# Patient Record
Sex: FEMALE | Race: WHITE | NOT HISPANIC OR LATINO | Employment: OTHER | URBAN - METROPOLITAN AREA
[De-identification: names, ages, dates, MRNs, and addresses within clinical notes are randomized per-mention and may not be internally consistent; named-entity substitution may affect disease eponyms.]

---

## 2017-01-10 ENCOUNTER — HISTORICAL (OUTPATIENT)
Dept: ADMINISTRATIVE | Facility: HOSPITAL | Age: 57
End: 2017-01-10

## 2017-01-19 ENCOUNTER — HISTORICAL (OUTPATIENT)
Dept: RADIOLOGY | Facility: HOSPITAL | Age: 57
End: 2017-01-19

## 2017-03-31 ENCOUNTER — HISTORICAL (OUTPATIENT)
Dept: ADMINISTRATIVE | Facility: HOSPITAL | Age: 57
End: 2017-03-31

## 2017-06-01 LAB — CRC RECOMMENDATION EXT: NORMAL

## 2017-11-10 ENCOUNTER — HISTORICAL (OUTPATIENT)
Dept: ADMINISTRATIVE | Facility: HOSPITAL | Age: 57
End: 2017-11-10

## 2017-11-10 LAB
BUN SERPL-MCNC: 14 MG/DL (ref 7–18)
CALCIUM SERPL-MCNC: 9.2 MG/DL (ref 8.5–10.1)
CHLORIDE SERPL-SCNC: 104 MMOL/L (ref 98–107)
CO2 SERPL-SCNC: 28 MMOL/L (ref 21–32)
CREAT SERPL-MCNC: 0.66 MG/DL (ref 0.55–1.02)
GLUCOSE SERPL-MCNC: 84 MG/DL (ref 74–106)
POTASSIUM SERPL-SCNC: 4.1 MMOL/L (ref 3.5–5.1)
SODIUM SERPL-SCNC: 139 MMOL/L (ref 136–145)

## 2018-01-30 ENCOUNTER — HISTORICAL (OUTPATIENT)
Dept: PREADMISSION TESTING | Facility: HOSPITAL | Age: 58
End: 2018-01-30

## 2018-02-09 ENCOUNTER — HISTORICAL (OUTPATIENT)
Dept: SURGERY | Facility: HOSPITAL | Age: 58
End: 2018-02-09

## 2018-03-01 ENCOUNTER — HISTORICAL (OUTPATIENT)
Dept: LAB | Facility: HOSPITAL | Age: 58
End: 2018-03-01

## 2018-03-03 LAB — FINAL CULTURE: NORMAL

## 2018-03-13 ENCOUNTER — HISTORICAL (OUTPATIENT)
Dept: RADIOLOGY | Facility: HOSPITAL | Age: 58
End: 2018-03-13

## 2018-03-27 ENCOUNTER — HISTORICAL (OUTPATIENT)
Dept: RESPIRATORY THERAPY | Facility: HOSPITAL | Age: 58
End: 2018-03-27

## 2018-04-24 ENCOUNTER — HISTORICAL (OUTPATIENT)
Dept: INTENSIVE CARE | Facility: HOSPITAL | Age: 58
End: 2018-04-24

## 2018-04-26 ENCOUNTER — HISTORICAL (OUTPATIENT)
Dept: ADMINISTRATIVE | Facility: HOSPITAL | Age: 58
End: 2018-04-26

## 2018-04-26 LAB
ABS NEUT (OLG): 4.32 X10(3)/MCL (ref 2.1–9.2)
ALBUMIN SERPL-MCNC: 3.6 GM/DL (ref 3.4–5)
ALBUMIN/GLOB SERPL: 1.3 {RATIO}
ALP SERPL-CCNC: 70 UNIT/L (ref 38–126)
ALT SERPL-CCNC: 32 UNIT/L (ref 12–78)
AST SERPL-CCNC: 26 UNIT/L (ref 15–37)
BASOPHILS # BLD AUTO: 0 X10(3)/MCL (ref 0–0.2)
BASOPHILS NFR BLD AUTO: 1 %
BILIRUB SERPL-MCNC: 0.3 MG/DL (ref 0.2–1)
BILIRUBIN DIRECT+TOT PNL SERPL-MCNC: 0.1 MG/DL (ref 0–0.2)
BILIRUBIN DIRECT+TOT PNL SERPL-MCNC: 0.2 MG/DL (ref 0–0.8)
BUN SERPL-MCNC: 17 MG/DL (ref 7–18)
CALCIUM SERPL-MCNC: 9.3 MG/DL (ref 8.5–10.1)
CHLORIDE SERPL-SCNC: 104 MMOL/L (ref 98–107)
CHOLEST SERPL-MCNC: 172 MG/DL (ref 0–200)
CHOLEST/HDLC SERPL: 2.7 {RATIO} (ref 0–4)
CO2 SERPL-SCNC: 29 MMOL/L (ref 21–32)
CREAT SERPL-MCNC: 0.67 MG/DL (ref 0.55–1.02)
EOSINOPHIL # BLD AUTO: 0.2 X10(3)/MCL (ref 0–0.9)
EOSINOPHIL NFR BLD AUTO: 3 %
ERYTHROCYTE [DISTWIDTH] IN BLOOD BY AUTOMATED COUNT: 13 % (ref 11.5–17)
GLOBULIN SER-MCNC: 2.7 GM/DL (ref 2.4–3.5)
GLUCOSE SERPL-MCNC: 90 MG/DL (ref 74–106)
HCT VFR BLD AUTO: 36.9 % (ref 37–47)
HDLC SERPL-MCNC: 64 MG/DL (ref 35–60)
HGB BLD-MCNC: 11.8 GM/DL (ref 12–16)
LDLC SERPL CALC-MCNC: 97 MG/DL (ref 0–129)
LYMPHOCYTES # BLD AUTO: 2 X10(3)/MCL (ref 0.6–4.6)
LYMPHOCYTES NFR BLD AUTO: 29 %
MCH RBC QN AUTO: 29.9 PG (ref 27–31)
MCHC RBC AUTO-ENTMCNC: 32 GM/DL (ref 33–36)
MCV RBC AUTO: 93.4 FL (ref 80–94)
MONOCYTES # BLD AUTO: 0.4 X10(3)/MCL (ref 0.1–1.3)
MONOCYTES NFR BLD AUTO: 6 %
NEUTROPHILS # BLD AUTO: 4.32 X10(3)/MCL (ref 1.4–7.9)
NEUTROPHILS NFR BLD AUTO: 61 %
PLATELET # BLD AUTO: 282 X10(3)/MCL (ref 130–400)
PMV BLD AUTO: 10.4 FL (ref 9.4–12.4)
POTASSIUM SERPL-SCNC: 3.8 MMOL/L (ref 3.5–5.1)
PROT SERPL-MCNC: 6.3 GM/DL (ref 6.4–8.2)
RBC # BLD AUTO: 3.95 X10(6)/MCL (ref 4.2–5.4)
SODIUM SERPL-SCNC: 141 MMOL/L (ref 136–145)
TRIGL SERPL-MCNC: 55 MG/DL (ref 30–150)
VLDLC SERPL CALC-MCNC: 11 MG/DL
WBC # SPEC AUTO: 7 X10(3)/MCL (ref 4.5–11.5)

## 2018-05-22 ENCOUNTER — HISTORICAL (OUTPATIENT)
Dept: INTENSIVE CARE | Facility: HOSPITAL | Age: 58
End: 2018-05-22

## 2018-06-06 ENCOUNTER — HISTORICAL (OUTPATIENT)
Dept: ADMINISTRATIVE | Facility: HOSPITAL | Age: 58
End: 2018-06-06

## 2018-06-22 ENCOUNTER — HISTORICAL (OUTPATIENT)
Dept: ADMINISTRATIVE | Facility: HOSPITAL | Age: 58
End: 2018-06-22

## 2018-06-22 LAB
ABS NEUT (OLG): 4.49 X10(3)/MCL (ref 2.1–9.2)
ALBUMIN SERPL-MCNC: 3.8 GM/DL (ref 3.4–5)
ALBUMIN/GLOB SERPL: 1.3 RATIO (ref 1.1–2)
ALP SERPL-CCNC: 73 UNIT/L (ref 38–126)
ALT SERPL-CCNC: 33 UNIT/L (ref 12–78)
APPEARANCE, UA: CLEAR
APTT PPP: 29.8 SECOND(S) (ref 24.8–36.9)
AST SERPL-CCNC: 16 UNIT/L (ref 15–37)
BACTERIA SPEC CULT: ABNORMAL /HPF
BASOPHILS # BLD AUTO: 0 X10(3)/MCL (ref 0–0.2)
BASOPHILS NFR BLD AUTO: 1 %
BILIRUB SERPL-MCNC: 0.6 MG/DL (ref 0.2–1)
BILIRUB UR QL STRIP: NEGATIVE
BILIRUBIN DIRECT+TOT PNL SERPL-MCNC: 0.1 MG/DL (ref 0–0.5)
BILIRUBIN DIRECT+TOT PNL SERPL-MCNC: 0.5 MG/DL (ref 0–0.8)
BUN SERPL-MCNC: 17 MG/DL (ref 7–18)
CALCIUM SERPL-MCNC: 9.4 MG/DL (ref 8.5–10.1)
CHLORIDE SERPL-SCNC: 100 MMOL/L (ref 98–107)
CO2 SERPL-SCNC: 31 MMOL/L (ref 21–32)
COLOR UR: YELLOW
CREAT SERPL-MCNC: 0.7 MG/DL (ref 0.55–1.02)
EOSINOPHIL # BLD AUTO: 0.2 X10(3)/MCL (ref 0–0.9)
EOSINOPHIL NFR BLD AUTO: 2 %
ERYTHROCYTE [DISTWIDTH] IN BLOOD BY AUTOMATED COUNT: 13.4 % (ref 11.5–17)
GLOBULIN SER-MCNC: 2.9 GM/DL (ref 2.4–3.5)
GLUCOSE (UA): NEGATIVE
GLUCOSE SERPL-MCNC: 95 MG/DL (ref 74–106)
HCT VFR BLD AUTO: 37.8 % (ref 37–47)
HGB BLD-MCNC: 12.2 GM/DL (ref 12–16)
HGB UR QL STRIP: NEGATIVE
INR PPP: 0.96 (ref 0–1.27)
KETONES UR QL STRIP: NEGATIVE
LEUKOCYTE ESTERASE UR QL STRIP: ABNORMAL
LYMPHOCYTES # BLD AUTO: 1.7 X10(3)/MCL (ref 0.6–4.6)
LYMPHOCYTES NFR BLD AUTO: 25 %
MCH RBC QN AUTO: 29.4 PG (ref 27–31)
MCHC RBC AUTO-ENTMCNC: 32.3 GM/DL (ref 33–36)
MCV RBC AUTO: 91.1 FL (ref 80–94)
MONOCYTES # BLD AUTO: 0.6 X10(3)/MCL (ref 0.1–1.3)
MONOCYTES NFR BLD AUTO: 8 %
NEUTROPHILS # BLD AUTO: 4.49 X10(3)/MCL (ref 1.4–7.9)
NEUTROPHILS NFR BLD AUTO: 64 %
NITRITE UR QL STRIP: NEGATIVE
PH UR STRIP: 7.5 [PH] (ref 5–9)
PLATELET # BLD AUTO: 342 X10(3)/MCL (ref 130–400)
PMV BLD AUTO: 10.3 FL (ref 9.4–12.4)
POTASSIUM SERPL-SCNC: 3.8 MMOL/L (ref 3.5–5.1)
PROT SERPL-MCNC: 6.7 GM/DL (ref 6.4–8.2)
PROT UR QL STRIP: NEGATIVE
PROTHROMBIN TIME: 13.1 SECOND(S) (ref 12.2–14.7)
RBC # BLD AUTO: 4.15 X10(6)/MCL (ref 4.2–5.4)
RBC #/AREA URNS HPF: ABNORMAL /[HPF]
SODIUM SERPL-SCNC: 137 MMOL/L (ref 136–145)
SP GR UR STRIP: 1.02 (ref 1–1.03)
SQUAMOUS EPITHELIAL, UA: ABNORMAL
TRANSFERRIN SERPL-MCNC: 250 MG/DL (ref 200–360)
UROBILINOGEN UR STRIP-ACNC: 1
WBC # SPEC AUTO: 7 X10(3)/MCL (ref 4.5–11.5)
WBC #/AREA URNS HPF: ABNORMAL /[HPF]

## 2018-06-25 LAB — FINAL CULTURE: NORMAL

## 2018-07-30 ENCOUNTER — HISTORICAL (OUTPATIENT)
Dept: ADMINISTRATIVE | Facility: HOSPITAL | Age: 58
End: 2018-07-30

## 2018-10-25 ENCOUNTER — HISTORICAL (OUTPATIENT)
Dept: LAB | Facility: HOSPITAL | Age: 58
End: 2018-10-25

## 2018-10-25 LAB
ABS NEUT (OLG): 4.95 X10(3)/MCL (ref 2.1–9.2)
BASOPHILS # BLD AUTO: 0 X10(3)/MCL (ref 0–0.2)
BASOPHILS NFR BLD AUTO: 1 %
EOSINOPHIL # BLD AUTO: 0.2 X10(3)/MCL (ref 0–0.9)
EOSINOPHIL NFR BLD AUTO: 3 %
ERYTHROCYTE [DISTWIDTH] IN BLOOD BY AUTOMATED COUNT: 15.5 % (ref 11.5–17)
FERRITIN SERPL-MCNC: 58.6 NG/ML (ref 8–388)
HCT VFR BLD AUTO: 38.7 % (ref 37–47)
HGB BLD-MCNC: 11.7 GM/DL (ref 12–16)
IRON SATN MFR SERPL: 25.7 % (ref 20–50)
IRON SERPL-MCNC: 94 MCG/DL (ref 50–175)
LYMPHOCYTES # BLD AUTO: 1.9 X10(3)/MCL (ref 0.6–4.6)
LYMPHOCYTES NFR BLD AUTO: 25 %
MCH RBC QN AUTO: 27.6 PG (ref 27–31)
MCHC RBC AUTO-ENTMCNC: 30.2 GM/DL (ref 33–36)
MCV RBC AUTO: 91.3 FL (ref 80–94)
MONOCYTES # BLD AUTO: 0.5 X10(3)/MCL (ref 0.1–1.3)
MONOCYTES NFR BLD AUTO: 7 %
NEUTROPHILS # BLD AUTO: 4.95 X10(3)/MCL (ref 2.1–9.2)
NEUTROPHILS NFR BLD AUTO: 64 %
PLATELET # BLD AUTO: 378 X10(3)/MCL (ref 130–400)
PMV BLD AUTO: 10 FL (ref 9.4–12.4)
RBC # BLD AUTO: 4.24 X10(6)/MCL (ref 4.2–5.4)
TIBC SERPL-MCNC: 366 MCG/DL (ref 250–450)
TRANSFERRIN SERPL-MCNC: 288 MG/DL (ref 200–360)
WBC # SPEC AUTO: 7.7 X10(3)/MCL (ref 4.5–11.5)

## 2019-01-07 ENCOUNTER — HISTORICAL (OUTPATIENT)
Dept: ADMINISTRATIVE | Facility: HOSPITAL | Age: 59
End: 2019-01-07

## 2019-01-30 ENCOUNTER — HISTORICAL (OUTPATIENT)
Dept: ADMINISTRATIVE | Facility: HOSPITAL | Age: 59
End: 2019-01-30

## 2019-04-10 ENCOUNTER — HISTORICAL (OUTPATIENT)
Dept: ADMINISTRATIVE | Facility: HOSPITAL | Age: 59
End: 2019-04-10

## 2019-04-30 ENCOUNTER — HISTORICAL (OUTPATIENT)
Dept: ADMINISTRATIVE | Facility: HOSPITAL | Age: 59
End: 2019-04-30

## 2019-04-30 LAB
ABS NEUT (OLG): 3.92 X10(3)/MCL (ref 2.1–9.2)
ALBUMIN SERPL-MCNC: 3.7 GM/DL (ref 3.4–5)
ALBUMIN/GLOB SERPL: 1.3 {RATIO}
ALP SERPL-CCNC: 80 UNIT/L (ref 38–126)
ALT SERPL-CCNC: 28 UNIT/L (ref 12–78)
AST SERPL-CCNC: 10 UNIT/L (ref 15–37)
BASOPHILS # BLD AUTO: 0 X10(3)/MCL (ref 0–0.2)
BASOPHILS NFR BLD AUTO: 1 %
BILIRUB SERPL-MCNC: 0.4 MG/DL (ref 0.2–1)
BILIRUBIN DIRECT+TOT PNL SERPL-MCNC: 0.1 MG/DL (ref 0–0.2)
BILIRUBIN DIRECT+TOT PNL SERPL-MCNC: 0.3 MG/DL (ref 0–0.8)
BUN SERPL-MCNC: 18 MG/DL (ref 7–18)
CALCIUM SERPL-MCNC: 9.4 MG/DL (ref 8.5–10.1)
CHLORIDE SERPL-SCNC: 103 MMOL/L (ref 98–107)
CHOLEST SERPL-MCNC: 209 MG/DL (ref 0–200)
CHOLEST/HDLC SERPL: 2.8 {RATIO} (ref 0–4)
CO2 SERPL-SCNC: 28 MMOL/L (ref 21–32)
CREAT SERPL-MCNC: 0.72 MG/DL (ref 0.55–1.02)
EOSINOPHIL # BLD AUTO: 0.2 X10(3)/MCL (ref 0–0.9)
EOSINOPHIL NFR BLD AUTO: 2 %
ERYTHROCYTE [DISTWIDTH] IN BLOOD BY AUTOMATED COUNT: 14.2 % (ref 11.5–17)
GLOBULIN SER-MCNC: 2.9 GM/DL (ref 2.4–3.5)
GLUCOSE SERPL-MCNC: 80 MG/DL (ref 74–106)
HCT VFR BLD AUTO: 36.7 % (ref 37–47)
HDLC SERPL-MCNC: 76 MG/DL (ref 35–60)
HGB BLD-MCNC: 11.6 GM/DL (ref 12–16)
LDLC SERPL CALC-MCNC: 117 MG/DL (ref 0–129)
LYMPHOCYTES # BLD AUTO: 2.1 X10(3)/MCL (ref 0.6–4.6)
LYMPHOCYTES NFR BLD AUTO: 31 %
MCH RBC QN AUTO: 28.9 PG (ref 27–31)
MCHC RBC AUTO-ENTMCNC: 31.6 GM/DL (ref 33–36)
MCV RBC AUTO: 91.5 FL (ref 80–94)
MONOCYTES # BLD AUTO: 0.5 X10(3)/MCL (ref 0.1–1.3)
MONOCYTES NFR BLD AUTO: 7 %
NEUTROPHILS # BLD AUTO: 3.92 X10(3)/MCL (ref 2.1–9.2)
NEUTROPHILS NFR BLD AUTO: 59 %
NRBC BLD AUTO-RTO: 0.3 % (ref 0–0.2)
PLATELET # BLD AUTO: 296 X10(3)/MCL (ref 130–400)
PMV BLD AUTO: 10.7 FL (ref 9.4–12.4)
POTASSIUM SERPL-SCNC: 4.3 MMOL/L (ref 3.5–5.1)
PROT SERPL-MCNC: 6.6 GM/DL (ref 6.4–8.2)
RBC # BLD AUTO: 4.01 X10(6)/MCL (ref 4.2–5.4)
SODIUM SERPL-SCNC: 137 MMOL/L (ref 136–145)
TRIGL SERPL-MCNC: 81 MG/DL (ref 30–150)
TSH SERPL-ACNC: 0.7 MIU/L (ref 0.36–3.74)
VLDLC SERPL CALC-MCNC: 16 MG/DL
WBC # SPEC AUTO: 6.7 X10(3)/MCL (ref 4.5–11.5)

## 2019-09-13 ENCOUNTER — HISTORICAL (OUTPATIENT)
Dept: ADMINISTRATIVE | Facility: HOSPITAL | Age: 59
End: 2019-09-13

## 2019-10-16 ENCOUNTER — HISTORICAL (OUTPATIENT)
Dept: ADMINISTRATIVE | Facility: HOSPITAL | Age: 59
End: 2019-10-16

## 2020-02-24 ENCOUNTER — HISTORICAL (OUTPATIENT)
Dept: RESPIRATORY THERAPY | Facility: HOSPITAL | Age: 60
End: 2020-02-24

## 2020-04-20 ENCOUNTER — HISTORICAL (OUTPATIENT)
Dept: ADMINISTRATIVE | Facility: HOSPITAL | Age: 60
End: 2020-04-20

## 2020-05-15 ENCOUNTER — HISTORICAL (OUTPATIENT)
Dept: ADMINISTRATIVE | Facility: HOSPITAL | Age: 60
End: 2020-05-15

## 2020-06-01 ENCOUNTER — HISTORICAL (OUTPATIENT)
Dept: ADMINISTRATIVE | Facility: HOSPITAL | Age: 60
End: 2020-06-01

## 2020-06-01 LAB
ABS NEUT (OLG): 4.92 X10(3)/MCL (ref 2.1–9.2)
ALBUMIN SERPL-MCNC: 3.9 GM/DL (ref 3.4–4.8)
ALBUMIN/GLOB SERPL: 1.5 RATIO (ref 1.1–2)
ALP SERPL-CCNC: 87 UNIT/L (ref 40–150)
ALT SERPL-CCNC: 19 UNIT/L (ref 0–55)
AST SERPL-CCNC: 15 UNIT/L (ref 5–34)
BASOPHILS # BLD AUTO: 0 X10(3)/MCL (ref 0–0.2)
BASOPHILS NFR BLD AUTO: 0 %
BILIRUB SERPL-MCNC: 0.4 MG/DL
BILIRUBIN DIRECT+TOT PNL SERPL-MCNC: 0.2 MG/DL (ref 0–0.5)
BILIRUBIN DIRECT+TOT PNL SERPL-MCNC: 0.2 MG/DL (ref 0–0.8)
BUN SERPL-MCNC: 11 MG/DL (ref 9.8–20.1)
CALCIUM SERPL-MCNC: 9.7 MG/DL (ref 8.4–10.2)
CHLORIDE SERPL-SCNC: 104 MMOL/L (ref 98–107)
CHOLEST SERPL-MCNC: 175 MG/DL
CHOLEST/HDLC SERPL: 3 {RATIO} (ref 0–5)
CO2 SERPL-SCNC: 27 MMOL/L (ref 23–31)
CREAT SERPL-MCNC: 0.76 MG/DL (ref 0.55–1.02)
EOSINOPHIL # BLD AUTO: 0.2 X10(3)/MCL (ref 0–0.9)
EOSINOPHIL NFR BLD AUTO: 2 %
ERYTHROCYTE [DISTWIDTH] IN BLOOD BY AUTOMATED COUNT: 13.7 % (ref 11.5–17)
GLOBULIN SER-MCNC: 2.6 GM/DL (ref 2.4–3.5)
GLUCOSE SERPL-MCNC: 101 MG/DL (ref 82–115)
HCT VFR BLD AUTO: 38.9 % (ref 37–47)
HDLC SERPL-MCNC: 52 MG/DL (ref 35–60)
HGB BLD-MCNC: 12.2 GM/DL (ref 12–16)
LDLC SERPL CALC-MCNC: 100 MG/DL (ref 50–140)
LYMPHOCYTES # BLD AUTO: 2.2 X10(3)/MCL (ref 0.6–4.6)
LYMPHOCYTES NFR BLD AUTO: 27 %
MCH RBC QN AUTO: 29.3 PG (ref 27–31)
MCHC RBC AUTO-ENTMCNC: 31.4 GM/DL (ref 33–36)
MCV RBC AUTO: 93.3 FL (ref 80–94)
MONOCYTES # BLD AUTO: 0.6 X10(3)/MCL (ref 0.1–1.3)
MONOCYTES NFR BLD AUTO: 8 %
NEUTROPHILS # BLD AUTO: 4.92 X10(3)/MCL (ref 2.1–9.2)
NEUTROPHILS NFR BLD AUTO: 62 %
PLATELET # BLD AUTO: 307 X10(3)/MCL (ref 130–400)
PMV BLD AUTO: 9.8 FL (ref 9.4–12.4)
POTASSIUM SERPL-SCNC: 4.6 MMOL/L (ref 3.5–5.1)
PROT SERPL-MCNC: 6.5 GM/DL (ref 5.8–7.6)
RBC # BLD AUTO: 4.17 X10(6)/MCL (ref 4.2–5.4)
SODIUM SERPL-SCNC: 139 MMOL/L (ref 136–145)
TRIGL SERPL-MCNC: 116 MG/DL (ref 37–140)
TSH SERPL-ACNC: 0.83 UIU/ML (ref 0.35–4.94)
VLDLC SERPL CALC-MCNC: 23 MG/DL
WBC # SPEC AUTO: 7.9 X10(3)/MCL (ref 4.5–11.5)

## 2020-09-30 ENCOUNTER — HISTORICAL (OUTPATIENT)
Dept: ADMINISTRATIVE | Facility: HOSPITAL | Age: 60
End: 2020-09-30

## 2020-09-30 LAB
ABS NEUT (OLG): 5.45 X10(3)/MCL (ref 2.1–9.2)
BASOPHILS # BLD AUTO: 0 X10(3)/MCL (ref 0–0.2)
BASOPHILS NFR BLD AUTO: 1 %
EOSINOPHIL # BLD AUTO: 0.2 X10(3)/MCL (ref 0–0.9)
EOSINOPHIL NFR BLD AUTO: 2 %
ERYTHROCYTE [DISTWIDTH] IN BLOOD BY AUTOMATED COUNT: 13.8 % (ref 11.5–17)
HCT VFR BLD AUTO: 40.2 % (ref 37–47)
HGB BLD-MCNC: 12.7 GM/DL (ref 12–16)
LYMPHOCYTES # BLD AUTO: 1.9 X10(3)/MCL (ref 0.6–4.6)
LYMPHOCYTES NFR BLD AUTO: 23 %
MCH RBC QN AUTO: 29.1 PG (ref 27–31)
MCHC RBC AUTO-ENTMCNC: 31.6 GM/DL (ref 33–36)
MCV RBC AUTO: 92 FL (ref 80–94)
MONOCYTES # BLD AUTO: 0.7 X10(3)/MCL (ref 0.1–1.3)
MONOCYTES NFR BLD AUTO: 8 %
NEUTROPHILS # BLD AUTO: 5.45 X10(3)/MCL (ref 2.1–9.2)
NEUTROPHILS NFR BLD AUTO: 66 %
PLATELET # BLD AUTO: 330 X10(3)/MCL (ref 130–400)
PMV BLD AUTO: 10.3 FL (ref 9.4–12.4)
RBC # BLD AUTO: 4.37 X10(6)/MCL (ref 4.2–5.4)
WBC # SPEC AUTO: 8.3 X10(3)/MCL (ref 4.5–11.5)

## 2020-10-19 ENCOUNTER — HISTORICAL (OUTPATIENT)
Dept: ADMINISTRATIVE | Facility: HOSPITAL | Age: 60
End: 2020-10-19

## 2021-10-07 ENCOUNTER — HISTORICAL (OUTPATIENT)
Dept: ADMINISTRATIVE | Facility: HOSPITAL | Age: 61
End: 2021-10-07

## 2021-10-07 LAB
ABS NEUT (OLG): 4.71 X10(3)/MCL (ref 2.1–9.2)
ALBUMIN SERPL-MCNC: 3.8 GM/DL (ref 3.4–4.8)
ALBUMIN/GLOB SERPL: 1.3 RATIO (ref 1.1–2)
ALP SERPL-CCNC: 87 UNIT/L (ref 40–150)
ALT SERPL-CCNC: 24 UNIT/L (ref 0–55)
AST SERPL-CCNC: 19 UNIT/L (ref 5–34)
BASOPHILS # BLD AUTO: 0 X10(3)/MCL (ref 0–0.2)
BASOPHILS NFR BLD AUTO: 0 %
BILIRUB SERPL-MCNC: 0.6 MG/DL
BILIRUBIN DIRECT+TOT PNL SERPL-MCNC: 0.3 MG/DL (ref 0–0.5)
BILIRUBIN DIRECT+TOT PNL SERPL-MCNC: 0.3 MG/DL (ref 0–0.8)
BUN SERPL-MCNC: 12.7 MG/DL (ref 9.8–20.1)
CALCIUM SERPL-MCNC: 10.2 MG/DL (ref 8.4–10.2)
CHLORIDE SERPL-SCNC: 103 MMOL/L (ref 98–107)
CHOLEST SERPL-MCNC: 149 MG/DL
CHOLEST/HDLC SERPL: 3 {RATIO} (ref 0–5)
CO2 SERPL-SCNC: 26 MMOL/L (ref 23–31)
CREAT SERPL-MCNC: 0.77 MG/DL (ref 0.55–1.02)
ERYTHROCYTE [DISTWIDTH] IN BLOOD BY AUTOMATED COUNT: 13.7 % (ref 11.5–17)
FERRITIN SERPL-MCNC: 162.72 NG/ML (ref 4.63–204)
GLOBULIN SER-MCNC: 3 GM/DL (ref 2.4–3.5)
GLUCOSE SERPL-MCNC: 93 MG/DL (ref 82–115)
HCT VFR BLD AUTO: 39.6 % (ref 37–47)
HDLC SERPL-MCNC: 58 MG/DL (ref 35–60)
HGB BLD-MCNC: 12.3 GM/DL (ref 12–16)
IRON SATN MFR SERPL: 22 % (ref 20–50)
IRON SERPL-MCNC: 72 UG/DL (ref 50–170)
LDLC SERPL CALC-MCNC: 79 MG/DL (ref 50–140)
LYMPHOCYTES # BLD AUTO: 1.7 X10(3)/MCL (ref 0.6–4.6)
LYMPHOCYTES NFR BLD AUTO: 25 %
MCH RBC QN AUTO: 29.1 PG (ref 27–31)
MCHC RBC AUTO-ENTMCNC: 31.1 GM/DL (ref 33–36)
MCV RBC AUTO: 93.6 FL (ref 80–94)
MONOCYTES # BLD AUTO: 0.6 X10(3)/MCL (ref 0.1–1.3)
MONOCYTES NFR BLD AUTO: 8 %
NEUTROPHILS # BLD AUTO: 4.71 X10(3)/MCL (ref 2.1–9.2)
NEUTROPHILS NFR BLD AUTO: 67 %
PLATELET # BLD AUTO: 349 X10(3)/MCL (ref 130–400)
PMV BLD AUTO: 10.3 FL (ref 9.4–12.4)
POTASSIUM SERPL-SCNC: 4.4 MMOL/L (ref 3.5–5.1)
PROT SERPL-MCNC: 6.8 GM/DL (ref 5.8–7.6)
RBC # BLD AUTO: 4.23 X10(6)/MCL (ref 4.2–5.4)
SODIUM SERPL-SCNC: 139 MMOL/L (ref 136–145)
TIBC SERPL-MCNC: 254 UG/DL (ref 70–310)
TIBC SERPL-MCNC: 326 UG/DL (ref 250–450)
TRANSFERRIN SERPL-MCNC: 287 MG/DL (ref 173–360)
TRIGL SERPL-MCNC: 62 MG/DL (ref 37–140)
TSH SERPL-ACNC: 1.01 UIU/ML (ref 0.35–4.94)
VLDLC SERPL CALC-MCNC: 12 MG/DL
WBC # SPEC AUTO: 7 X10(3)/MCL (ref 4.5–11.5)

## 2022-02-19 LAB
PAP RECOMMENDATION EXT: NORMAL
PAP SMEAR: NORMAL

## 2022-04-11 ENCOUNTER — HISTORICAL (OUTPATIENT)
Dept: ADMINISTRATIVE | Facility: HOSPITAL | Age: 62
End: 2022-04-11

## 2022-04-25 VITALS
BODY MASS INDEX: 37.73 KG/M2 | DIASTOLIC BLOOD PRESSURE: 68 MMHG | HEIGHT: 62 IN | OXYGEN SATURATION: 95 % | SYSTOLIC BLOOD PRESSURE: 130 MMHG | WEIGHT: 205 LBS

## 2022-04-30 NOTE — OP NOTE
Patient:   Ivette Santillan            MRN: [id number]            FIN: [id number]               Age:   57 years     Sex:  Female     :  1960   Associated Diagnoses:   Unspecified lump in unspecified  breast; Unspecified lump in unspecified  breast   Author:   Geremias Castillo MD      Operative Note   Operative Information   Date/ Time:  2018 16:52:00.     Procedures Performed: Wire Localized Excisional Breast Biopsy, left breast.     Preoperative Diagnosis: Unspecified lump in unspecified  breast ([id number] N63.0).     Postoperative Diagnosis: Unspecified lump in unspecified  breast ([id number] N63.0).     Surgeon: Geremias Castillo MD.     Assistant: Kendal Luong.     Anesthesia: General.     Speciman Removed: Breast Tissue.     Description of Procedure/Findings/    Complications: Wire previously placed by radiologist.  Wire trimmed after patient prepped and draped.  Incision performed carried down to encompass tissue surrounding wire.  Specimen confirmed by specimen radiograph.  wound checked for hemostasis, wound closed with vicryl and prolene.     Esimated blood loss: loss less than  15  cc.     Complications: None.

## 2022-07-19 LAB — CRC RECOMMENDATION EXT: NORMAL

## 2022-08-26 PROBLEM — J45.901 ASTHMA EXACERBATION: Status: ACTIVE | Noted: 2022-08-26

## 2022-10-25 ENCOUNTER — LAB VISIT (OUTPATIENT)
Dept: LAB | Facility: HOSPITAL | Age: 62
End: 2022-10-25
Attending: FAMILY MEDICINE
Payer: COMMERCIAL

## 2022-10-25 DIAGNOSIS — E07.9 DISEASE OF THYROID GLAND: ICD-10-CM

## 2022-10-25 DIAGNOSIS — E78.5 HYPERLIPIDEMIA, UNSPECIFIED HYPERLIPIDEMIA TYPE: ICD-10-CM

## 2022-10-25 DIAGNOSIS — Z00.00 ROUTINE GENERAL MEDICAL EXAMINATION AT A HEALTH CARE FACILITY: Primary | ICD-10-CM

## 2022-10-25 LAB
ALBUMIN SERPL-MCNC: 3.9 GM/DL (ref 3.4–4.8)
ALBUMIN/GLOB SERPL: 1.6 RATIO (ref 1.1–2)
ALP SERPL-CCNC: 102 UNIT/L (ref 40–150)
ALT SERPL-CCNC: 21 UNIT/L (ref 0–55)
AST SERPL-CCNC: 16 UNIT/L (ref 5–34)
BASOPHILS # BLD AUTO: 0.03 X10(3)/MCL (ref 0–0.2)
BASOPHILS NFR BLD AUTO: 0.4 %
BILIRUBIN DIRECT+TOT PNL SERPL-MCNC: 0.5 MG/DL
BUN SERPL-MCNC: 15 MG/DL (ref 9.8–20.1)
CALCIUM SERPL-MCNC: 9.9 MG/DL (ref 8.4–10.2)
CHLORIDE SERPL-SCNC: 105 MMOL/L (ref 98–107)
CHOLEST SERPL-MCNC: 154 MG/DL
CHOLEST/HDLC SERPL: 3 {RATIO} (ref 0–5)
CO2 SERPL-SCNC: 23 MMOL/L (ref 23–31)
CREAT SERPL-MCNC: 0.77 MG/DL (ref 0.55–1.02)
EOSINOPHIL # BLD AUTO: 0 X10(3)/MCL (ref 0–0.9)
EOSINOPHIL NFR BLD AUTO: 0 %
ERYTHROCYTE [DISTWIDTH] IN BLOOD BY AUTOMATED COUNT: 13.8 % (ref 11.5–17)
GFR SERPLBLD CREATININE-BSD FMLA CKD-EPI: >60 MLS/MIN/1.73/M2
GLOBULIN SER-MCNC: 2.4 GM/DL (ref 2.4–3.5)
GLUCOSE SERPL-MCNC: 94 MG/DL (ref 82–115)
HCT VFR BLD AUTO: 39.3 % (ref 37–47)
HDLC SERPL-MCNC: 54 MG/DL (ref 35–60)
HGB BLD-MCNC: 12.4 GM/DL (ref 12–16)
IMM GRANULOCYTES # BLD AUTO: 0.02 X10(3)/MCL (ref 0–0.04)
IMM GRANULOCYTES NFR BLD AUTO: 0.3 %
LDLC SERPL CALC-MCNC: 86 MG/DL (ref 50–140)
LYMPHOCYTES # BLD AUTO: 1.96 X10(3)/MCL (ref 0.6–4.6)
LYMPHOCYTES NFR BLD AUTO: 28.1 %
MCH RBC QN AUTO: 29.4 PG (ref 27–31)
MCHC RBC AUTO-ENTMCNC: 31.6 MG/DL (ref 33–36)
MCV RBC AUTO: 93.1 FL (ref 80–94)
MONOCYTES # BLD AUTO: 0.45 X10(3)/MCL (ref 0.1–1.3)
MONOCYTES NFR BLD AUTO: 6.4 %
NEUTROPHILS # BLD AUTO: 4.5 X10(3)/MCL (ref 2.1–9.2)
NEUTROPHILS NFR BLD AUTO: 64.8 %
NRBC BLD AUTO-RTO: 0 %
PLATELET # BLD AUTO: 313 X10(3)/MCL (ref 130–400)
PMV BLD AUTO: 10.3 FL (ref 7.4–10.4)
POTASSIUM SERPL-SCNC: 4.5 MMOL/L (ref 3.5–5.1)
PROT SERPL-MCNC: 6.3 GM/DL (ref 5.8–7.6)
RBC # BLD AUTO: 4.22 X10(6)/MCL (ref 4.2–5.4)
SODIUM SERPL-SCNC: 137 MMOL/L (ref 136–145)
TRIGL SERPL-MCNC: 68 MG/DL (ref 37–140)
TSH SERPL-ACNC: 1.66 UIU/ML (ref 0.35–4.94)
VLDLC SERPL CALC-MCNC: 14 MG/DL
WBC # SPEC AUTO: 7 X10(3)/MCL (ref 4.5–11.5)

## 2022-10-25 PROCEDURE — 85025 COMPLETE CBC W/AUTO DIFF WBC: CPT

## 2022-10-25 PROCEDURE — 84443 ASSAY THYROID STIM HORMONE: CPT

## 2022-10-25 PROCEDURE — 36415 COLL VENOUS BLD VENIPUNCTURE: CPT

## 2022-10-25 PROCEDURE — 80061 LIPID PANEL: CPT

## 2022-10-25 PROCEDURE — 80053 COMPREHEN METABOLIC PANEL: CPT

## 2022-11-10 ENCOUNTER — OFFICE VISIT (OUTPATIENT)
Dept: PRIMARY CARE CLINIC | Facility: CLINIC | Age: 62
End: 2022-11-10
Payer: COMMERCIAL

## 2022-11-10 VITALS
WEIGHT: 209.19 LBS | SYSTOLIC BLOOD PRESSURE: 109 MMHG | OXYGEN SATURATION: 99 % | HEART RATE: 57 BPM | BODY MASS INDEX: 38.26 KG/M2 | DIASTOLIC BLOOD PRESSURE: 67 MMHG

## 2022-11-10 DIAGNOSIS — I10 HYPERTENSION, UNSPECIFIED TYPE: ICD-10-CM

## 2022-11-10 DIAGNOSIS — G47.33 OSA ON CPAP: ICD-10-CM

## 2022-11-10 DIAGNOSIS — E66.01 MORBID (SEVERE) OBESITY DUE TO EXCESS CALORIES: Primary | ICD-10-CM

## 2022-11-10 DIAGNOSIS — I25.10 CORONARY ARTERY DISEASE, UNSPECIFIED VESSEL OR LESION TYPE, UNSPECIFIED WHETHER ANGINA PRESENT, UNSPECIFIED WHETHER NATIVE OR TRANSPLANTED HEART: ICD-10-CM

## 2022-11-10 DIAGNOSIS — E78.5 HYPERLIPIDEMIA, UNSPECIFIED HYPERLIPIDEMIA TYPE: ICD-10-CM

## 2022-11-10 DIAGNOSIS — J41.1 MUCOPURULENT CHRONIC BRONCHITIS: ICD-10-CM

## 2022-11-10 DIAGNOSIS — Z00.00 WELLNESS EXAMINATION: ICD-10-CM

## 2022-11-10 DIAGNOSIS — M25.811 SHOULDER IMPINGEMENT, RIGHT: ICD-10-CM

## 2022-11-10 DIAGNOSIS — J82.83 EOSINOPHILIC ASTHMA: ICD-10-CM

## 2022-11-10 PROCEDURE — 1160F RVW MEDS BY RX/DR IN RCRD: CPT | Mod: CPTII,,, | Performed by: FAMILY MEDICINE

## 2022-11-10 PROCEDURE — 3008F BODY MASS INDEX DOCD: CPT | Mod: CPTII,,, | Performed by: FAMILY MEDICINE

## 2022-11-10 PROCEDURE — 99396 PR PREVENTIVE VISIT,EST,40-64: ICD-10-PCS | Mod: ,,, | Performed by: FAMILY MEDICINE

## 2022-11-10 PROCEDURE — 1160F PR REVIEW ALL MEDS BY PRESCRIBER/CLIN PHARMACIST DOCUMENTED: ICD-10-PCS | Mod: CPTII,,, | Performed by: FAMILY MEDICINE

## 2022-11-10 PROCEDURE — 3074F PR MOST RECENT SYSTOLIC BLOOD PRESSURE < 130 MM HG: ICD-10-PCS | Mod: CPTII,,, | Performed by: FAMILY MEDICINE

## 2022-11-10 PROCEDURE — 3008F PR BODY MASS INDEX (BMI) DOCUMENTED: ICD-10-PCS | Mod: CPTII,,, | Performed by: FAMILY MEDICINE

## 2022-11-10 PROCEDURE — 3074F SYST BP LT 130 MM HG: CPT | Mod: CPTII,,, | Performed by: FAMILY MEDICINE

## 2022-11-10 PROCEDURE — 3078F PR MOST RECENT DIASTOLIC BLOOD PRESSURE < 80 MM HG: ICD-10-PCS | Mod: CPTII,,, | Performed by: FAMILY MEDICINE

## 2022-11-10 PROCEDURE — 99396 PREV VISIT EST AGE 40-64: CPT | Mod: ,,, | Performed by: FAMILY MEDICINE

## 2022-11-10 PROCEDURE — 1159F MED LIST DOCD IN RCRD: CPT | Mod: CPTII,,, | Performed by: FAMILY MEDICINE

## 2022-11-10 PROCEDURE — 3078F DIAST BP <80 MM HG: CPT | Mod: CPTII,,, | Performed by: FAMILY MEDICINE

## 2022-11-10 PROCEDURE — 1159F PR MEDICATION LIST DOCUMENTED IN MEDICAL RECORD: ICD-10-PCS | Mod: CPTII,,, | Performed by: FAMILY MEDICINE

## 2022-11-10 NOTE — PROGRESS NOTES
Chief Complaint  Chief Complaint   Patient presents with    Annual Exam       History of Present Illness  Patient presents to clinic for routine follow-up visit of chronic medical conditions and routine wellness visit.  Blood work performed prior to this visit includes CBC and CMP with no overly concerning findings with FLP showing LDL of 86 with TSH within normal limits.  Blood pressure is within normal range in clinic at this time and she reports compliance to current medication regimen as noted below.  Only acute complaint today which is more of a chronic complaint at this point is 6 months of right shoulder pain without known trauma or injury with no radiating pain to the hand or fingertips or weakness or numbness or tingling reported.  Over-the-counter medications attempted for treatment.    PMH:  ----------------------------  Allergic rhinitis due to pollen  Anxiety disorder, unspecified  Depression  GERD (gastroesophageal reflux disease)  HTN (hypertension)  Mild intermittent asthma, uncomplicated  Obesity, unspecified  Sleep apnea, unspecified     Procedure/Surgical History  Past Surgical History:   Procedure Laterality Date    APPENDECTOMY      BREAST LUMPECTOMY      COLONOSCOPY  06/01/2017    KNEE ARTHROPLASTY         Medications  Current Outpatient Medications on File Prior to Visit   Medication Sig Dispense Refill    albuterol (PROVENTIL/VENTOLIN HFA) 90 mcg/actuation inhaler       azelastine (ASTELIN) 137 mcg (0.1 %) nasal spray USE 2 SPRAYS IN BOTH NOSTRILS TWICE DAILY 30 mL 7    benralizumab (FASENRA) 30 mg/mL Syrg injection   See Instructions, 30 mg Subcutaneous EVERY 8 WEEKS (shipped to ), # 1 EA, 6 Refill(s), Pharmacy: [facility], 157, cm, Height/Length Dosing, 07/06/21 13:05:00 CDT, 94.34, kg, Weight Dosing, 07/06/21 13:05:00 CDT      dicyclomine (BENTYL) 10 MG capsule TAKE ONE TABLET EVERY 6 HOURS. AS NEEDED FOR CRAMPS      EScitalopram oxalate (LEXAPRO) 10 MG tablet Take 10  mg by mouth once daily.      ezetimibe (ZETIA) 10 mg tablet Take 10 mg by mouth once daily.      famotidine (PEPCID) 20 MG tablet Take 20 mg by mouth 2 (two) times daily.      fluticasone furoate-vilanteroL (BREO) 200-25 mcg/dose DsDv diskus inhaler Inhale 1 puff into the lungs once daily. 1 each 11    fluticasone propionate (FLONASE) 50 mcg/actuation nasal spray 1 spray by Each Nostril route once daily.      losartan-hydrochlorothiazide 100-25 mg (HYZAAR) 100-25 mg per tablet Take 1 tablet by mouth once daily.      metoprolol tartrate (LOPRESSOR) 25 MG tablet Take 25 mg by mouth 2 (two) times daily.      montelukast (SINGULAIR) 10 mg tablet Take 10 mg by mouth every evening.      omeprazole (PRILOSEC) 20 MG capsule Take 40 mg by mouth once daily.      pravastatin (PRAVACHOL) 40 MG tablet Take 40 mg by mouth every evening.      SPIRIVA RESPIMAT 1.25 mcg/actuation inhaler INHALE 2 PUFFS DAILY 10 g 3    spironolactone (ALDACTONE) 50 MG tablet Take 50 mg by mouth once daily.      predniSONE (DELTASONE) 20 MG tablet Take 2 tabs po daily for 3 days, then decrease 1 tabs po daily for 3 days, then decrease to 1/2 tab po daily for 3 days then stop 13 tablet 0     No current facility-administered medications on file prior to visit.       Specialist:  Patient Care Team:  Colin Haq MD as PCP - General (Family Medicine)     Screening:  Health Maintenance Due   Topic Date Due    Hepatitis C Screening  Never done    HIV Screening  Never done    TETANUS VACCINE  Never done    Shingles Vaccine (1 of 2) Never done    COVID-19 Vaccine (4 - Booster for Moderna series) 01/14/2022    Influenza Vaccine (1) 09/01/2022        Allegies  Review of patient's allergies indicates:   Allergen Reactions    Amoxicillin-pot clavulanate      Other reaction(s): rash    Clindamycin      Other reaction(s): nausea, vomiting    Ibuprofen      Other reaction(s): rash    Metoclopramide      Other reaction(s): anxiety        Social History  Social  History     Socioeconomic History    Marital status:    Tobacco Use    Smoking status: Never    Smokeless tobacco: Never   Substance and Sexual Activity    Alcohol use: Never    Drug use: Never    Sexual activity: Yes       Family History  Family History   Problem Relation Age of Onset    Heart failure Mother     Stroke Mother     Cancer Father     Heart failure Sister     Heart failure Brother     Cancer Brother        Review of Systems  Review of Systems   Constitutional:  Negative for chills and fever.   HENT:  Negative for congestion and sore throat.    Eyes:  Negative for redness and visual disturbance.   Respiratory:  Negative for cough and shortness of breath.    Cardiovascular:  Negative for chest pain and palpitations.   Gastrointestinal:  Negative for nausea and vomiting.   Musculoskeletal:  Negative for arthralgias and myalgias.   Skin:  Negative for pallor and rash.   Neurological:  Negative for dizziness and headaches.   Psychiatric/Behavioral:  Negative for agitation and dysphoric mood.      Physical Exam  Physical Exam  Constitutional:       Appearance: Normal appearance. She is obese.   HENT:      Head: Normocephalic and atraumatic.   Eyes:      General: No scleral icterus.     Conjunctiva/sclera: Conjunctivae normal.   Cardiovascular:      Rate and Rhythm: Normal rate and regular rhythm.   Pulmonary:      Effort: Pulmonary effort is normal.      Breath sounds: Normal breath sounds.   Musculoskeletal:      Comments: Neg empty can test with neg neers and hawks and pos painful arch with + proximal biceps tendon tenderness and no swelling.    Skin:     General: Skin is warm and dry.   Neurological:      General: No focal deficit present.      Mental Status: She is alert and oriented to person, place, and time.   Psychiatric:         Mood and Affect: Mood normal.         Behavior: Behavior normal.       Immunizations  Immunization History   Administered Date(s) Administered    COVID-19, MRNA,  LN-S, PF (MODERNA FULL 0.5 ML DOSE) 01/12/2021, 02/09/2021    COVID-19, MRNA, LN-S, PF (Pfizer) (Purple Cap) 11/19/2021    Influenza - Trivalent - PF (ADULT) 10/30/2019    Pneumococcal Conjugate - 13 Valent 02/11/2016    Pneumococcal Polysaccharide - 23 Valent 03/03/2017       Health Maintenance  Health Maintenance   Topic Date Due    Hepatitis C Screening  Never done    TETANUS VACCINE  Never done    Mammogram  05/10/2023    High Dose Statin  11/10/2023    Lipid Panel  10/25/2027        Assessment     ICD-10-CM ICD-9-CM   1. Morbid (severe) obesity due to excess calories  E66.01 278.01   2. Mucopurulent chronic bronchitis  J41.1 491.1   3. Wellness examination  Z00.00 V70.0   4. Coronary artery disease, unspecified vessel or lesion type, unspecified whether angina present, unspecified whether native or transplanted heart  I25.10 414.00   5. Hypertension, unspecified type  I10 401.9   6. Eosinophilic asthma  J82.83 518.3   7. Hyperlipidemia, unspecified hyperlipidemia type  E78.5 272.4   8. SHARIF on CPAP  G47.33 327.23    Z99.89 V46.8   9. Shoulder impingement, right  M75.41 726.2        Plan  Problem List Items Addressed This Visit          Pulmonary    Mucopurulent chronic bronchitis    Eosinophilic asthma    Overview     Chronic cough, albuterol nebulizer treatments with rescue inhaler and Singulair with Spiriva and fluticasone            Cardiac/Vascular    Coronary artery disease    Overview     CT calcium score of heart greater than 1500 05/30/2019, seen by Cardiology referred to as soft plaque, beta-blocker, statin, Zetia, baby aspirin, continue follow-up with specialist for routine monitoring and management         Hypertension    Overview     Blood pressure goal <140/90 (<130/80 if otherwise noted), recommend DASH diet, record BP at home daily and bring log to next office visit to assure that home cuff is calibrated at minimum every 12 months, continue current medication regimen.         Hyperlipidemia        Endocrine    Morbid (severe) obesity due to excess calories - Primary       Orthopedic    Shoulder impingement, right    Overview     Range-of-motion exercises recommended with cool compresses to affected area with adequate hydration and follow-up with physical therapy persistent symptoms are noted            Other    Wellness examination    Overview     Discussed routine annual health maintenance and screening in addition to acute issues noted below.         SHARIF on CPAP       Follow up in about 1 year (around 11/11/2023) for wellness, lab review.    Colin Haq

## 2022-11-29 DIAGNOSIS — E78.5 HYPERLIPIDEMIA, UNSPECIFIED HYPERLIPIDEMIA TYPE: Primary | ICD-10-CM

## 2022-11-29 RX ORDER — SPIRONOLACTONE 50 MG/1
50 TABLET, FILM COATED ORAL DAILY
Qty: 30 TABLET | Refills: 5 | Status: SHIPPED | OUTPATIENT
Start: 2022-11-29 | End: 2023-05-12 | Stop reason: SDUPTHER

## 2023-02-01 ENCOUNTER — DOCUMENTATION ONLY (OUTPATIENT)
Dept: ADMINISTRATIVE | Facility: HOSPITAL | Age: 63
End: 2023-02-01
Payer: COMMERCIAL

## 2023-02-06 ENCOUNTER — TELEPHONE (OUTPATIENT)
Dept: PRIMARY CARE CLINIC | Facility: CLINIC | Age: 63
End: 2023-02-06
Payer: COMMERCIAL

## 2023-02-06 DIAGNOSIS — F41.9 ANXIETY: Primary | ICD-10-CM

## 2023-02-06 RX ORDER — ESCITALOPRAM OXALATE 10 MG/1
10 TABLET ORAL DAILY
Qty: 30 TABLET | Refills: 3 | Status: SHIPPED | OUTPATIENT
Start: 2023-02-06 | End: 2023-02-28

## 2023-02-10 ENCOUNTER — TELEPHONE (OUTPATIENT)
Dept: PRIMARY CARE CLINIC | Facility: CLINIC | Age: 63
End: 2023-02-10
Payer: COMMERCIAL

## 2023-02-10 DIAGNOSIS — R12 HEARTBURN: ICD-10-CM

## 2023-02-10 DIAGNOSIS — T78.40XD ALLERGY, SUBSEQUENT ENCOUNTER: Primary | ICD-10-CM

## 2023-02-10 RX ORDER — FLUTICASONE PROPIONATE 50 MCG
1 SPRAY, SUSPENSION (ML) NASAL DAILY
Qty: 50 ML | Refills: 3 | Status: SHIPPED | OUTPATIENT
Start: 2023-02-10

## 2023-02-10 RX ORDER — FAMOTIDINE 20 MG/1
20 TABLET, FILM COATED ORAL 2 TIMES DAILY
Qty: 30 TABLET | Refills: 5 | Status: SHIPPED | OUTPATIENT
Start: 2023-02-10 | End: 2023-02-28

## 2023-02-10 NOTE — TELEPHONE ENCOUNTER
----- Message from Theresa Mejia sent at 2/10/2023 10:50 AM CST -----  Regarding: med refill  .Type:  RX Refill Request    Who Called: pt  Refill or New Rx:refill  RX Name and Strength:fluticasone propionate (FLONASE) 50 mcg/actuation nasal spray  How is the patient currently taking it? (ex. 1XDay):  Is this a 30 day or 90 day RX:  Preferred Pharmacy with phone number:[id number] [place] [address] [facility]  Phone: [phone]  Local or Mail Order:local  Ordering Provider:Severino  Would the patient rather a call back or a response via [employer]? Call back  Best Call Back Number:[phone]  Additional Information: pt needs authorization from  to refill prescription      .Type:  RX Refill Request    Who Called: pt  Refill or New Rx:refill  RX Name and Strength:famotidine (PEPCID) 20 MG tablet  How is the patient currently taking it? (ex. 1XDay):2xday  Is this a 30 day or 90 day RX:  Preferred Pharmacy with phone number:[id number] - [place] - [email or web address]   Phone: [phone]  Local or Mail Order:local  Ordering Provider:Severino  Would the patient rather a call back or a response via [facility]? Call back  Best Call Back Number:[phone]  Additional Information: pt needs authorization from  to refill prescription

## 2023-02-13 PROBLEM — Z00.00 WELLNESS EXAMINATION: Status: RESOLVED | Noted: 2022-11-10 | Resolved: 2023-02-13

## 2023-02-15 ENCOUNTER — PROCEDURE VISIT (OUTPATIENT)
Dept: RESPIRATORY THERAPY | Facility: HOSPITAL | Age: 63
End: 2023-02-15
Attending: AUDIOLOGIST
Payer: COMMERCIAL

## 2023-02-15 DIAGNOSIS — J82.83 EOSINOPHILIC ASTHMA: Primary | ICD-10-CM

## 2023-02-15 PROCEDURE — 94729 DIFFUSING CAPACITY: CPT

## 2023-02-15 PROCEDURE — 94010 BREATHING CAPACITY TEST: CPT

## 2023-02-15 PROCEDURE — 94727 GAS DIL/WSHOT DETER LNG VOL: CPT

## 2023-02-22 RX ORDER — AZELASTINE 1 MG/ML
SPRAY, METERED NASAL
Qty: 30 ML | Refills: 7 | Status: SHIPPED | OUTPATIENT
Start: 2023-02-22

## 2023-02-22 NOTE — TELEPHONE ENCOUNTER
----- Message from Theresa Mejia sent at 2/22/2023  8:12 AM CST -----  .Type:  RX Refill Request    Who Called: pt  Refill or New Rx:refill  RX Name and Strength:valacyclovir 1g  How is the patient currently taking it? (ex. 1XDay):  Is this a 30 day or 90 day RX:  [facility] phone number[id number] [place] [facility] [address] [facility]  Phone: [phone]  Local or Mail Order:local  Ordering Provider:Severino  Would the patient rather a call back or a response via [employer]? Call back  Best Call Back Number:[phone]  Additional Information: pt needs authorization from dr to refill prescription   not in med list      .Type:  RX Refill Request    Who Called: pt  Refill or New Rx:refill  RX Name and Strength:azelastine (ASTELIN) 137 mcg (0.1 %) nasal spray  How is the patient currently taking it? (ex. 1XDay):  Is this a 30 day or 90 day RX:  [facility] phone number[id number] [place] [facility] [address] [email or web address]   Phone: [phone]  Local or Mail Order:local  Ordering Provider:Severino  Would the patient rather a call back or a response via [facility]? Call back  Best Call Back Number:[phone]  Additional Information: pt needs authorization from  to refill prescription

## 2023-02-23 RX ORDER — VALACYCLOVIR HYDROCHLORIDE 1 G/1
2000 TABLET, FILM COATED ORAL 2 TIMES DAILY
Qty: 8 TABLET | Refills: 0 | Status: SHIPPED | OUTPATIENT
Start: 2023-02-23 | End: 2023-08-15 | Stop reason: ALTCHOICE

## 2023-02-23 NOTE — TELEPHONE ENCOUNTER
----- Message from Simone Nash sent at 2/23/2023 10:24 AM CST -----  .Type:  Needs Medical Advice    Who Called: Ivette  Symptoms (please be specific):    How long has patient had these symptoms:    Pharmacy name and phone #:    Would the patient rather a call back or a response via [facility]?   Best Call Back Number: [phone]  Additional Information: She needed to speak with nurse about medication she needs she had called in yesterday.

## 2023-02-23 NOTE — TELEPHONE ENCOUNTER
Patient has a severe burn on lip from sun, and also has fever blisters surrounding her mouth, can you please call in some valacyclovir

## 2023-02-28 ENCOUNTER — PATIENT MESSAGE (OUTPATIENT)
Dept: NEUROLOGY | Facility: CLINIC | Age: 63
End: 2023-02-28
Payer: COMMERCIAL

## 2023-03-07 ENCOUNTER — OFFICE VISIT (OUTPATIENT)
Dept: NEUROLOGY | Facility: CLINIC | Age: 63
End: 2023-03-07
Payer: COMMERCIAL

## 2023-03-07 VITALS
SYSTOLIC BLOOD PRESSURE: 124 MMHG | BODY MASS INDEX: 38.28 KG/M2 | HEIGHT: 62 IN | DIASTOLIC BLOOD PRESSURE: 82 MMHG | WEIGHT: 208 LBS

## 2023-03-07 DIAGNOSIS — G47.33 OSA ON CPAP: Primary | ICD-10-CM

## 2023-03-07 PROCEDURE — 1160F RVW MEDS BY RX/DR IN RCRD: CPT | Mod: CPTII,S$GLB,, | Performed by: NURSE PRACTITIONER

## 2023-03-07 PROCEDURE — 3074F PR MOST RECENT SYSTOLIC BLOOD PRESSURE < 130 MM HG: ICD-10-PCS | Mod: CPTII,S$GLB,, | Performed by: NURSE PRACTITIONER

## 2023-03-07 PROCEDURE — 3008F PR BODY MASS INDEX (BMI) DOCUMENTED: ICD-10-PCS | Mod: CPTII,S$GLB,, | Performed by: NURSE PRACTITIONER

## 2023-03-07 PROCEDURE — 99204 OFFICE O/P NEW MOD 45 MIN: CPT | Mod: S$GLB,,, | Performed by: NURSE PRACTITIONER

## 2023-03-07 PROCEDURE — 3079F DIAST BP 80-89 MM HG: CPT | Mod: CPTII,S$GLB,, | Performed by: NURSE PRACTITIONER

## 2023-03-07 PROCEDURE — 99999 PR PBB SHADOW E&M-EST. PATIENT-LVL IV: ICD-10-PCS | Mod: PBBFAC,,, | Performed by: NURSE PRACTITIONER

## 2023-03-07 PROCEDURE — 99999 PR PBB SHADOW E&M-EST. PATIENT-LVL IV: CPT | Mod: PBBFAC,,, | Performed by: NURSE PRACTITIONER

## 2023-03-07 PROCEDURE — 3074F SYST BP LT 130 MM HG: CPT | Mod: CPTII,S$GLB,, | Performed by: NURSE PRACTITIONER

## 2023-03-07 PROCEDURE — 1159F MED LIST DOCD IN RCRD: CPT | Mod: CPTII,S$GLB,, | Performed by: NURSE PRACTITIONER

## 2023-03-07 PROCEDURE — 1159F PR MEDICATION LIST DOCUMENTED IN MEDICAL RECORD: ICD-10-PCS | Mod: CPTII,S$GLB,, | Performed by: NURSE PRACTITIONER

## 2023-03-07 PROCEDURE — 99204 PR OFFICE/OUTPT VISIT, NEW, LEVL IV, 45-59 MIN: ICD-10-PCS | Mod: S$GLB,,, | Performed by: NURSE PRACTITIONER

## 2023-03-07 PROCEDURE — 3079F PR MOST RECENT DIASTOLIC BLOOD PRESSURE 80-89 MM HG: ICD-10-PCS | Mod: CPTII,S$GLB,, | Performed by: NURSE PRACTITIONER

## 2023-03-07 PROCEDURE — 1160F PR REVIEW ALL MEDS BY PRESCRIBER/CLIN PHARMACIST DOCUMENTED: ICD-10-PCS | Mod: CPTII,S$GLB,, | Performed by: NURSE PRACTITIONER

## 2023-03-07 PROCEDURE — 3008F BODY MASS INDEX DOCD: CPT | Mod: CPTII,S$GLB,, | Performed by: NURSE PRACTITIONER

## 2023-03-07 RX ORDER — EPINEPHRINE 0.3 MG/.3ML
INJECTION, SOLUTION INTRAMUSCULAR
COMMUNITY
Start: 2023-02-09

## 2023-03-07 RX ORDER — ACETAMINOPHEN 500 MG
5000 TABLET ORAL DAILY
COMMUNITY
End: 2023-08-15

## 2023-03-07 RX ORDER — IBUPROFEN 100 MG/5ML
1000 SUSPENSION, ORAL (FINAL DOSE FORM) ORAL
COMMUNITY

## 2023-03-07 NOTE — PROGRESS NOTES
Neurology Note - Initial Encounter    Subjective:         Patient ID: Ivette Santillan is a 62 y.o. female.    Chief Complaint: New pt to re-establish for fadi    HPI:            Here for re-establish for fadi  .  Pt reports no diff w sleep onset or freq awakenings.   .  Denies snoring, witnessed apnea, feeling unable to move when awakening/falling asleep, crawling/aching in legs, or naps.   .  SS done here 04/2018   AHI 52  kay 78%    PAP data:  date range 02/04/2023-03/05/2023  days used >=4hr  30/30  100%  AHI 2.8  Autopap 8-15 cm      EPWORTH SLEEPINESS SCALE 3/7/2023   Sitting and reading 0   Watching TV 0   Sitting, inactive in a public place (e.g. a theatre or a meeting) 0   As a passenger in a car for an hour without a break 0   Lying down to rest in the afternoon when circumstances permit 2   Sitting and talking to someone 0   Sitting quietly after a lunch without alcohol 0   In a car, while stopped for a few minutes in traffic 0   Total score 2       ROS: as per HPI, otherwise pertinent systems review is negative          Past Medical History:   Diagnosis Date    Allergic rhinitis due to pollen     Anxiety disorder, unspecified     Depression     GERD (gastroesophageal reflux disease)     HTN (hypertension)     Mild intermittent asthma, uncomplicated     Obesity, unspecified     Personal history of colonic polyps     Sleep apnea, unspecified        Past Surgical History:   Procedure Laterality Date    APPENDECTOMY      BREAST LUMPECTOMY      COLONOSCOPY  06/01/2017    KNEE ARTHROPLASTY         Family History   Problem Relation Age of Onset    Heart failure Mother     Stroke Mother     Cancer Father     Heart failure Sister     Heart failure Brother     Cancer Brother        Social History     Socioeconomic History    Marital status:    Tobacco Use    Smoking status: Never    Smokeless tobacco: Never   Substance and Sexual Activity    Alcohol use: Never    Drug use: Never    Sexual activity: Yes        Review of patient's allergies indicates:   Allergen Reactions    Amoxicillin-pot clavulanate      Other reaction(s): rash    Clindamycin      Other reaction(s): nausea, vomiting    Ibuprofen      Other reaction(s): rash    Metoclopramide      Other reaction(s): anxiety         Current Outpatient Medications:     albuterol (PROVENTIL/VENTOLIN HFA) 90 mcg/actuation inhaler, , Disp: , Rfl:     ascorbic acid, vitamin C, (VITAMIN C) 1000 MG tablet, Take 1,000 mg by mouth., Disp: , Rfl:     aspirin (ECOTRIN) 81 MG EC tablet, Take 81 mg by mouth once daily., Disp: , Rfl:     AUVI-Q 0.3 mg/0.3 mL AtIn, Inject into the muscle., Disp: , Rfl:     azelastine (ASTELIN) 137 mcg (0.1 %) nasal spray, USE 2 SPRAYS IN BOTH NOSTRILS TWICE DAILY, Disp: 30 mL, Rfl: 7    benralizumab (FASENRA) 30 mg/mL Syrg injection,  See Instructions, 30 mg Subcutaneous EVERY 8 WEEKS (shipped to pt), # 1 EA, 6 Refill(s), Pharmacy: [facility], 157, cm, Height/Length Dosing, 07/06/21 13:05:00 CDT, 94.34, kg, Weight Dosing, 07/06/21 13:05:00 CDT, Disp: , Rfl:     BREO ELLIPTA 200-25 mcg/dose DsDv diskus inhaler, INHALE 1 PUFF INTO THE LUNGS ONCE DAILY., Disp: 60 each, Rfl: 11    cholecalciferol, vitamin D3, (VITAMIN D3) 125 mcg (5,000 unit) Tab, Take 5,000 Units by mouth once daily., Disp: , Rfl:     dicyclomine (BENTYL) 10 MG capsule, TAKE ONE TABLET EVERY 6 HOURS. AS NEEDED FOR CRAMPS, Disp: , Rfl:     EScitalopram oxalate (LEXAPRO) 10 MG tablet, TAKE 1 TABLET BY MOUTH EVERY DAY, Disp: 90 tablet, Rfl: 1    ezetimibe (ZETIA) 10 mg tablet, Take 10 mg by mouth once daily., Disp: , Rfl:     famotidine (PEPCID) 20 MG tablet, TAKE 1 TABLET BY MOUTH TWICE A DAY, Disp: 180 tablet, Rfl: 3    fexofenadine (ALLEGRA) 180 MG tablet, Take 180 mg by mouth once daily., Disp: , Rfl:     fluticasone propionate (FLONASE) 50 mcg/actuation nasal spray, 1 spray (50 mcg total) by Each Nostril route once daily., Disp: 50 mL, Rfl: 3     "losartan-hydrochlorothiazide 100-25 mg (HYZAAR) 100-25 mg per tablet, Take 1 tablet by mouth once daily., Disp: , Rfl:     metoprolol tartrate (LOPRESSOR) 25 MG tablet, Take 25 mg by mouth 2 (two) times daily., Disp: , Rfl:     montelukast (SINGULAIR) 10 mg tablet, Take 1 tablet (10 mg total) by mouth every evening., Disp: 30 tablet, Rfl: 11    multivitamin capsule, Take 1 capsule by mouth once daily., Disp: , Rfl:     omeprazole (PRILOSEC) 20 MG capsule, Take 40 mg by mouth once daily., Disp: , Rfl:     pravastatin (PRAVACHOL) 40 MG tablet, Take 40 mg by mouth every evening., Disp: , Rfl:     SPIRIVA RESPIMAT 1.25 mcg/actuation inhaler, INHALE 2 PUFFS DAILY, Disp: 10 g, Rfl: 3    spironolactone (ALDACTONE) 50 MG tablet, Take 1 tablet (50 mg total) by mouth once daily., Disp: 30 tablet, Rfl: 5    valACYclovir (VALTREX) 1000 MG tablet, Take 2 tablets (2,000 mg total) by mouth 2 (two) times daily. Take for 2 doses, if symptoms persist may repeat for 2 additional doses, Disp: 8 tablet, Rfl: 0     Objective:      Exam:   /82 (BP Location: Left arm, Patient Position: Sitting)   Ht 5' 2" (1.575 m)   Wt 94.3 kg (208 lb)   BMI 38.04 kg/m²     Physical Exam  Vitals reviewed.   Constitutional:       Appearance: Normal appearance. Overweight      Accompanied by:  HENT:      Ears:      Comments: Hearing normal.     Mallampati: 4     Neck Circumference: 17.5"  Eyes:      Extraocular Movements: Extraocular movements intact.   Cardiovascular:      Rate and Rhythm: Normal rate and regular rhythm.   Pulmonary:      Effort: Pulmonary effort is normal.      Breath sounds: Normal breath sounds.   Musculoskeletal:         General: Normal range of motion.   Skin:     General: Skin is warm and dry.   Neurological:      General: No focal deficit present.      Mental Status: she is alert and oriented to person, place, and time.      Speech: nml     Face: symmetric; tongue midline     Motor: nonlateralizing     Gait unassisted and " normal.  Psychiatric:         Mood and Affect: Mood normal.         Behavior: Behavior normal.         Assessment/Plan:     Problem List Items Addressed This Visit          Other    SHARIF on CPAP - Primary    Patient is benefiting from PAP therapy; Encouraged continued use of PAP. Drowsy driving may still occur despite PAP use. Clinical follow up and replacement of supplies discussed.    [place] [place]    FU in 1 year            Alfred Alves, MSN, APRN, AGACNP-BC

## 2023-05-12 DIAGNOSIS — E78.5 HYPERLIPIDEMIA, UNSPECIFIED HYPERLIPIDEMIA TYPE: ICD-10-CM

## 2023-05-12 DIAGNOSIS — R12 HEARTBURN: ICD-10-CM

## 2023-05-12 RX ORDER — FAMOTIDINE 20 MG/1
20 TABLET, FILM COATED ORAL 2 TIMES DAILY
Qty: 180 TABLET | Refills: 3 | Status: SHIPPED | OUTPATIENT
Start: 2023-05-12 | End: 2023-05-12 | Stop reason: SDUPTHER

## 2023-05-12 RX ORDER — SPIRONOLACTONE 50 MG/1
50 TABLET, FILM COATED ORAL DAILY
Qty: 30 TABLET | Refills: 5 | Status: SHIPPED | OUTPATIENT
Start: 2023-05-12 | End: 2023-05-12 | Stop reason: SDUPTHER

## 2023-05-12 RX ORDER — FAMOTIDINE 20 MG/1
20 TABLET, FILM COATED ORAL 2 TIMES DAILY
Qty: 180 TABLET | Refills: 3 | Status: SHIPPED | OUTPATIENT
Start: 2023-05-12

## 2023-05-12 RX ORDER — SPIRONOLACTONE 50 MG/1
50 TABLET, FILM COATED ORAL DAILY
Qty: 30 TABLET | Refills: 5 | Status: SHIPPED | OUTPATIENT
Start: 2023-05-12

## 2023-08-15 PROBLEM — J45.909 ASTHMA: Status: ACTIVE | Noted: 2022-11-10

## 2023-09-07 ENCOUNTER — PATIENT MESSAGE (OUTPATIENT)
Dept: RESEARCH | Facility: HOSPITAL | Age: 63
End: 2023-09-07
Payer: MEDICARE

## 2023-09-21 DIAGNOSIS — J45.50 SEVERE PERSISTENT ASTHMA, UNSPECIFIED WHETHER COMPLICATED: ICD-10-CM

## 2023-09-25 ENCOUNTER — INFUSION (OUTPATIENT)
Dept: INFUSION THERAPY | Facility: HOSPITAL | Age: 63
End: 2023-09-25
Attending: INTERNAL MEDICINE
Payer: MEDICARE

## 2023-09-25 VITALS
HEART RATE: 86 BPM | TEMPERATURE: 98 F | SYSTOLIC BLOOD PRESSURE: 147 MMHG | DIASTOLIC BLOOD PRESSURE: 73 MMHG | RESPIRATION RATE: 18 BRPM

## 2023-09-25 DIAGNOSIS — J45.50 SEVERE PERSISTENT ASTHMA, UNSPECIFIED WHETHER COMPLICATED: Primary | ICD-10-CM

## 2023-09-25 PROCEDURE — 63600175 PHARM REV CODE 636 W HCPCS: Mod: JZ,JG | Performed by: INTERNAL MEDICINE

## 2023-09-25 PROCEDURE — 96372 THER/PROPH/DIAG INJ SC/IM: CPT

## 2023-09-25 RX ADMIN — BENRALIZUMAB 30 MG: 30 INJECTION, SOLUTION SUBCUTANEOUS at 08:09

## 2023-10-27 ENCOUNTER — LAB VISIT (OUTPATIENT)
Dept: LAB | Facility: HOSPITAL | Age: 63
End: 2023-10-27
Attending: STUDENT IN AN ORGANIZED HEALTH CARE EDUCATION/TRAINING PROGRAM
Payer: MEDICARE

## 2023-10-27 DIAGNOSIS — E78.5 HYPERLIPIDEMIA, UNSPECIFIED HYPERLIPIDEMIA TYPE: ICD-10-CM

## 2023-10-27 DIAGNOSIS — Z00.00 WELLNESS EXAMINATION: ICD-10-CM

## 2023-10-27 DIAGNOSIS — I10 HYPERTENSION, UNSPECIFIED TYPE: ICD-10-CM

## 2023-10-27 LAB
ALBUMIN SERPL-MCNC: 3.7 G/DL (ref 3.4–4.8)
ALBUMIN/GLOB SERPL: 1.4 RATIO (ref 1.1–2)
ALP SERPL-CCNC: 98 UNIT/L (ref 40–150)
ALT SERPL-CCNC: 28 UNIT/L (ref 0–55)
AST SERPL-CCNC: 17 UNIT/L (ref 5–34)
BASOPHILS # BLD AUTO: 0.03 X10(3)/MCL
BASOPHILS NFR BLD AUTO: 0.3 %
BILIRUB SERPL-MCNC: 0.4 MG/DL
BUN SERPL-MCNC: 16.3 MG/DL (ref 9.8–20.1)
CALCIUM SERPL-MCNC: 9.9 MG/DL (ref 8.4–10.2)
CHLORIDE SERPL-SCNC: 103 MMOL/L (ref 98–107)
CHOLEST SERPL-MCNC: 166 MG/DL
CHOLEST/HDLC SERPL: 3 {RATIO} (ref 0–5)
CO2 SERPL-SCNC: 30 MMOL/L (ref 23–31)
CREAT SERPL-MCNC: 0.74 MG/DL (ref 0.55–1.02)
EOSINOPHIL # BLD AUTO: 0 X10(3)/MCL (ref 0–0.9)
EOSINOPHIL NFR BLD AUTO: 0 %
ERYTHROCYTE [DISTWIDTH] IN BLOOD BY AUTOMATED COUNT: 13.8 % (ref 11.5–17)
GFR SERPLBLD CREATININE-BSD FMLA CKD-EPI: >60 MLS/MIN/1.73/M2
GLOBULIN SER-MCNC: 2.6 GM/DL (ref 2.4–3.5)
GLUCOSE SERPL-MCNC: 88 MG/DL (ref 82–115)
HCT VFR BLD AUTO: 37.8 % (ref 37–47)
HDLC SERPL-MCNC: 62 MG/DL (ref 35–60)
HGB BLD-MCNC: 11.9 G/DL (ref 12–16)
IMM GRANULOCYTES # BLD AUTO: 0.03 X10(3)/MCL (ref 0–0.04)
IMM GRANULOCYTES NFR BLD AUTO: 0.3 %
LDLC SERPL CALC-MCNC: 88 MG/DL (ref 50–140)
LYMPHOCYTES # BLD AUTO: 2.91 X10(3)/MCL (ref 0.6–4.6)
LYMPHOCYTES NFR BLD AUTO: 31.9 %
MCH RBC QN AUTO: 29 PG (ref 27–31)
MCHC RBC AUTO-ENTMCNC: 31.5 G/DL (ref 33–36)
MCV RBC AUTO: 92 FL (ref 80–94)
MONOCYTES # BLD AUTO: 0.6 X10(3)/MCL (ref 0.1–1.3)
MONOCYTES NFR BLD AUTO: 6.6 %
NEUTROPHILS # BLD AUTO: 5.54 X10(3)/MCL (ref 2.1–9.2)
NEUTROPHILS NFR BLD AUTO: 60.9 %
NRBC BLD AUTO-RTO: 0 %
PLATELET # BLD AUTO: 310 X10(3)/MCL (ref 130–400)
PMV BLD AUTO: 10.2 FL (ref 7.4–10.4)
POTASSIUM SERPL-SCNC: 4 MMOL/L (ref 3.5–5.1)
PROT SERPL-MCNC: 6.3 GM/DL (ref 5.8–7.6)
RBC # BLD AUTO: 4.11 X10(6)/MCL (ref 4.2–5.4)
SODIUM SERPL-SCNC: 140 MMOL/L (ref 136–145)
TRIGL SERPL-MCNC: 82 MG/DL (ref 37–140)
TSH SERPL-ACNC: 0.61 UIU/ML (ref 0.35–4.94)
VLDLC SERPL CALC-MCNC: 16 MG/DL
WBC # SPEC AUTO: 9.11 X10(3)/MCL (ref 4.5–11.5)

## 2023-10-27 PROCEDURE — 80061 LIPID PANEL: CPT

## 2023-10-27 PROCEDURE — 80053 COMPREHEN METABOLIC PANEL: CPT

## 2023-10-27 PROCEDURE — 85025 COMPLETE CBC W/AUTO DIFF WBC: CPT

## 2023-10-27 PROCEDURE — 36415 COLL VENOUS BLD VENIPUNCTURE: CPT

## 2023-10-27 PROCEDURE — 84443 ASSAY THYROID STIM HORMONE: CPT

## 2023-11-01 PROBLEM — J41.1 MUCOPURULENT CHRONIC BRONCHITIS: Status: RESOLVED | Noted: 2022-11-10 | Resolved: 2023-11-01

## 2023-11-01 NOTE — PROGRESS NOTES
Date: 11/13/2023  Patient ID: [id number]   Chief Complaint: Follow-up (With labs)    HPI:   Ivette Santillan is a 63 y.o. female here today for Follow-up (With labs)  Recent labs wnl.   Recentlly treated with prednisone and Z-neli for asthma exacerbation. Has h/o gastritis, which improved with bentyl, and she would like refill. Already taking Pepcid and Omeprazole. Recently cardiology stopped Metoprolol and started Diltiazem. BP lately intermittently for 6 wks 130-140s/80s.     Past Medical History:   Diagnosis Date    Allergic rhinitis due to pollen     Anxiety disorder, unspecified     Depression     GERD (gastroesophageal reflux disease)     HTN (hypertension)     Mild intermittent asthma, uncomplicated     Obesity, unspecified     Personal history of colonic polyps     Sleep apnea, unspecified      Outpatient Medications Marked as Taking for the 11/13/23 encounter (Office Visit) with Tanya Lagunas MD   Medication Sig Dispense Refill    albuterol (PROVENTIL) 2.5 mg /3 mL (0.083 %) nebulizer solution Take 3 mLs (2.5 mg total) by nebulization every 6 (six) hours as needed for Wheezing or Shortness of Breath. Rescue 30 each 5    albuterol (PROVENTIL/VENTOLIN HFA) 90 mcg/actuation inhaler Inhale 2 puffs into the lungs every 4 (four) hours as needed for Wheezing. 18 g 11    ascorbic acid, vitamin C, (VITAMIN C) 1000 MG tablet Take 1,000 mg by mouth.      aspirin (ECOTRIN) 81 MG EC tablet Take 81 mg by mouth once daily.      AUVI-Q 0.3 mg/0.3 mL AtIn Inject into the muscle.      azelastine (ASTELIN) 137 mcg (0.1 %) nasal spray USE 2 SPRAYS IN BOTH NOSTRILS TWICE DAILY 30 mL 7    benralizumab (FASENRA) 30 mg/mL Syrg injection Inject 30 mg into the skin.      diltiaZEM (CARDIZEM CD) 120 MG Cp24 Take 120 mg by mouth.      EScitalopram oxalate (LEXAPRO) 10 MG tablet TAKE 1 TABLET BY MOUTH EVERY DAY 90 tablet 1    ezetimibe (ZETIA) 10 mg tablet Take 10 mg by mouth once daily.      famotidine (PEPCID) 20 MG tablet Take 1 tablet  (20 mg total) by mouth 2 (two) times daily. (Patient taking differently: Take 20 mg by mouth 2 (two) times daily. 2 tablets at night) 180 tablet 3    fexofenadine (ALLEGRA) 180 MG tablet Take 180 mg by mouth once daily.      fluticasone propionate (FLONASE) 50 mcg/actuation nasal spray 1 spray (50 mcg total) by Each Nostril route once daily. 50 mL 3    fluticasone-umeclidin-vilanter (TRELEGY ELLIPTA) 200-62.5-25 mcg inhaler Inhale 1 puff into the lungs once daily. 1 each 11    losartan-hydrochlorothiazide 100-25 mg (HYZAAR) 100-25 mg per tablet Take 1 tablet by mouth once daily.      montelukast (SINGULAIR) 10 mg tablet Take 1 tablet (10 mg total) by mouth every evening. 30 tablet 11    multivitamin capsule Take 1 capsule by mouth once daily.      omeprazole (PRILOSEC) 20 MG capsule Take 40 mg by mouth once daily.      pravastatin (PRAVACHOL) 40 MG tablet Take 40 mg by mouth every evening.      spironolactone (ALDACTONE) 50 MG tablet Take 1 tablet (50 mg total) by mouth once daily. 30 tablet 5    [DISCONTINUED] dicyclomine (BENTYL) 10 MG capsule TAKE ONE TABLET EVERY 6 HOURS. AS NEEDED FOR CRAMPS       Past Surgical History:   Procedure Laterality Date    APPENDECTOMY      BREAST LUMPECTOMY      COLONOSCOPY  06/01/2017    KNEE ARTHROPLASTY       Review of patient's allergies indicates:   Allergen Reactions    Amoxicillin-pot clavulanate      Other reaction(s): rash    Clindamycin      Other reaction(s): nausea, vomiting    Ibuprofen      Other reaction(s): rash    Metoclopramide      Other reaction(s): anxiety     Family History   Problem Relation Age of Onset    Heart failure Mother     Stroke Mother     Cancer Father     Heart failure Sister     Heart failure Brother     Cancer Brother       Social History     Socioeconomic History    Marital status:    Tobacco Use    Smoking status: Never    Smokeless tobacco: Never   Substance and Sexual Activity    Alcohol use: Never    Drug use: Never    Sexual  "activity: Yes     Social Determinants of Health     Financial Resource Strain: Low Risk  (11/13/2023)    Overall Financial Resource Strain (CARDIA)     Difficulty of Paying Living Expenses: Not hard at all   Food Insecurity: No Food Insecurity (11/13/2023)    Hunger Vital Sign     Worried About Running Out of Food in the Last Year: Never true     Ran Out of Food in the Last Year: Never true   Transportation Needs: No Transportation Needs (11/13/2023)    PRAPARE - Transportation     Lack of Transportation (Medical): No     Lack of Transportation (Non-Medical): No   Physical Activity: Inactive (11/13/2023)    Exercise Vital Sign     Days of Exercise per Week: 0 days     Minutes of Exercise per Session: 0 min   Stress: No Stress Concern Present (11/13/2023)    [employer] - Occupational Stress Questionnaire     Feeling of Stress : Not at all   Social Connections: Unknown (11/13/2023)    Social Connection and Isolation Panel [NHANES]     Frequency of Communication with Friends and Family: More than three times a week     Frequency of Social Gatherings with Friends and Family: Twice a week     Attends [employer] Services: Patient refused     Active Member of Clubs or Organizations: Patient refused     Attends Club or Organization Meetings: Patient refused     Marital Status:    Housing Stability: Unknown (11/13/2023)    Housing Stability Vital Sign     Unable to Pay for Housing in the Last Year: No     Unstable Housing in the Last Year: No     Patient Care Team:  Tanya Lagunas MD as PCP - General (Family Medicine)  Johnathon Gamez MD as Consulting Physician (Obstetrics and Gynecology)  Jose F Peralta MD as Consulting Physician (Gastroenterology)   Subjective:   ROS  See HPI for details  All Other ROS: Negative except as stated in HPI.   Objective:   /66   Pulse 79   Temp 97.6 °F (36.4 °C)   Ht 5' 2" (1.575 m)   Wt 96.9 kg (213 lb 9.6 oz)   SpO2 96%   BMI " 39.07 kg/m²   Physical Exam  Constitutional:       Appearance: She is obese.   Cardiovascular:      Rate and Rhythm: Normal rate and regular rhythm.      Heart sounds: Normal heart sounds.   Pulmonary:      Effort: Pulmonary effort is normal.      Breath sounds: No wheezing, rhonchi or rales.   Neurological:      Mental Status: She is oriented to person, place, and time.   Psychiatric:         Mood and Affect: Mood normal.         Behavior: Behavior normal.         Thought Content: Thought content normal.         Judgment: Judgment normal.       Assessment:       ICD-10-CM ICD-9-CM   1. Morbid (severe) obesity due to excess calories  E66.01 278.01   2. Primary hypertension  I10 401.9   3. Gastritis, presence of bleeding unspecified, unspecified chronicity, unspecified gastritis type  K29.70 535.50      Plan:   1. Morbid (severe) obesity due to excess calories  Overview:  Body mass index is 39.07 kg/m².  Recommend intensive, multicomponent, behavioral interventions:  Goal BMI <30.  Goal is to exercise at least 5 times a week for 30 minutes per day.   -Stand more each day.   -Increase exercise: Start with 10 minutes daily and build up to 60-90 minutes/day with moderate activity  Reduce caloric intake:  -Women: 8853-8617 kcal/day  Avoid soda, simple sugars, excessive rice, potatoes or bread. Limit fast foods and fried foods.  Choose complex carbs in moderation (example: green vegetables, beans, oatmeal). Eat plenty of fresh fruits and vegetables with lean meats daily.  Do not skip meals. Eat a balanced portion size.  Avoid fad diets. Consider long-term healthy life style changes.         2. Primary hypertension  Overview:  Blood pressure goal <140/90 (<130/80 if otherwise noted), recommend DASH diet, record BP at home daily and bring log to next office visit to assure that home cuff is calibrated at minimum every 12 months, continue current medication regimen. If BP elevated at home, consider increasing  Diltiazem      3. Gastritis, presence of bleeding unspecified, unspecified chronicity, unspecified gastritis type  Overview:  Stable with Bentyl prn  Continue current tx    Orders:  -     dicyclomine (BENTYL) 10 MG capsule; TAKE ONE TABLET EVERY 6 HOURS. AS NEEDED FOR CRAMPS  Dispense: 60 capsule; Refill: 6         Medication List with Changes/Refills   Current Medications    ALBUTEROL (PROVENTIL) 2.5 MG /3 ML (0.083 %) NEBULIZER SOLUTION    Take 3 mLs (2.5 mg total) by nebulization every 6 (six) hours as needed for Wheezing or Shortness of Breath. Rescue       Start Date: 10/24/2023End Date: 10/23/2024    ALBUTEROL (PROVENTIL/VENTOLIN HFA) 90 MCG/ACTUATION INHALER    Inhale 2 puffs into the lungs every 4 (four) hours as needed for Wheezing.       Start Date: 4/24/2023 End Date: --    ASCORBIC ACID, VITAMIN C, (VITAMIN C) 1000 MG TABLET    Take 1,000 mg by mouth.       Start Date: --        End Date: --    ASPIRIN (ECOTRIN) 81 MG EC TABLET    Take 81 mg by mouth once daily.       Start Date: --        End Date: --    AUVI-Q 0.3 MG/0.3 ML ATIN    Inject into the muscle.       Start Date: 2/9/2023  End Date: --    AZELASTINE (ASTELIN) 137 MCG (0.1 %) NASAL SPRAY    USE 2 SPRAYS IN BOTH NOSTRILS TWICE DAILY       Start Date: 2/22/2023 End Date: --    BENRALIZUMAB (FASENRA) 30 MG/ML SYRG INJECTION    Inject 30 mg into the skin.       Start Date: --        End Date: --    DILTIAZEM (CARDIZEM CD) 120 MG CP24    Take 120 mg by mouth.       Start Date: 4/6/2023  End Date: --    ESCITALOPRAM OXALATE (LEXAPRO) 10 MG TABLET    TAKE 1 TABLET BY MOUTH EVERY DAY       Start Date: 2/28/2023 End Date: --    EZETIMIBE (ZETIA) 10 MG TABLET    Take 10 mg by mouth once daily.       Start Date: 5/10/2022 End Date: --    FAMOTIDINE (PEPCID) 20 MG TABLET    Take 1 tablet (20 mg total) by mouth 2 (two) times daily.       Start Date: 5/12/2023 End Date: --    FASENRA PEN 30 MG/ML ATIN    INJECT 1 PEN UNDER THE SKIN EVERY 8 WEEKS.        Start Date: 5/4/2023  End Date: --    FEXOFENADINE (ALLEGRA) 180 MG TABLET    Take 180 mg by mouth once daily.       Start Date: --        End Date: --    FLUTICASONE PROPIONATE (FLONASE) 50 MCG/ACTUATION NASAL SPRAY    1 spray (50 mcg total) by Each Nostril route once daily.       Start Date: 2/10/2023 End Date: --    FLUTICASONE-UMECLIDIN-VILANTER (TRELEGY ELLIPTA) 200-62.5-25 MCG INHALER    Inhale 1 puff into the lungs once daily.       Start Date: 9/21/2023 End Date: --    LOSARTAN-HYDROCHLOROTHIAZIDE 100-25 MG (HYZAAR) 100-25 MG PER TABLET    Take 1 tablet by mouth once daily.       Start Date: 5/30/2022 End Date: --    MONTELUKAST (SINGULAIR) 10 MG TABLET    Take 1 tablet (10 mg total) by mouth every evening.       Start Date: 10/11/2023End Date: --    MULTIVITAMIN CAPSULE    Take 1 capsule by mouth once daily.       Start Date: --        End Date: --    OMEPRAZOLE (PRILOSEC) 20 MG CAPSULE    Take 40 mg by mouth once daily.       Start Date: 5/3/2022  End Date: --    PRAVASTATIN (PRAVACHOL) 40 MG TABLET    Take 40 mg by mouth every evening.       Start Date: 5/10/2022 End Date: --    PREDNISONE (DELTASONE) 10 MG TABLET    Take 4 tabs po daily for 3 days then decrease to 2 tabs po daily for 3 days then decrease 1 tab daily for 3 days then stop       Start Date: 10/24/2023End Date: --    PREDNISONE (DELTASONE) 20 MG TABLET    Take 1 tablet (20 mg total) by mouth once daily.       Start Date: 10/31/2023End Date: --    SPIRONOLACTONE (ALDACTONE) 50 MG TABLET    Take 1 tablet (50 mg total) by mouth once daily.       Start Date: 5/12/2023 End Date: --   Changed and/or Refilled Medications    Modified Medication Previous Medication    DICYCLOMINE (BENTYL) 10 MG CAPSULE dicyclomine (BENTYL) 10 MG capsule       TAKE ONE TABLET EVERY 6 HOURS. AS NEEDED FOR CRAMPS    TAKE ONE TABLET EVERY 6 HOURS. AS NEEDED FOR CRAMPS       Start Date: 11/13/2023End Date: --    Start Date: 5/4/2022  End Date: 11/13/2023        Follow up  in about 6 months (around 5/13/2024) for Medicare Wellness. In addition to their scheduled follow up, the patient has also been instructed to follow up on as needed basis.

## 2023-11-13 ENCOUNTER — OFFICE VISIT (OUTPATIENT)
Dept: PRIMARY CARE CLINIC | Facility: CLINIC | Age: 63
End: 2023-11-13
Payer: MEDICARE

## 2023-11-13 VITALS
HEIGHT: 62 IN | WEIGHT: 213.63 LBS | SYSTOLIC BLOOD PRESSURE: 115 MMHG | HEART RATE: 79 BPM | DIASTOLIC BLOOD PRESSURE: 66 MMHG | BODY MASS INDEX: 39.31 KG/M2 | TEMPERATURE: 98 F | OXYGEN SATURATION: 96 %

## 2023-11-13 DIAGNOSIS — E66.01 MORBID (SEVERE) OBESITY DUE TO EXCESS CALORIES: Primary | ICD-10-CM

## 2023-11-13 DIAGNOSIS — I10 PRIMARY HYPERTENSION: ICD-10-CM

## 2023-11-13 DIAGNOSIS — K29.70 GASTRITIS, PRESENCE OF BLEEDING UNSPECIFIED, UNSPECIFIED CHRONICITY, UNSPECIFIED GASTRITIS TYPE: ICD-10-CM

## 2023-11-13 PROCEDURE — 99214 PR OFFICE/OUTPT VISIT, EST, LEVL IV, 30-39 MIN: ICD-10-PCS | Mod: ,,, | Performed by: STUDENT IN AN ORGANIZED HEALTH CARE EDUCATION/TRAINING PROGRAM

## 2023-11-13 PROCEDURE — 99214 OFFICE O/P EST MOD 30 MIN: CPT | Mod: ,,, | Performed by: STUDENT IN AN ORGANIZED HEALTH CARE EDUCATION/TRAINING PROGRAM

## 2023-11-13 RX ORDER — DICYCLOMINE HYDROCHLORIDE 10 MG/1
CAPSULE ORAL
Qty: 60 CAPSULE | Refills: 6 | Status: SHIPPED | OUTPATIENT
Start: 2023-11-13

## 2023-11-20 ENCOUNTER — INFUSION (OUTPATIENT)
Dept: INFUSION THERAPY | Facility: HOSPITAL | Age: 63
End: 2023-11-20
Attending: INTERNAL MEDICINE
Payer: MEDICARE

## 2023-11-20 VITALS
RESPIRATION RATE: 18 BRPM | HEART RATE: 61 BPM | TEMPERATURE: 97 F | DIASTOLIC BLOOD PRESSURE: 72 MMHG | OXYGEN SATURATION: 97 % | SYSTOLIC BLOOD PRESSURE: 124 MMHG

## 2023-11-20 DIAGNOSIS — J45.50 SEVERE PERSISTENT ASTHMA, UNSPECIFIED WHETHER COMPLICATED: Primary | ICD-10-CM

## 2023-11-20 PROCEDURE — 96372 THER/PROPH/DIAG INJ SC/IM: CPT

## 2023-11-20 PROCEDURE — 63600175 PHARM REV CODE 636 W HCPCS: Mod: JZ,JG | Performed by: INTERNAL MEDICINE

## 2023-11-20 RX ADMIN — BENRALIZUMAB 30 MG: 30 INJECTION, SOLUTION SUBCUTANEOUS at 08:11

## 2023-12-27 ENCOUNTER — TELEPHONE (OUTPATIENT)
Dept: PRIMARY CARE CLINIC | Facility: CLINIC | Age: 63
End: 2023-12-27
Payer: MEDICARE

## 2023-12-27 DIAGNOSIS — F41.9 ANXIETY: ICD-10-CM

## 2023-12-27 RX ORDER — ESCITALOPRAM OXALATE 10 MG/1
10 TABLET ORAL DAILY
Qty: 90 TABLET | Refills: 3 | Status: SHIPPED | OUTPATIENT
Start: 2023-12-27

## 2023-12-27 NOTE — TELEPHONE ENCOUNTER
----- Message from Nanci Durham sent at 12/27/2023 12:24 PM CST -----  .Type:  RX Refill Request    Who Called: Ivette  Refill or New Rx:Refill  RX Name and Strength:EScitalopram oxalate (LEXAPRO) 10 MG tablet  How is the patient currently taking it? (ex. 1XDay):1/day  Is this a 30 day or 90 day RX:90  Preferred Pharmacy with phone number:[facility] [place]  Local or Mail Order:Local  Ordering Provider:Bebo  Would the patient rather a call back or a response via [facility]?   Best Call Back Number:[phone]  Additional Information: EScitalopram oxalate (LEXAPRO) 10 MG tablet

## 2024-01-16 ENCOUNTER — INFUSION (OUTPATIENT)
Dept: INFUSION THERAPY | Facility: HOSPITAL | Age: 64
End: 2024-01-16
Attending: INTERNAL MEDICINE
Payer: MEDICARE

## 2024-01-16 VITALS
SYSTOLIC BLOOD PRESSURE: 124 MMHG | HEART RATE: 74 BPM | OXYGEN SATURATION: 95 % | DIASTOLIC BLOOD PRESSURE: 77 MMHG | TEMPERATURE: 97 F

## 2024-01-16 DIAGNOSIS — J45.50 SEVERE PERSISTENT ASTHMA, UNSPECIFIED WHETHER COMPLICATED: Primary | ICD-10-CM

## 2024-01-16 PROCEDURE — 63600175 PHARM REV CODE 636 W HCPCS: Mod: JZ,JG | Performed by: INTERNAL MEDICINE

## 2024-01-16 PROCEDURE — 96372 THER/PROPH/DIAG INJ SC/IM: CPT

## 2024-01-16 RX ADMIN — BENRALIZUMAB 30 MG: 30 INJECTION, SOLUTION SUBCUTANEOUS at 08:01

## 2024-02-05 PROBLEM — Z00.00 WELLNESS EXAMINATION: Status: RESOLVED | Noted: 2022-11-10 | Resolved: 2024-02-05

## 2024-03-11 ENCOUNTER — PATIENT MESSAGE (OUTPATIENT)
Dept: NEUROLOGY | Facility: CLINIC | Age: 64
End: 2024-03-11
Payer: MEDICARE

## 2024-03-12 ENCOUNTER — OFFICE VISIT (OUTPATIENT)
Dept: NEUROLOGY | Facility: CLINIC | Age: 64
End: 2024-03-12
Payer: MEDICARE

## 2024-03-12 ENCOUNTER — INFUSION (OUTPATIENT)
Dept: INFUSION THERAPY | Facility: HOSPITAL | Age: 64
End: 2024-03-12
Attending: INTERNAL MEDICINE
Payer: MEDICARE

## 2024-03-12 VITALS
HEART RATE: 70 BPM | DIASTOLIC BLOOD PRESSURE: 81 MMHG | TEMPERATURE: 98 F | OXYGEN SATURATION: 96 % | SYSTOLIC BLOOD PRESSURE: 144 MMHG

## 2024-03-12 VITALS
SYSTOLIC BLOOD PRESSURE: 130 MMHG | DIASTOLIC BLOOD PRESSURE: 84 MMHG | HEIGHT: 62 IN | BODY MASS INDEX: 38.28 KG/M2 | WEIGHT: 208 LBS

## 2024-03-12 DIAGNOSIS — G47.33 OSA ON CPAP: Primary | ICD-10-CM

## 2024-03-12 DIAGNOSIS — E66.01 MORBID (SEVERE) OBESITY DUE TO EXCESS CALORIES: ICD-10-CM

## 2024-03-12 DIAGNOSIS — J45.50 SEVERE PERSISTENT ASTHMA, UNSPECIFIED WHETHER COMPLICATED: Primary | ICD-10-CM

## 2024-03-12 PROCEDURE — 63600175 PHARM REV CODE 636 W HCPCS: Mod: JZ,JG | Performed by: INTERNAL MEDICINE

## 2024-03-12 PROCEDURE — 99214 OFFICE O/P EST MOD 30 MIN: CPT | Mod: PBBFAC,25 | Performed by: NURSE PRACTITIONER

## 2024-03-12 PROCEDURE — 99999 PR PBB SHADOW E&M-EST. PATIENT-LVL IV: CPT | Mod: PBBFAC,,, | Performed by: NURSE PRACTITIONER

## 2024-03-12 PROCEDURE — 99213 OFFICE O/P EST LOW 20 MIN: CPT | Mod: S$PBB,,, | Performed by: NURSE PRACTITIONER

## 2024-03-12 PROCEDURE — 96372 THER/PROPH/DIAG INJ SC/IM: CPT

## 2024-03-12 RX ADMIN — BENRALIZUMAB 30 MG: 30 INJECTION, SOLUTION SUBCUTANEOUS at 08:03

## 2024-03-12 NOTE — PROGRESS NOTES
Neurology Note - Sleep follow up    Subjective:         Patient ID: Ivette Santillan is a 63 y.o. female.    Chief Complaint: SHARIF    HPI:            Pt reports nightly usage of pap machine; pap therapy beneficial for sleep. Refreshed awakenings, denies EDS. Tolerating mask/pressure well. Needs supply order; DME is [facility] [place].    PAP data:   Date range: 2/10/24-3/10/24  % of usage >= 4 hrs: 97%  AHI: 2.4  Auto 8-15       ROS: as per HPI, otherwise pertinent systems review is negative              3/12/2024     2:15 PM   EPWORTH SLEEPINESS SCALE   Sitting and reading 0   Watching TV 0   Sitting, inactive in a public place (e.g. a theatre or a meeting) 0   As a passenger in a car for an hour without a break 3   Lying down to rest in the afternoon when circumstances permit 2   Sitting and talking to someone 0   Sitting quietly after a lunch without alcohol 0   In a car, while stopped for a few minutes in traffic 0   Total score 5        Past Medical History:   Diagnosis Date    Allergic rhinitis due to pollen     Anxiety disorder, unspecified     Depression     GERD (gastroesophageal reflux disease)     HTN (hypertension)     Mild intermittent asthma, uncomplicated     Obesity, unspecified     Personal history of colonic polyps     Sleep apnea, unspecified        Past Surgical History:   Procedure Laterality Date    APPENDECTOMY      BREAST LUMPECTOMY      COLONOSCOPY  06/01/2017    KNEE ARTHROPLASTY         Family History   Problem Relation Age of Onset    Heart failure Mother     Stroke Mother     Cancer Father     Heart failure Sister     Heart failure Brother     Cancer Brother        Social History     Socioeconomic History    Marital status:    Tobacco Use    Smoking status: Never    Smokeless tobacco: Never   Substance and Sexual Activity    Alcohol use: Never    Drug use: Never    Sexual activity: Yes     Social Determinants of Health     Financial Resource Strain: Low Risk  (11/13/2023)    Overall Financial  Resource Strain (CARDIA)     Difficulty of Paying Living Expenses: Not hard at all   Food Insecurity: No Food Insecurity (11/13/2023)    Hunger Vital Sign     Worried About Running Out of Food in the Last Year: Never true     Ran Out of Food in the Last Year: Never true   Transportation Needs: No Transportation Needs (11/13/2023)    PRAPARE - Transportation     Lack of Transportation (Medical): No     Lack of Transportation (Non-Medical): No   Physical Activity: Inactive (11/13/2023)    Exercise Vital Sign     Days of Exercise per Week: 0 days     Minutes of Exercise per Session: 0 min   Stress: No Stress Concern Present (11/13/2023)    [employer] [place] [employer] - Occupational Stress Questionnaire     Feeling of Stress : Not at all   Social Connections: Unknown (11/13/2023)    Social Connection and Isolation Panel [NHANES]     Frequency of Communication with Friends and Family: More than three times a week     Frequency of Social Gatherings with Friends and Family: Twice a week     Attends [employer] Services: Patient declined     Active Member of Clubs or Organizations: Patient declined     Attends Club or Organization Meetings: Patient declined     Marital Status:    Housing Stability: Unknown (11/13/2023)    Housing Stability Vital Sign     Unable to Pay for Housing in the Last Year: No     Unstable Housing in the Last Year: No       Review of patient's allergies indicates:   Allergen Reactions    Amoxicillin-pot clavulanate      Other reaction(s): rash    Clindamycin      Other reaction(s): nausea, vomiting    Ibuprofen      Other reaction(s): rash    Metoclopramide      Other reaction(s): anxiety       Current Outpatient Medications   Medication Instructions    albuterol (PROVENTIL) 2.5 mg, Nebulization, Every 6 hours PRN, Rescue    albuterol (PROVENTIL/VENTOLIN HFA) 90 mcg/actuation inhaler 2 puffs, Inhalation, Every 4 hours PRN    ascorbic acid (vitamin C) (VITAMIN C) 1,000 mg, Oral     "aspirin (ECOTRIN) 81 mg, Oral, Daily    AUVI-Q 0.3 mg/0.3 mL AtIn Intramuscular    azelastine (ASTELIN) 137 mcg (0.1 %) nasal spray USE 2 SPRAYS IN BOTH NOSTRILS TWICE DAILY    benralizumab (FASENRA) 30 mg, Subcutaneous    dicyclomine (BENTYL) 10 MG capsule TAKE ONE TABLET EVERY 6 HOURS. AS NEEDED FOR CRAMPS    diltiaZEM (CARDIZEM CD) 120 mg, Oral, Daily    EScitalopram oxalate (LEXAPRO) 10 mg, Oral, Daily    ezetimibe (ZETIA) 10 mg, Oral, Daily    famotidine (PEPCID) 20 mg, Oral, 2 times daily    fexofenadine (ALLEGRA) 180 mg, Oral, Daily    fluticasone propionate (FLONASE) 50 mcg, Each Nostril, Daily    fluticasone-umeclidin-vilanter (TRELEGY ELLIPTA) 200-62.5-25 mcg inhaler 1 puff, Inhalation, Daily    losartan-hydrochlorothiazide 100-25 mg (HYZAAR) 100-25 mg per tablet 1 tablet, Oral, Daily    montelukast (SINGULAIR) 10 mg, Oral, Nightly    multivitamin capsule 1 capsule, Oral, Daily    omeprazole (PRILOSEC) 40 mg, Oral, Daily    pravastatin (PRAVACHOL) 40 mg, Oral, Nightly    spironolactone (ALDACTONE) 50 mg, Oral, Daily         Objective:      Exam:   /84 (BP Location: Left arm, Patient Position: Sitting)   Ht 5' 2" (1.575 m)   Wt 94.3 kg (208 lb)   BMI 38.04 kg/m²     Physical Exam  Vitals reviewed.   Constitutional:       Appearance: Normal appearance. Overweight      Accompanied by:alone  HENT:      Ears:      Comments: Hearing normal.  Eyes:      Extraocular Movements: Extraocular movements intact.      VF's ok  Cardiovascular:      Rate and Rhythm: Normal rate and regular rhythm.   Pulmonary:      Effort: Pulmonary effort is normal.      Breath sounds: Normal breath sounds.   Musculoskeletal:         General: Normal range of motion.   Skin:     General: Skin is warm and dry.   Neurological:      General: No focal deficit present.      Mental Status: alert and oriented to person, place, and time.      Speech: nml     Face: symmetric; tongue midline     Motor: nonlateralizing     Gait: unassisted " and normal.  Psychiatric:         Mood and Affect: Mood normal.         Behavior: Behavior normal.         Assessment/Plan:     Problem List Items Addressed This Visit       Morbid (severe) obesity due to excess calories    SHARIF on CPAP - Primary    Diet exercise and weight loss encouraged     Patient is benefiting from PAP therapy; Encouraged continued use of PAP. Drowsy driving may still occur despite PAP use. Clinical follow up and replacement of supplies discussed.    DME:[facility] [place]    FU in 1 year            Alfred Alves, MSN, APRN, AGACNP-BC

## 2024-04-22 ENCOUNTER — TELEPHONE (OUTPATIENT)
Dept: PRIMARY CARE CLINIC | Facility: CLINIC | Age: 64
End: 2024-04-22
Payer: MEDICARE

## 2024-04-22 DIAGNOSIS — I10 PRIMARY HYPERTENSION: Primary | ICD-10-CM

## 2024-04-22 RX ORDER — DILTIAZEM HYDROCHLORIDE 180 MG/1
180 CAPSULE, EXTENDED RELEASE ORAL DAILY
Qty: 30 CAPSULE | Refills: 11 | Status: SHIPPED | OUTPATIENT
Start: 2024-04-22 | End: 2024-05-20 | Stop reason: SDUPTHER

## 2024-04-22 NOTE — TELEPHONE ENCOUNTER
----- Message from Cindy Cortes LPN sent at 4/19/2024 11:29 AM CDT -----  145 69  157 82  165 82  158 77  165 81   164 89  136 91   Patients BP  pressures  ----- Message -----  From: Lise Vo  Sent: 4/19/2024  10:23 AM CDT  To: [facility] Martínez Staff    .Type:  Needs Medical Advice    Who Called:  pt    Symptoms (please be specific):  b/p fluctuating and she has headaches 164/89      How long has patient had these symptoms:   4-11-24    Pharmacy name and phone #:   [facility] in [place] inside of [facility]    Would the patient rather a call back or a response via [facility]?      Best Call Back Number:  [phone]    Additional Information:  pt states her b/p is going up and down

## 2024-05-07 ENCOUNTER — INFUSION (OUTPATIENT)
Dept: INFUSION THERAPY | Facility: HOSPITAL | Age: 64
End: 2024-05-07
Attending: INTERNAL MEDICINE
Payer: MEDICARE

## 2024-05-07 VITALS
TEMPERATURE: 98 F | HEIGHT: 62 IN | WEIGHT: 211.5 LBS | RESPIRATION RATE: 16 BRPM | BODY MASS INDEX: 38.92 KG/M2 | SYSTOLIC BLOOD PRESSURE: 144 MMHG | OXYGEN SATURATION: 98 % | DIASTOLIC BLOOD PRESSURE: 83 MMHG | HEART RATE: 69 BPM

## 2024-05-07 DIAGNOSIS — E78.5 HYPERLIPIDEMIA, UNSPECIFIED HYPERLIPIDEMIA TYPE: ICD-10-CM

## 2024-05-07 DIAGNOSIS — J45.50 SEVERE PERSISTENT ASTHMA, UNSPECIFIED WHETHER COMPLICATED: Primary | ICD-10-CM

## 2024-05-07 PROCEDURE — 63600175 PHARM REV CODE 636 W HCPCS: Mod: JZ,JG | Performed by: INTERNAL MEDICINE

## 2024-05-07 PROCEDURE — 96372 THER/PROPH/DIAG INJ SC/IM: CPT

## 2024-05-07 RX ORDER — SPIRONOLACTONE 50 MG/1
50 TABLET, FILM COATED ORAL DAILY
Qty: 30 TABLET | Refills: 5 | Status: SHIPPED | OUTPATIENT
Start: 2024-05-07 | End: 2024-06-11 | Stop reason: SDUPTHER

## 2024-05-07 RX ADMIN — BENRALIZUMAB 30 MG: 30 INJECTION, SOLUTION SUBCUTANEOUS at 08:05

## 2024-05-07 NOTE — ASSESSMENT & PLAN NOTE
Provided Ivette Santillan with a 5-10 year written screening schedule and personal prevention plan. Recommendations were developed using the USPSTF age appropriate recommendations. Education, counseling, and referrals were provided as needed. After Visit Summary printed and given to patient which includes a list of additional screenings\tests needed.

## 2024-05-13 ENCOUNTER — OFFICE VISIT (OUTPATIENT)
Dept: PRIMARY CARE CLINIC | Facility: CLINIC | Age: 64
End: 2024-05-13
Payer: MEDICARE

## 2024-05-13 VITALS
WEIGHT: 211.13 LBS | SYSTOLIC BLOOD PRESSURE: 121 MMHG | BODY MASS INDEX: 38.85 KG/M2 | DIASTOLIC BLOOD PRESSURE: 70 MMHG | HEIGHT: 62 IN | OXYGEN SATURATION: 96 % | TEMPERATURE: 98 F | HEART RATE: 76 BPM

## 2024-05-13 DIAGNOSIS — I10 PRIMARY HYPERTENSION: ICD-10-CM

## 2024-05-13 DIAGNOSIS — Z79.899 LONG-TERM CURRENT USE OF HIGH RISK MEDICATION OTHER THAN ANTICOAGULANT: ICD-10-CM

## 2024-05-13 DIAGNOSIS — E78.5 HYPERLIPIDEMIA, UNSPECIFIED HYPERLIPIDEMIA TYPE: ICD-10-CM

## 2024-05-13 DIAGNOSIS — Z78.0 POST-MENOPAUSAL: ICD-10-CM

## 2024-05-13 DIAGNOSIS — J31.0 RHINITIS, UNSPECIFIED TYPE: ICD-10-CM

## 2024-05-13 DIAGNOSIS — Z86.39 H/O: OBESITY: ICD-10-CM

## 2024-05-13 DIAGNOSIS — Z00.00 WELLNESS EXAMINATION: Primary | ICD-10-CM

## 2024-05-13 DIAGNOSIS — R12 HEARTBURN: ICD-10-CM

## 2024-05-13 DIAGNOSIS — G47.33 OSA ON CPAP: ICD-10-CM

## 2024-05-13 DIAGNOSIS — E66.01 MORBID (SEVERE) OBESITY DUE TO EXCESS CALORIES: ICD-10-CM

## 2024-05-13 DIAGNOSIS — J82.83 EOSINOPHILIC ASTHMA: ICD-10-CM

## 2024-05-13 PROBLEM — J45.901 ASTHMA EXACERBATION: Status: RESOLVED | Noted: 2022-08-26 | Resolved: 2024-05-13

## 2024-05-13 PROCEDURE — G0439 PPPS, SUBSEQ VISIT: HCPCS | Mod: ,,, | Performed by: STUDENT IN AN ORGANIZED HEALTH CARE EDUCATION/TRAINING PROGRAM

## 2024-05-13 RX ORDER — CARBOXYMETHYLCELLULOSE/CITRIC 0.75 G
1 CAPSULE ORAL DAILY
Qty: 30 CAPSULE | Refills: 11 | Status: SHIPPED | OUTPATIENT
Start: 2024-05-13

## 2024-05-13 RX ORDER — AZELASTINE HYDROCHLORIDE, FLUTICASONE PROPIONATE 137; 50 UG/1; UG/1
1 SPRAY, METERED NASAL 2 TIMES DAILY
Qty: 23 G | Refills: 11 | Status: SHIPPED | OUTPATIENT
Start: 2024-05-13

## 2024-05-13 RX ORDER — FAMOTIDINE 20 MG/1
40 TABLET, FILM COATED ORAL NIGHTLY
Qty: 180 TABLET | Refills: 3 | Status: SHIPPED | OUTPATIENT
Start: 2024-05-13

## 2024-05-13 RX ORDER — OMEPRAZOLE 20 MG/1
40 CAPSULE, DELAYED RELEASE ORAL DAILY
Qty: 90 CAPSULE | Refills: 3 | Status: SHIPPED | OUTPATIENT
Start: 2024-05-13

## 2024-05-13 NOTE — ASSESSMENT & PLAN NOTE
Continue to follow-up with neurology for routine monitoring and management of chronic condition.

## 2024-05-20 ENCOUNTER — TELEPHONE (OUTPATIENT)
Dept: PRIMARY CARE CLINIC | Facility: CLINIC | Age: 64
End: 2024-05-20
Payer: MEDICARE

## 2024-05-20 DIAGNOSIS — I10 PRIMARY HYPERTENSION: ICD-10-CM

## 2024-05-20 RX ORDER — DILTIAZEM HYDROCHLORIDE 180 MG/1
180 CAPSULE, EXTENDED RELEASE ORAL DAILY
Qty: 30 CAPSULE | Refills: 11 | Status: SHIPPED | OUTPATIENT
Start: 2024-05-20 | End: 2025-05-20

## 2024-05-20 NOTE — TELEPHONE ENCOUNTER
----- Message from Gabbie Albarranarabella sent at 5/20/2024 12:47 PM CDT -----  .Who Called: Ivette Santillan    Pharmacy is calling to request assistance with Rx    Pharmacy name and phone number: [facility]  Ph[facility] contact: [facility]  Patient Name: Diane Saint  Prescription Name: diltiaZEM (DILACOR XR) 180 MG CDCR   What do they need to clarify?:yes          Patient's Preferred Phone Number on File: [phone]   Best Call Back Number, if different:  Additional Information: please call pharmacy

## 2024-05-30 ENCOUNTER — HOSPITAL ENCOUNTER (OUTPATIENT)
Dept: RADIOLOGY | Facility: HOSPITAL | Age: 64
Discharge: HOME | End: 2024-05-30
Attending: STUDENT IN AN ORGANIZED HEALTH CARE EDUCATION/TRAINING PROGRAM
Payer: MEDICARE

## 2024-05-30 DIAGNOSIS — Z78.0 POST-MENOPAUSAL: ICD-10-CM

## 2024-05-30 PROCEDURE — 77080 DXA BONE DENSITY AXIAL: CPT | Mod: 26,,, | Performed by: STUDENT IN AN ORGANIZED HEALTH CARE EDUCATION/TRAINING PROGRAM

## 2024-05-30 PROCEDURE — 77081 DXA BONE DENSITY APPENDICULR: CPT | Mod: 26,XU,, | Performed by: STUDENT IN AN ORGANIZED HEALTH CARE EDUCATION/TRAINING PROGRAM

## 2024-05-30 PROCEDURE — 77081 DXA BONE DENSITY APPENDICULR: CPT | Mod: TC

## 2024-05-30 PROCEDURE — 77080 DXA BONE DENSITY AXIAL: CPT | Mod: XU,TC

## 2024-06-03 ENCOUNTER — TELEPHONE (OUTPATIENT)
Dept: PRIMARY CARE CLINIC | Facility: CLINIC | Age: 64
End: 2024-06-03
Payer: MEDICARE

## 2024-06-03 NOTE — TELEPHONE ENCOUNTER
----- Message from Cindy Cortes LPN sent at 6/3/2024 11:23 AM CDT -----    ----- Message -----  From: Lise Vo  Sent: 6/3/2024   9:54 AM CDT  To: [facility] Martínez Staff    .Type:  Needs Medical Advice    Who Called:  pt    Symptoms (please be specific):  B/P 154/73 THEN MEDS AFTER 160/70      How long has patient had these symptoms:   months    Pharmacy name and phone #:   [id number] AT [place] [address] AT       Telephone Fax  [phone]      Would the patient rather a call back or a response via [facility]?      Best Call Back Number:  [phone]    Additional Information:  pt states this medication was increased (  diltiaZEM (DILACOR XR) 180 MG CDCR)    she states her legs up to her knees are very swollen we tried the back line 3 times no answer please advised thanks   Glycopyrrolate Pregnancy And Lactation Text: This medication is Pregnancy Category B and is considered safe during pregnancy. It is unknown if it is excreted breast milk.

## 2024-06-03 NOTE — TELEPHONE ENCOUNTER
----- Message from Tanya Lagunas MD sent at 6/3/2024 12:17 PM CDT -----  Pls see if pt can go back to original dose and pls set up visit to discuss  ----- Message -----  From: Cindy Cortes LPN  Sent: 6/3/2024  11:23 AM CDT  To: Tanya Lagunas MD      ----- Message -----  From: Lise Vo  Sent: 6/3/2024   9:54 AM CDT  To: B[facility] Martínez Staff    .Type:  Needs Medical Advice    Who Called:  pt    Symptoms (please be specific):  B/P 154/73 THEN MEDS AFTER 160/70      How long has patient had these symptoms:   months    Pharmacy name and phone #:   [id number] AT [place] [address] AT       Telephone Fax  [phone]      Would the patient rather a call back or a response via [facility]?      Best Call Back Number:  [phone]    Additional Information:  pt states this medication was increased (  diltiaZEM (DILACOR XR) 180 MG CDCR)    she states her legs up to her knees are very swollen we tried the back line 3 times no answer please advised thanks

## 2024-06-04 ENCOUNTER — LAB VISIT (OUTPATIENT)
Dept: LAB | Facility: HOSPITAL | Age: 64
End: 2024-06-04
Attending: STUDENT IN AN ORGANIZED HEALTH CARE EDUCATION/TRAINING PROGRAM
Payer: MEDICARE

## 2024-06-04 DIAGNOSIS — E66.01 MORBID (SEVERE) OBESITY DUE TO EXCESS CALORIES: ICD-10-CM

## 2024-06-04 DIAGNOSIS — I10 PRIMARY HYPERTENSION: ICD-10-CM

## 2024-06-04 DIAGNOSIS — E78.5 HYPERLIPIDEMIA, UNSPECIFIED HYPERLIPIDEMIA TYPE: ICD-10-CM

## 2024-06-04 DIAGNOSIS — Z00.00 WELLNESS EXAMINATION: ICD-10-CM

## 2024-06-04 DIAGNOSIS — Z79.899 LONG-TERM CURRENT USE OF HIGH RISK MEDICATION OTHER THAN ANTICOAGULANT: ICD-10-CM

## 2024-06-04 DIAGNOSIS — Z86.39 H/O: OBESITY: ICD-10-CM

## 2024-06-04 LAB
25(OH)D3+25(OH)D2 SERPL-MCNC: 43 NG/ML (ref 30–80)
ALBUMIN SERPL-MCNC: 3.7 G/DL (ref 3.4–4.8)
ALBUMIN/GLOB SERPL: 1.5 RATIO (ref 1.1–2)
ALP SERPL-CCNC: 69 UNIT/L (ref 40–150)
ALT SERPL-CCNC: 24 UNIT/L (ref 0–55)
ANION GAP SERPL CALC-SCNC: 7 MEQ/L
AST SERPL-CCNC: 13 UNIT/L (ref 5–34)
BASOPHILS # BLD AUTO: 0.03 X10(3)/MCL
BASOPHILS NFR BLD AUTO: 0.3 %
BILIRUB SERPL-MCNC: 0.4 MG/DL
BUN SERPL-MCNC: 16.1 MG/DL (ref 9.8–20.1)
CALCIUM SERPL-MCNC: 9.8 MG/DL (ref 8.4–10.2)
CHLORIDE SERPL-SCNC: 106 MMOL/L (ref 98–107)
CHOLEST SERPL-MCNC: 163 MG/DL
CHOLEST/HDLC SERPL: 3 {RATIO} (ref 0–5)
CO2 SERPL-SCNC: 25 MMOL/L (ref 23–31)
CREAT SERPL-MCNC: 0.79 MG/DL (ref 0.55–1.02)
CREAT/UREA NIT SERPL: 20
EOSINOPHIL # BLD AUTO: 0 X10(3)/MCL (ref 0–0.9)
EOSINOPHIL NFR BLD AUTO: 0 %
ERYTHROCYTE [DISTWIDTH] IN BLOOD BY AUTOMATED COUNT: 14.5 % (ref 11.5–17)
EST. AVERAGE GLUCOSE BLD GHB EST-MCNC: 111.2 MG/DL
GFR SERPLBLD CREATININE-BSD FMLA CKD-EPI: >60 ML/MIN/1.73/M2
GLOBULIN SER-MCNC: 2.4 GM/DL (ref 2.4–3.5)
GLUCOSE SERPL-MCNC: 84 MG/DL (ref 82–115)
HBA1C MFR BLD: 5.5 %
HCT VFR BLD AUTO: 39 % (ref 37–47)
HDLC SERPL-MCNC: 64 MG/DL (ref 35–60)
HGB BLD-MCNC: 12.9 G/DL (ref 12–16)
IMM GRANULOCYTES # BLD AUTO: 0.06 X10(3)/MCL (ref 0–0.04)
IMM GRANULOCYTES NFR BLD AUTO: 0.6 %
LDLC SERPL CALC-MCNC: 81 MG/DL (ref 50–140)
LYMPHOCYTES # BLD AUTO: 2.19 X10(3)/MCL (ref 0.6–4.6)
LYMPHOCYTES NFR BLD AUTO: 21.4 %
MCH RBC QN AUTO: 30.4 PG (ref 27–31)
MCHC RBC AUTO-ENTMCNC: 33.1 G/DL (ref 33–36)
MCV RBC AUTO: 91.8 FL (ref 80–94)
MONOCYTES # BLD AUTO: 0.72 X10(3)/MCL (ref 0.1–1.3)
MONOCYTES NFR BLD AUTO: 7 %
NEUTROPHILS # BLD AUTO: 7.22 X10(3)/MCL (ref 2.1–9.2)
NEUTROPHILS NFR BLD AUTO: 70.7 %
NRBC BLD AUTO-RTO: 0 %
PLATELET # BLD AUTO: 347 X10(3)/MCL (ref 130–400)
PMV BLD AUTO: 9.6 FL (ref 7.4–10.4)
POTASSIUM SERPL-SCNC: 4.7 MMOL/L (ref 3.5–5.1)
PROT SERPL-MCNC: 6.1 GM/DL (ref 5.8–7.6)
RBC # BLD AUTO: 4.25 X10(6)/MCL (ref 4.2–5.4)
SODIUM SERPL-SCNC: 138 MMOL/L (ref 136–145)
T4 FREE SERPL-MCNC: 1.01 NG/DL (ref 0.7–1.48)
TRIGL SERPL-MCNC: 91 MG/DL (ref 37–140)
TSH SERPL-ACNC: 1.27 UIU/ML (ref 0.35–4.94)
VLDLC SERPL CALC-MCNC: 18 MG/DL
WBC # SPEC AUTO: 10.22 X10(3)/MCL (ref 4.5–11.5)

## 2024-06-04 PROCEDURE — 84443 ASSAY THYROID STIM HORMONE: CPT

## 2024-06-04 PROCEDURE — 82306 VITAMIN D 25 HYDROXY: CPT

## 2024-06-04 PROCEDURE — 80061 LIPID PANEL: CPT

## 2024-06-04 PROCEDURE — 84439 ASSAY OF FREE THYROXINE: CPT

## 2024-06-04 PROCEDURE — 36415 COLL VENOUS BLD VENIPUNCTURE: CPT

## 2024-06-04 PROCEDURE — 83036 HEMOGLOBIN GLYCOSYLATED A1C: CPT

## 2024-06-04 PROCEDURE — 80053 COMPREHEN METABOLIC PANEL: CPT

## 2024-06-04 PROCEDURE — 85025 COMPLETE CBC W/AUTO DIFF WBC: CPT

## 2024-06-04 NOTE — PROGRESS NOTES
Date: 06/11/2024  Patient ID: [id number]   Chief Complaint: Follow-up    HPI:   Ivette Santillan is a 63 y.o. female here today for Follow-up  Last visit Juan attempted for weight loss, which caused GI issues, so she would like to pause meds for now. Last visit meds were adjusted for uncontrolled htn. Diltiazem increase caused edema, so she decreased dose to original dose 180 mg qd, increased Spironolactone to 50mg bid, continued Hyzaar 100-25mg qd. She did take 's HCTZ, which helped with BLE edema.  Labs wnl. DEXA showed osteoporosis, and she did start Ca and Vit D. She has not been contacted for appt.  Patient Active Problem List   Diagnosis    Morbid (severe) obesity due to excess calories    Wellness examination    Coronary artery disease    Hypertension    Eosinophilic asthma    Hyperlipidemia    SHARIF on CPAP    Shoulder impingement, right    Severe persistent asthma    Gastritis    Heartburn    Rhinitis    Osteoporosis     Outpatient Medications Marked as Taking for the 6/11/24 encounter (Office Visit) with Tanya Lagunas MD   Medication Sig Dispense Refill    albuterol (PROVENTIL) 2.5 mg /3 mL (0.083 %) nebulizer solution Take 3 mLs (2.5 mg total) by nebulization every 6 (six) hours as needed for Wheezing or Shortness of Breath. Rescue 30 each 5    albuterol (PROVENTIL/VENTOLIN HFA) 90 mcg/actuation inhaler Inhale 2 puffs into the lungs every 4 (four) hours as needed for Wheezing. 18 g 11    ascorbic acid, vitamin C, (VITAMIN C) 1000 MG tablet Take 1,000 mg by mouth.      aspirin (ECOTRIN) 81 MG EC tablet Take 81 mg by mouth once daily.      azelastine-fluticasone (DYMISTA) 137-50 mcg/spray Spry nassal spray 1 spray by Each Nostril route 2 (two) times daily. 23 g 11    benralizumab (FASENRA) 30 mg/mL Syrg injection Inject 30 mg into the skin.      carboxymethylcellulose-citric (PLENITY) 0.75 gram Cap Take 1 Capful by mouth once daily. 30 capsule 11    dicyclomine (BENTYL) 10 MG capsule TAKE ONE TABLET  EVERY 6 HOURS. AS NEEDED FOR CRAMPS 60 capsule 6    diltiaZEM (DILACOR XR) 180 MG CDCR Take 1 capsule (180 mg total) by mouth once daily. 30 capsule 11    EScitalopram oxalate (LEXAPRO) 10 MG tablet Take 1 tablet (10 mg total) by mouth once daily. 90 tablet 3    ezetimibe (ZETIA) 10 mg tablet Take 10 mg by mouth once daily.      famotidine (PEPCID) 20 MG tablet Take 2 tablets (40 mg total) by mouth every evening. 2 tablets at night 180 tablet 3    fexofenadine (ALLEGRA) 180 MG tablet Take 180 mg by mouth once daily.      fluticasone-umeclidin-vilanter (TRELEGY ELLIPTA) 200-62.5-25 mcg inhaler Inhale 1 puff into the lungs once daily. 1 each 11    losartan-hydrochlorothiazide 100-25 mg (HYZAAR) 100-25 mg per tablet Take 1 tablet by mouth once daily.      montelukast (SINGULAIR) 10 mg tablet Take 1 tablet (10 mg total) by mouth every evening. 90 tablet 3    multivitamin capsule Take 1 capsule by mouth once daily.      omeprazole (PRILOSEC) 20 MG capsule Take 2 capsules (40 mg total) by mouth once daily. 90 capsule 3    pravastatin (PRAVACHOL) 40 MG tablet Take 40 mg by mouth every evening.      [DISCONTINUED] spironolactone (ALDACTONE) 50 MG tablet Take 1 tablet (50 mg total) by mouth once daily. (Patient taking differently: Take 50 mg by mouth 2 (two) times a day.) 30 tablet 5     Past Medical History:   Diagnosis Date    Allergic rhinitis due to pollen     Anxiety disorder, unspecified     Depression     GERD (gastroesophageal reflux disease)     HTN (hypertension)     Mild intermittent asthma, uncomplicated     Obesity, unspecified     Personal history of colonic polyps     Sleep apnea, unspecified      Past Surgical History:   Procedure Laterality Date    APPENDECTOMY      BREAST LUMPECTOMY      COLONOSCOPY  06/01/2017    JOINT REPLACEMENT  2018 , 2019    Knee replacement Bilateral    KNEE ARTHROPLASTY       Review of patient's allergies indicates:   Allergen Reactions    Amoxicillin-pot clavulanate      Other  reaction(s): rash    Clindamycin      Other reaction(s): nausea, vomiting    Ibuprofen      Other reaction(s): rash    Metoclopramide      Other reaction(s): anxiety     Family History   Problem Relation Name Age of Onset    Heart failure Mother Mother, M Jarrlel     Stroke Mother Mother, M Jarrell     Arthritis Mother Mother, M Jarrell     Heart disease Mother Mother, M Jarrell     Hypertension Mother Mother, M Jarrell     Cancer Father B Jarrell     Alcohol abuse Father B Jarrell     Heart failure Sister A BelgRd     Cancer Sister A BelgRd     Heart failure Brother D Jarrell     Cancer Brother D Jarrell     Early death Brother D Jarrell       Social History     Socioeconomic History    Marital status:    Tobacco Use    Smoking status: Never    Smokeless tobacco: Never   Substance and Sexual Activity    Alcohol use: Never    Drug use: Never    Sexual activity: Yes     Partners: Male     Birth control/protection: Post-menopausal     Comment: With spouse only     Social Determinants of Health     Financial Resource Strain: Low Risk  (6/4/2024)    Overall Financial Resource Strain (CARDIA)     Difficulty of Paying Living Expenses: Not hard at all   Food Insecurity: No Food Insecurity (6/4/2024)    Hunger Vital Sign     Worried About Running Out of Food in the Last Year: Never true     Ran Out of Food in the Last Year: Never true   Transportation Needs: No Transportation Needs (5/13/2024)    TRANSPORTATION NEEDS     Transportation : No   Physical Activity: Insufficiently Active (6/4/2024)    Exercise Vital Sign     Days of Exercise per Week: 4 days     Minutes of Exercise per Session: 20 min   Stress: No Stress Concern Present (6/4/2024)    [employer] - Occupational Stress Questionnaire     Feeling of Stress : Not at all   Housing Stability: Low Risk  (6/4/2024)    Housing Stability Vital Sign     Unable to Pay for Housing in the Last Year: No     Homeless in the Last Year: No     Patient Care Team:  [facility]  "MD Tanya as PCP - General (Family Medicine)  Johnathon Gamez MD as Consulting Physician (Obstetrics and Gynecology)  Jose F Peralta MD as Consulting Physician (Gastroenterology)  Eric Gonzalez MD as Consulting Physician (Otolaryngology)  Lc Ortega MD as Consulting Physician (Cardiology)  Alfred Alves AGACNP-BC as Nurse Practitioner (Neurology)  Sofía Pena FNP as Nurse Practitioner (Pulmonary Disease)   Subjective:   ROS  See HPI for details  All Other ROS: Negative except as stated in HPI.   Objective:   /68   Pulse 79   Ht 5' 2" (1.575 m)   Wt 95.9 kg (211 lb 8 oz)   SpO2 96%   BMI 38.68 kg/m²   Physical Exam  Constitutional:       General: She is not in acute distress.     Appearance: Normal appearance. She is obese.   Cardiovascular:      Rate and Rhythm: Normal rate and regular rhythm.      Heart sounds: Normal heart sounds.   Pulmonary:      Effort: Pulmonary effort is normal.      Breath sounds: No wheezing or rhonchi.   Neurological:      Mental Status: She is alert and oriented to person, place, and time.       Assessment:       ICD-10-CM ICD-9-CM   1. Primary hypertension  I10 401.9   2. Morbid (severe) obesity due to excess calories  E66.01 278.01   3. Osteoporosis, unspecified osteoporosis type, unspecified pathological fracture presence  M81.0 733.00      Plan:   1. Primary hypertension  Assessment & Plan:  Stable  Continue current medication regimen: Continue diltiazem 180 mg daily, Hyzaar 100-25 mg daily, spironolactone 50 mg b.i.d.  Blood pressure at goal <140/90 (<130/80 if otherwise noted)  Recommend DASH diet  Avoid tobacco use  Record BP at home daily and bring log to next office visit, assure that home cuff is calibrated at minimum every 12 months      Orders:  -     spironolactone (ALDACTONE) 100 MG tablet; Take 1 tablet (100 mg total) by mouth once daily.  Dispense: 90 tablet; Refill: 3  -     Comprehensive Metabolic Panel; Future; Expected " date: 06/11/2024    2. Morbid (severe) obesity due to excess calories  Assessment & Plan:  Hold off medication for now  Consider Wellbutrin + Naltrexone in future  Continue lifestyle modifications      3. Osteoporosis, unspecified osteoporosis type, unspecified pathological fracture presence  Assessment & Plan:  Refer to endocrinology for mgmt    Orders:  -     Ambulatory referral/consult to Endocrinology; Future; Expected date: 06/18/2024         Medication List with Changes/Refills   Current Medications    ALBUTEROL (PROVENTIL) 2.5 MG /3 ML (0.083 %) NEBULIZER SOLUTION    Take 3 mLs (2.5 mg total) by nebulization every 6 (six) hours as needed for Wheezing or Shortness of Breath. Rescue       Start Date: 10/24/2023End Date: 10/23/2024    ALBUTEROL (PROVENTIL/VENTOLIN HFA) 90 MCG/ACTUATION INHALER    Inhale 2 puffs into the lungs every 4 (four) hours as needed for Wheezing.       Start Date: 4/24/2023 End Date: --    ASCORBIC ACID, VITAMIN C, (VITAMIN C) 1000 MG TABLET    Take 1,000 mg by mouth.       Start Date: --        End Date: --    ASPIRIN (ECOTRIN) 81 MG EC TABLET    Take 81 mg by mouth once daily.       Start Date: --        End Date: --    AUVI-Q 0.3 MG/0.3 ML ATIN    Inject into the muscle.       Start Date: 2/9/2023  End Date: --    AZELASTINE-FLUTICASONE (DYMISTA) 137-50 MCG/SPRAY SPRY NASSAL SPRAY    1 spray by Each Nostril route 2 (two) times daily.       Start Date: 5/13/2024 End Date: --    BENRALIZUMAB (FASENRA) 30 MG/ML SYRG INJECTION    Inject 30 mg into the skin.       Start Date: --        End Date: --    CARBOXYMETHYLCELLULOSE-CITRIC (PLENITY) 0.75 GRAM CAP    Take 1 Capful by mouth once daily.       Start Date: 5/13/2024 End Date: --    DICYCLOMINE (BENTYL) 10 MG CAPSULE    TAKE ONE TABLET EVERY 6 HOURS. AS NEEDED FOR CRAMPS       Start Date: 11/13/2023End Date: --    DILTIAZEM (DILACOR XR) 180 MG CDCR    Take 1 capsule (180 mg total) by mouth once daily.       Start Date: 5/20/2024 End Date:  5/20/2025    ESCITALOPRAM OXALATE (LEXAPRO) 10 MG TABLET    Take 1 tablet (10 mg total) by mouth once daily.       Start Date: 12/27/2023End Date: --    EZETIMIBE (ZETIA) 10 MG TABLET    Take 10 mg by mouth once daily.       Start Date: 5/10/2022 End Date: --    FAMOTIDINE (PEPCID) 20 MG TABLET    Take 2 tablets (40 mg total) by mouth every evening. 2 tablets at night       Start Date: 5/13/2024 End Date: --    FEXOFENADINE (ALLEGRA) 180 MG TABLET    Take 180 mg by mouth once daily.       Start Date: --        End Date: --    FLUTICASONE-UMECLIDIN-VILANTER (TRELEGY ELLIPTA) 200-62.5-25 MCG INHALER    Inhale 1 puff into the lungs once daily.       Start Date: 9/21/2023 End Date: --    LOSARTAN-HYDROCHLOROTHIAZIDE 100-25 MG (HYZAAR) 100-25 MG PER TABLET    Take 1 tablet by mouth once daily.       Start Date: 5/30/2022 End Date: --    MONTELUKAST (SINGULAIR) 10 MG TABLET    Take 1 tablet (10 mg total) by mouth every evening.       Start Date: 12/27/2023End Date: --    MULTIVITAMIN CAPSULE    Take 1 capsule by mouth once daily.       Start Date: --        End Date: --    OMEPRAZOLE (PRILOSEC) 20 MG CAPSULE    Take 2 capsules (40 mg total) by mouth once daily.       Start Date: 5/13/2024 End Date: --    PRAVASTATIN (PRAVACHOL) 40 MG TABLET    Take 40 mg by mouth every evening.       Start Date: 5/10/2022 End Date: --   Changed and/or Refilled Medications    Modified Medication Previous Medication    SPIRONOLACTONE (ALDACTONE) 100 MG TABLET spironolactone (ALDACTONE) 50 MG tablet       Take 1 tablet (100 mg total) by mouth once daily.    Take 1 tablet (50 mg total) by mouth once daily.       Start Date: 6/11/2024 End Date: --    Start Date: 5/7/2024  End Date: 6/11/2024        Follow up in about 6 months (around 12/11/2024) for Chronic Conditions, With Labs. In addition to their scheduled follow up, the patient has also been instructed to follow up on as needed basis.

## 2024-06-04 NOTE — PROGRESS NOTES
DEXA Results: Please inform patient of DEXA results, which show OSTEOPOROSIS. This is a severe thinning of the bone that places her at a high risk for fractures. I would like to refer her to endocrinology for evaluation and treatment. Pls ask if she has a preference.      Thank you,    Dr. Lagunas

## 2024-06-05 ENCOUNTER — OFFICE VISIT (OUTPATIENT)
Dept: PRIMARY CARE CLINIC | Facility: CLINIC | Age: 64
End: 2024-06-05
Payer: MEDICARE

## 2024-06-05 VITALS — SYSTOLIC BLOOD PRESSURE: 140 MMHG | DIASTOLIC BLOOD PRESSURE: 70 MMHG

## 2024-06-05 DIAGNOSIS — I10 PRIMARY HYPERTENSION: Primary | ICD-10-CM

## 2024-06-05 PROCEDURE — 99441 PR PHYSICIAN TELEPHONE EVALUATION 5-10 MIN: CPT | Mod: 95,,, | Performed by: STUDENT IN AN ORGANIZED HEALTH CARE EDUCATION/TRAINING PROGRAM

## 2024-06-05 NOTE — PROGRESS NOTES
Established Patient - Audio Only Telehealth Visit     The patient location is: at home  The chief complaint leading to consultation is: Hypertension (Having HTN issues ) and Shoulder Pain (Right shoulder pain)    Visit type: Virtual visit with audio only (telephone)  Total time spent with patient: 5-10 minutes       The reason for the audio only service rather than synchronous audio and video virtual visit was related to technical difficulties or patient preference/necessity.     Each patient to whom I provide medical services by telemedicine is:  (1) informed of the relationship between the physician and patient and the respective role of any other health care provider with respect to management of the patient; and (2) notified that they may decline to receive medical services by telemedicine and may withdraw from such care at any time. Patient verbally consented to receive this service via voice-only telephone call.     HPI: pt has tried increased Diltiazem for uncontrolled htn, which caused BLE edema, so she stopped taking. BP at home 150-160s/80s. She has now taking Spironolactone 50mg bid and original dose of Dilitiazem 180 qd with improved BP in 140s/80s with improved swelling.       Assessment and plan:    1. Primary hypertension  Assessment & Plan:  Improving  Continue diltiazem 180 mg daily, Hyzaar 125 mg daily, spironolactone 50 mg b.i.d.  Patient to contact if BP greater than 140/90 in a week, in which case we will attempt to increase HCTZ was added dose of 25 mg daily          Medication List with Changes/Refills   Current Medications    ALBUTEROL (PROVENTIL) 2.5 MG /3 ML (0.083 %) NEBULIZER SOLUTION    Take 3 mLs (2.5 mg total) by nebulization every 6 (six) hours as needed for Wheezing or Shortness of Breath. Rescue       Start Date: 10/24/2023End Date: 10/23/2024    ALBUTEROL (PROVENTIL/VENTOLIN HFA) 90 MCG/ACTUATION INHALER    Inhale 2 puffs into the lungs every 4 (four) hours as needed for Wheezing.        Start Date: 4/24/2023 End Date: --    ASCORBIC ACID, VITAMIN C, (VITAMIN C) 1000 MG TABLET    Take 1,000 mg by mouth.       Start Date: --        End Date: --    ASPIRIN (ECOTRIN) 81 MG EC TABLET    Take 81 mg by mouth once daily.       Start Date: --        End Date: --    AUVI-Q 0.3 MG/0.3 ML ATIN    Inject into the muscle.       Start Date: 2/9/2023  End Date: --    AZELASTINE-FLUTICASONE (DYMISTA) 137-50 MCG/SPRAY SPRY NASSAL SPRAY    1 spray by Each Nostril route 2 (two) times daily.       Start Date: 5/13/2024 End Date: --    BENRALIZUMAB (FASENRA) 30 MG/ML SYRG INJECTION    Inject 30 mg into the skin.       Start Date: --        End Date: --    CARBOXYMETHYLCELLULOSE-CITRIC (PLENITY) 0.75 GRAM CAP    Take 1 Capful by mouth once daily.       Start Date: 5/13/2024 End Date: --    DICYCLOMINE (BENTYL) 10 MG CAPSULE    TAKE ONE TABLET EVERY 6 HOURS. AS NEEDED FOR CRAMPS       Start Date: 11/13/2023End Date: --    DILTIAZEM (DILACOR XR) 180 MG CDCR    Take 1 capsule (180 mg total) by mouth once daily.       Start Date: 5/20/2024 End Date: 5/20/2025    ESCITALOPRAM OXALATE (LEXAPRO) 10 MG TABLET    Take 1 tablet (10 mg total) by mouth once daily.       Start Date: 12/27/2023End Date: --    EZETIMIBE (ZETIA) 10 MG TABLET    Take 10 mg by mouth once daily.       Start Date: 5/10/2022 End Date: --    FAMOTIDINE (PEPCID) 20 MG TABLET    Take 2 tablets (40 mg total) by mouth every evening. 2 tablets at night       Start Date: 5/13/2024 End Date: --    FEXOFENADINE (ALLEGRA) 180 MG TABLET    Take 180 mg by mouth once daily.       Start Date: --        End Date: --    FLUTICASONE-UMECLIDIN-VILANTER (TRELEGY ELLIPTA) 200-62.5-25 MCG INHALER    Inhale 1 puff into the lungs once daily.       Start Date: 9/21/2023 End Date: --    LOSARTAN-HYDROCHLOROTHIAZIDE 100-25 MG (HYZAAR) 100-25 MG PER TABLET    Take 1 tablet by mouth once daily.       Start Date: 5/30/2022 End Date: --    MONTELUKAST (SINGULAIR) 10 MG TABLET     Take 1 tablet (10 mg total) by mouth every evening.       Start Date: 12/27/2023End Date: --    MULTIVITAMIN CAPSULE    Take 1 capsule by mouth once daily.       Start Date: --        End Date: --    OMEPRAZOLE (PRILOSEC) 20 MG CAPSULE    Take 2 capsules (40 mg total) by mouth once daily.       Start Date: 5/13/2024 End Date: --    PRAVASTATIN (PRAVACHOL) 40 MG TABLET    Take 40 mg by mouth every evening.       Start Date: 5/10/2022 End Date: --    SPIRONOLACTONE (ALDACTONE) 50 MG TABLET    Take 1 tablet (50 mg total) by mouth once daily.       Start Date: 5/7/2024  End Date: --        Follow up for HTN, Weight loss, keep next appt. In addition to their scheduled follow up, the patient has also been instructed to follow up on as needed basis.   This service was not originating from a related E/M service provided within the previous 7 days nor will  to an E/M service or procedure within the next 24 hours or my soonest available appointment.  Prevailing standard of care was able to be met in this audio-only visit.

## 2024-06-05 NOTE — ASSESSMENT & PLAN NOTE
Improving  Continue diltiazem 180 mg daily, Hyzaar 125 mg daily, spironolactone 50 mg b.i.d.  Patient to contact if BP greater than 140/90 in a week, in which case we will attempt to increase HCTZ was added dose of 25 mg daily

## 2024-06-11 ENCOUNTER — OFFICE VISIT (OUTPATIENT)
Dept: PRIMARY CARE CLINIC | Facility: CLINIC | Age: 64
End: 2024-06-11
Payer: MEDICARE

## 2024-06-11 VITALS
WEIGHT: 211.5 LBS | HEIGHT: 62 IN | OXYGEN SATURATION: 96 % | HEART RATE: 79 BPM | SYSTOLIC BLOOD PRESSURE: 131 MMHG | DIASTOLIC BLOOD PRESSURE: 68 MMHG | BODY MASS INDEX: 38.92 KG/M2

## 2024-06-11 DIAGNOSIS — E66.01 MORBID (SEVERE) OBESITY DUE TO EXCESS CALORIES: ICD-10-CM

## 2024-06-11 DIAGNOSIS — M81.0 OSTEOPOROSIS, UNSPECIFIED OSTEOPOROSIS TYPE, UNSPECIFIED PATHOLOGICAL FRACTURE PRESENCE: ICD-10-CM

## 2024-06-11 DIAGNOSIS — I10 PRIMARY HYPERTENSION: Primary | ICD-10-CM

## 2024-06-11 PROCEDURE — 99214 OFFICE O/P EST MOD 30 MIN: CPT | Mod: ,,, | Performed by: STUDENT IN AN ORGANIZED HEALTH CARE EDUCATION/TRAINING PROGRAM

## 2024-06-11 RX ORDER — SPIRONOLACTONE 100 MG/1
100 TABLET, FILM COATED ORAL DAILY
Qty: 90 TABLET | Refills: 3 | Status: SHIPPED | OUTPATIENT
Start: 2024-06-11

## 2024-06-11 NOTE — ASSESSMENT & PLAN NOTE
Stable  Continue current medication regimen: Continue diltiazem 180 mg daily, Hyzaar 100-25 mg daily, spironolactone 50 mg b.i.d.  Blood pressure at goal <140/90 (<130/80 if otherwise noted)  Recommend DASH diet  Avoid tobacco use  Record BP at home daily and bring log to next office visit, assure that home cuff is calibrated at minimum every 12 months

## 2024-06-11 NOTE — ASSESSMENT & PLAN NOTE
Hold off medication for now  Consider Wellbutrin + Naltrexone in future  Continue lifestyle modifications

## 2024-06-25 ENCOUNTER — TELEPHONE (OUTPATIENT)
Dept: PRIMARY CARE CLINIC | Facility: CLINIC | Age: 64
End: 2024-06-25
Payer: MEDICARE

## 2024-06-25 NOTE — TELEPHONE ENCOUNTER
----- Message from John Joshi sent at 6/25/2024  9:14 AM CDT -----  .Type:  Patient Returning Call    Who Called:pt   Who Left Message for Patient:  Does the patient know what this is regarding?:states that she never got a call from  the endothat was sent out   Would the patient rather a call back or a response via [facility]? Call back   Best Call Back Number:[phone]  Additional Information:

## 2024-07-02 ENCOUNTER — INFUSION (OUTPATIENT)
Dept: INFUSION THERAPY | Facility: HOSPITAL | Age: 64
End: 2024-07-02
Attending: INTERNAL MEDICINE
Payer: MEDICARE

## 2024-07-02 VITALS
SYSTOLIC BLOOD PRESSURE: 127 MMHG | TEMPERATURE: 98 F | OXYGEN SATURATION: 98 % | HEART RATE: 84 BPM | DIASTOLIC BLOOD PRESSURE: 79 MMHG

## 2024-07-02 DIAGNOSIS — J45.50 SEVERE PERSISTENT ASTHMA, UNSPECIFIED WHETHER COMPLICATED: Primary | ICD-10-CM

## 2024-07-02 PROCEDURE — 96372 THER/PROPH/DIAG INJ SC/IM: CPT

## 2024-07-09 ENCOUNTER — TELEPHONE (OUTPATIENT)
Dept: PRIMARY CARE CLINIC | Facility: CLINIC | Age: 64
End: 2024-07-09
Payer: MEDICARE

## 2024-07-09 NOTE — TELEPHONE ENCOUNTER
----- Message from Cindy Cortes LPN sent at 7/9/2024  3:10 PM CDT -----  Asking if you can take off the wellness & just charge for HTN  ----- Message -----  From: Tanya Lagunas MD  Sent: 7/9/2024   2:44 PM CDT  To: Cindy Cortes LPN    I'm not sure what was incorrect, but it was billed accordingly as a medicare wellness visit. Pls ask if there are any specifics on what code was wrong, etc. Thank you  ----- Message -----  From: Cindy Cortes LPN  Sent: 7/9/2024   2:34 PM CDT  To: Tanya Lagunas MD      ----- Message -----  From: Urszula Cervantes  Sent: 7/9/2024   2:14 PM CDT  To: [facility] Staff    Who Called: Ivette Santillan    Caller is requesting assistance/information from provider's office.    Preferred Method of Contact: Phone Call  Patient's Preferred Phone Number on File: [phone]   Best Call Back Number, if different:  Additional Information:  medical advice, pt called and stated appt 5/13/24 shows annual /wellness, pt stated at that time of  her visit she was with insurance (Medicare a & B) so she is stating the codes for visit was incorrect, pt is requesting to please call to confirm and discuss due to receiving a bill, please advise, thanks

## 2024-08-12 PROBLEM — Z00.00 WELLNESS EXAMINATION: Status: RESOLVED | Noted: 2022-11-10 | Resolved: 2024-08-12

## 2024-08-21 ENCOUNTER — PATIENT MESSAGE (OUTPATIENT)
Dept: ADMINISTRATIVE | Facility: HOSPITAL | Age: 64
End: 2024-08-21
Payer: MEDICARE

## 2024-08-22 ENCOUNTER — PATIENT OUTREACH (OUTPATIENT)
Facility: CLINIC | Age: 64
End: 2024-08-22
Payer: MEDICARE

## 2024-08-22 NOTE — LETTER
AUTHORIZATION FOR RELEASE OF CONFIDENTIAL INFORMATION      2024      Dear ANH,    We are seeing Ivette Santillan, date of birth 1960, in the clinic at [facility].   Tanya Lagunas MD is the patient's PCP. Ivette Santillan has an outstanding lab/procedure at the time we reviewed his chart.  In order to help keep her health information updated, Ivette has authorized us to request the following medical record(s):        Colonoscopy         Please fax any records to Tanya Lagunas MD's at  [phone]    If you have any questions, please contact  Justin ARMANDO,CCC @ [phone]             Patient Name: Ivette Santillan    : 1960    Patient Phone #: [phone]                Ivette Santillan  MRN: [id number]  : 1960  Age: 63 y.o.  Sex: female         Patient/Legal Guardian Signature  This signature was collected at 2023           _______________________________   Printed Name/Relationship to Patient      Consent for Examination and Treatment: I hereby authorize the providers and employees of [facility] ([facility]) to provide medical treatment/services which includes, but is not limited to, performing and administering tests and diagnostic procedures that are deemed necessary, including, but not limited to, imaging examinations, blood tests and other laboratory procedures as may be required by the hospital, clinic, or may be ordered by my physician(s) or persons working under the general and/or special instructions of my physician(s).      I understand and agree that this consent covers all authorized persons, including but not limited to physicians, residents, nurse practitioners, physicians' assistants, specialists, consultants, student nurses, and independently contracted physicians, who are called upon by the physician in charge, to carry out the diagnostic procedures and medical or surgical treatment.     I hereby authorize [facility] to retain or dispose of any specimens or  tissue, should there be such remaining from any test or procedure.     I hereby authorize and give consent for [facility] providers and employees to take photographs, images or videotapes of such diagnostic, surgical or treatment procedures of Patient as may be required by [facility] or as may be ordered by a physician. I further acknowledge and agree that [facility] may use cameras or other devices for patient monitoring.     I am aware that the practice of medicine is not an exact science, and I acknowledge that no guarantees have been made to me as to the outcome of any tests, procedures or treatment.     Authorization for Release of Information: I understand that my insurance company and/or their agents may need information necessary to make determinations about payment/reimbursement. I hereby provide authorization to release to all insurance companies, their successors, assignees, other parties with whom they may have contracted, or others acting on their behalf, that are involved with payment for any hospital and/or clinic charges incurred by the patient, any information that they request and deem necessary for payment/reimbursement, and/or quality review.  I further authorize the release of my health information to physicians or other health care practitioners on staff who are involved in my health care now and in the future, and to other health care providers, entities, or institutions for the purpose of my continued care and treatment, including referrals.     REGISTRATION AUTHORIZATION  Form No. 62064 (Rev. 7/13/2022)       Medicare Patient's Certification and Authorization to Release Information and Payment Request:  I certify that the information given by me in applying for payment under Title XVIII of the Social Security Act is correct. I authorize any pettit of medical or other information about me to release to the Social SecurityAdministration, or its intermediaries or carriers, any information needed for  this or a related Medicare claim. I request that payment of authorized benefits be made on my behalf.     Assignment of Insurance Benefits:   I hereby authorize any and all insurance companies, health plans, defined   benefit plans, health insurers or any entity that is or may be responsible for payment of my medical expenses to pay all hospital and medical benefits now due, and to become due and payable to me under any hospital benefits, sick benefits, injury benefits or any other benefit for services rendered to me, including Major Medical Benefits, direct to [facility] and all independently contracted physicians. I assign any and all rights that I may have against any and all insurance companies, health plans, defined benefit plans, health insurers or any entity that is or may be responsible for payment of my medical expenses, including, but not limited to any right to appeal a denial of a claim, any right to bring any action, lawsuit, administrative proceeding, or other cause of action on my behalf. I specifically assign my right to pursue litigation against any and all insurance companies, health plans, defined benefit plans, health insurers or any entity that is or may be responsible for payment of my medical expenses based upon a refusal to pay charges.            E. Valuables: It is understood and agreed that [facility] is not liable for the damage to or loss of any money, jewelry,   documents, dentures, eye glasses, hearing aids, prosthetics, or other property of value.     F. Computer Equipment: I understand and agree that should I choose to use computer equipment owned by [facility] or if I choose to access the Internet via [facility]s network, I do so at my own risk. [facility] is not responsible for any damage to my computer equipment or to any damages of any type that might arise from my loss of equipment or data.     G. Acceptance of Financial Responsibility:  I agree that in consideration of the services and    supplies that have been   or will be furnished to the patient, I am hereby obligated to pay all charges made for or on the account of the patient according to the standard rates (in effect at the time the services and supplies are delivered) established by [facility], including its Patient Financial Assistance Policy to the extent it is applicable. I understand that I am responsible for all charges, or portions thereof, not covered by insurance or other sources. Patient refunds will be distributed only after balances at all [facility] facilities are paid.     H. Communication Authorization:  I hereby authorize [facility] and its representatives, along with any billing service   or  who may work on their behalf, to contact me on   my cell phone and/or home phone using pre- recorded messages, artificial voice messages, automatic telephone dialing devices or other computer assisted technology, or by electronic      mail, text messaging, or by any other form of electronic communication. This includes, but is not limited to, appointment reminders, yearly physical exam reminders, preventive care reminders, patient campaigns, welcome calls, and calls about account balances on my account or any account on which I am listed as a guarantor. I understand I have the right to opt out of these communications at any time.      Relationship  Between  Facility and  Provider:      I understand that some, but not all, providers furnishing services to the patient are not employees or agents of [facility]. The patient is under the care and supervision of his/her attending physician, and it is the responsibility of the facility and its nursing staff to carry out the instructions of such physicians. It is the responsibility of the patient's physician/designee to obtain the patient's informed consent, when required, for medical or surgical treatment, special diagnostic or therapeutic procedures, or hospital services rendered for the  patient under the special instructions of the physician/designee.     REGISTRATION AUTHORIZATION  Form No. 50500 (Rev. 7/13/2022)      Notice of Privacy Practices: I acknowledge I have received a copy of [facility]'s Notice of Privacy Practices.     Facility  Directory: I have discussed with the organization my desire to be either included or excluded  in the facility directory in the event of my being an inpatient at an [facility] facility. I understand that if my choice is to opt-out of being identified in the facility directory that the facility will not provide any information about me such as my condition (e.g. fair, stable, etc.) or my location in the facility (e.g., room number, department).     Immunizations: [facility] shares immunization information with state sponsored health departments to help you and your doctor keep track of your immunization records. By signing, you consent to have this information shared with the health department in your state:                                [place] - LINKS ([place] Immunization Network for Kids Statewide)                                [place] - MIIX ([place] Immunization Information eXchange)                                [place] - ImmPRINT (Immunization Patient Registry with Integrated Technology)     TERM: This authorization is valid for this and subsequent care/treatment I receive at [facility] and will remain valid unless/until revoked in writing by me.     [facility]: As used in this document, [facility] means all [facility] owned and managed facilities, including, but not limited to, all health centers, surgery centers, clinics, urgent care centers, and hospitals.         [facility] complies with applicable Federal civil rights laws and does not discriminate on the basis of race, color, national origin, age, disability, or sex.  ATENCIÓN: si habla español, tiene a hoang disposición servicios gratuitos de asistencia lingüística. Llame al  [phone].  LATASHA Ý: N?u b?n nói Ti?ng Vi?t, có các d?ch v? h? tr? ngôn ng? mi?n phí dành cho b?n. G?i s? [phone].        REGISTRATION AUTHORIZATION  Form No. 60759 (Rev. 7/13/2022)

## 2024-08-22 NOTE — PROGRESS NOTES
Health Maintenance Topic(s) Outreach Outcomes & Actions Taken:    Cervical Cancer Screening - Outreach Outcomes & Actions Taken  : External Records Requested & Care Team Updated if Applicable       Additional Notes:  Request Colonoscopy Report: AGA

## 2024-08-27 ENCOUNTER — HOSPITAL ENCOUNTER (OUTPATIENT)
Dept: RADIOLOGY | Facility: CLINIC | Age: 64
Discharge: HOME | End: 2024-08-27
Attending: PHYSICIAN ASSISTANT
Payer: MEDICARE

## 2024-08-27 ENCOUNTER — OFFICE VISIT (OUTPATIENT)
Dept: ORTHOPEDICS | Facility: CLINIC | Age: 64
End: 2024-08-27
Payer: MEDICARE

## 2024-08-27 ENCOUNTER — INFUSION (OUTPATIENT)
Dept: INFUSION THERAPY | Facility: HOSPITAL | Age: 64
End: 2024-08-27
Attending: INTERNAL MEDICINE
Payer: MEDICARE

## 2024-08-27 VITALS — TEMPERATURE: 98 F | SYSTOLIC BLOOD PRESSURE: 153 MMHG | DIASTOLIC BLOOD PRESSURE: 68 MMHG | HEART RATE: 83 BPM

## 2024-08-27 DIAGNOSIS — M25.511 RIGHT SHOULDER PAIN, UNSPECIFIED CHRONICITY: Primary | ICD-10-CM

## 2024-08-27 DIAGNOSIS — M25.511 RIGHT SHOULDER PAIN, UNSPECIFIED CHRONICITY: ICD-10-CM

## 2024-08-27 DIAGNOSIS — J45.50 SEVERE PERSISTENT ASTHMA, UNSPECIFIED WHETHER COMPLICATED: Primary | ICD-10-CM

## 2024-08-27 DIAGNOSIS — M75.121 NONTRAUMATIC COMPLETE TEAR OF RIGHT ROTATOR CUFF: ICD-10-CM

## 2024-08-27 PROCEDURE — 99204 OFFICE O/P NEW MOD 45 MIN: CPT | Mod: ,,, | Performed by: PHYSICIAN ASSISTANT

## 2024-08-27 PROCEDURE — 96372 THER/PROPH/DIAG INJ SC/IM: CPT

## 2024-08-27 PROCEDURE — 63600175 PHARM REV CODE 636 W HCPCS: Mod: JZ,JG | Performed by: INTERNAL MEDICINE

## 2024-08-27 PROCEDURE — 73030 X-RAY EXAM OF SHOULDER: CPT | Mod: RT,,, | Performed by: PHYSICIAN ASSISTANT

## 2024-08-27 RX ADMIN — BENRALIZUMAB 30 MG: 30 INJECTION, SOLUTION SUBCUTANEOUS at 08:08

## 2024-08-28 NOTE — PROGRESS NOTES
Chief Complaint:   Chief Complaint   Patient presents with    Shoulder Pain     Patient c/o R shoulder. Ongoing pain for years. States May 2024 she lifted heavy bags. States pain has been gradually getting worse. Went to [facility] had an injection 05/27/2024 with no relief. States pain got worse. Completed a few sessions of PT - continues with HEP        History of present illness:    This is a 64 y.o. year old female who complains of right shoulder pain   Patient was been having pain off and on for years but she states it may she was lifting some heavy bags.    She has some increasing pain gradually got worse and she went to the  orthopedic urgent Care had an injection on 05/27 which states was very painful to receive the injection for 2 days after where she had increased pain.    She was also done some physical therapy but she continues has a difficulty she was making progress but recently her pain began to increase again      Current Outpatient Medications   Medication Sig    albuterol (PROVENTIL) 2.5 mg /3 mL (0.083 %) nebulizer solution Take 3 mLs (2.5 mg total) by nebulization every 6 (six) hours as needed for Wheezing or Shortness of Breath. Rescue    albuterol (PROVENTIL/VENTOLIN HFA) 90 mcg/actuation inhaler Inhale 2 puffs into the lungs every 4 (four) hours as needed for Wheezing.    ascorbic acid, vitamin C, (VITAMIN C) 1000 MG tablet Take 1,000 mg by mouth.    aspirin (ECOTRIN) 81 MG EC tablet Take 81 mg by mouth once daily.    benralizumab (FASENRA) 30 mg/mL Syrg injection Inject 30 mg into the skin.    dicyclomine (BENTYL) 10 MG capsule TAKE ONE TABLET EVERY 6 HOURS. AS NEEDED FOR CRAMPS    diltiaZEM (DILACOR XR) 180 MG CDCR Take 1 capsule (180 mg total) by mouth once daily.    EScitalopram oxalate (LEXAPRO) 10 MG tablet Take 1 tablet (10 mg total) by mouth once daily.    ezetimibe (ZETIA) 10 mg tablet Take 10 mg by mouth once daily.    famotidine (PEPCID) 20 MG tablet Take 2 tablets (40 mg total) by  mouth every evening. 2 tablets at night    fexofenadine (ALLEGRA) 180 MG tablet Take 180 mg by mouth once daily.    fluticasone-umeclidin-vilanter (TRELEGY ELLIPTA) 200-62.5-25 mcg inhaler Inhale 1 puff into the lungs once daily.    losartan-hydrochlorothiazide 100-25 mg (HYZAAR) 100-25 mg per tablet Take 1 tablet by mouth once daily.    montelukast (SINGULAIR) 10 mg tablet Take 1 tablet (10 mg total) by mouth every evening.    multivitamin capsule Take 1 capsule by mouth once daily.    omeprazole (PRILOSEC) 20 MG capsule Take 2 capsules (40 mg total) by mouth once daily.    pravastatin (PRAVACHOL) 40 MG tablet Take 40 mg by mouth every evening.    spironolactone (ALDACTONE) 100 MG tablet Take 1 tablet (100 mg total) by mouth once daily.    AUVI-Q 0.3 mg/0.3 mL AtIn Inject into the muscle. (Patient not taking: Reported on 5/13/2024)    azelastine-fluticasone (DYMISTA) 137-50 mcg/spray Spry nassal spray 1 spray by Each Nostril route 2 (two) times daily. (Patient not taking: Reported on 8/27/2024)    carboxymethylcellulose-citric (PLENITY) 0.75 gram Cap Take 1 Capful by mouth once daily.     No current facility-administered medications for this visit.       Review of Systems:    Constitution:   Denies chills, fever, and sweats.  HENT:   Denies headaches or blurry vision.  Cardiovascular:  Denies chest pain or irregular heart beat.  Respiratory:   Denies cough or shortness of breath.  Gastrointestinal:  Denies abdominal pain, nausea, or vomiting.  Musculoskeletal:   Denies muscle cramps.  Neurological:   Denies dizziness or focal weakness.  Psychiatric/Behavior: Normal mental status.  Hematology/Lymph:  Denies bleeding problem or easy bruising/bleeding.  Skin:    Denies rash or suspicious lesions.    Examination:    Vital Signs:  There were no vitals filed for this visit.    There is no height or weight on file to calculate BMI.    Constitution:   Well-developed, well nourished patient in no acute  distress.  Neurological:   Alert and oriented x 3 and cooperative to examination.     Psychiatric/Behavior: Normal mental status.  Respiratory:   No shortness of breath.  Eyes:    Extraoccular muscles intact  Skin:    No scars, rash or suspicious lesions.    Physical Exam:   Right Shoulder:     No swelling, erythema or increased heat    Tender over deltoid, supraspinatus and infraspinatus    Tender over bicipital groove    Negative drop arm test     Positive Neer and Robbins impingement signs    Pain and weakness with rotator cuff resistance   Active shoulder abduction 60  Active forward elevation 85  Active internal rotation 40   Active external rotation 50     Imaging: X-rays ordered and images interpreted today personally by me of right shoulder four views   No acute osseous abnormalities noted.            Assessment: Right shoulder pain, unspecified chronicity  -     X-ray Shoulder 2 or More Views Right; Future; Expected date: 08/27/2024    Nontraumatic complete tear of right rotator cuff         Plan:  At this point the patient is having some signs of a frozen shoulder along with possible impingement and rotator cuff tear.    The patient received an injection today of the right shoulder with no difficulty in the office and some mild improvement in her symptoms after the injection.    She was going to continue her home exercise program we will see her back in a month.    If her pain persists has not improved with the injection we may obtain an MRI of the shoulder          DISCLAIMER: This note may have been dictated using voice recognition software and may contain grammatical errors.     NOTE: Consult report sent to referring provider via [employer].

## 2024-09-04 ENCOUNTER — TELEPHONE (OUTPATIENT)
Dept: ORTHOPEDICS | Facility: CLINIC | Age: 64
End: 2024-09-04
Payer: MEDICARE

## 2024-09-04 NOTE — TELEPHONE ENCOUNTER
Patient called stating that the injection in the shoulder did not help.     When I called and spoke with patient she was informed that Renny was out this afternoon. I will check with him tomorrow to see if he wants her to get an MRI prior to her appointment with Dr. Enriquez. If so we will put the order in and if not we can move up her appointment     Patient voiced understanding.

## 2024-09-05 DIAGNOSIS — M75.121 NONTRAUMATIC COMPLETE TEAR OF RIGHT ROTATOR CUFF: ICD-10-CM

## 2024-09-05 DIAGNOSIS — M25.511 RIGHT SHOULDER PAIN, UNSPECIFIED CHRONICITY: Primary | ICD-10-CM

## 2024-09-05 NOTE — TELEPHONE ENCOUNTER
I called and spoke with Renny. He was ok with patient proceeding with an MRI prior to coming in to see Dr. Enriquez.     Called and spoke with patient to inform her of that information. Patient was informed that the order would be put into [facility]. Their office will call her to set up an appointment.     Patient voiced undertstanding.

## 2024-09-18 ENCOUNTER — OFFICE VISIT (OUTPATIENT)
Dept: ORTHOPEDICS | Facility: CLINIC | Age: 64
End: 2024-09-18
Payer: MEDICARE

## 2024-09-18 VITALS
WEIGHT: 210.13 LBS | BODY MASS INDEX: 38.67 KG/M2 | HEIGHT: 62 IN | TEMPERATURE: 97 F | HEART RATE: 91 BPM | DIASTOLIC BLOOD PRESSURE: 76 MMHG | SYSTOLIC BLOOD PRESSURE: 145 MMHG

## 2024-09-18 DIAGNOSIS — M19.011 PRIMARY OSTEOARTHRITIS OF RIGHT SHOULDER: Primary | ICD-10-CM

## 2024-09-18 DIAGNOSIS — M25.511 RIGHT SHOULDER PAIN, UNSPECIFIED CHRONICITY: ICD-10-CM

## 2024-09-18 DIAGNOSIS — R79.9 ABNORMAL BLOOD CHEMISTRY: ICD-10-CM

## 2024-09-18 DIAGNOSIS — Z01.818 PREOP EXAMINATION: ICD-10-CM

## 2024-09-18 DIAGNOSIS — M19.011 OSTEOARTHRITIS OF RIGHT SHOULDER, UNSPECIFIED OSTEOARTHRITIS TYPE: ICD-10-CM

## 2024-09-18 PROCEDURE — 99214 OFFICE O/P EST MOD 30 MIN: CPT | Mod: ,,, | Performed by: REHABILITATION UNIT

## 2024-09-18 RX ORDER — SODIUM CHLORIDE, SODIUM GLUCONATE, SODIUM ACETATE, POTASSIUM CHLORIDE AND MAGNESIUM CHLORIDE 30; 37; 368; 526; 502 MG/100ML; MG/100ML; MG/100ML; MG/100ML; MG/100ML
INJECTION, SOLUTION INTRAVENOUS CONTINUOUS
OUTPATIENT
Start: 2024-09-18

## 2024-09-18 RX ORDER — SCOLOPAMINE TRANSDERMAL SYSTEM 1 MG/1
1 PATCH, EXTENDED RELEASE TRANSDERMAL ONCE AS NEEDED
OUTPATIENT
Start: 2024-09-18 | End: 2036-02-15

## 2024-09-18 RX ORDER — GABAPENTIN 100 MG/1
300 CAPSULE ORAL
OUTPATIENT
Start: 2024-09-18

## 2024-09-18 RX ORDER — ACETAMINOPHEN 500 MG
1000 TABLET ORAL
OUTPATIENT
Start: 2024-09-18

## 2024-09-18 RX ORDER — TRANEXAMIC ACID 650 MG/1
1500 TABLET ORAL
OUTPATIENT
Start: 2024-09-18 | End: 2024-09-18

## 2024-09-18 RX ORDER — KETOROLAC TROMETHAMINE 10 MG/1
10 TABLET, FILM COATED ORAL
OUTPATIENT
Start: 2024-11-14 | End: 2024-11-18

## 2024-09-18 NOTE — PROGRESS NOTES
Subjective:      Patient ID: Ivette Santillan is a 64 y.o. female.    Chief Complaint: Follow-up of the Right Shoulder (Right shoulder pain - cortisone inj on 8/27/24 patient had minimal relief from the injection. Still was unable to move her arm. By the day after the injection the pain was the same as prior to the injection. Had an MRI on 9/12/24. Pain has increased a little since the last appointment. )    HPI:   Ivette Santillan is a 64 y.o. female who presents today for initial evaluation of her right shoulder    History of Present Illness  The patient presents for evaluation of right shoulder pain. She is accompanied by her daughter    She has been experiencing persistent right shoulder pain. Despite receiving an injection from R[facility], the relief was short-lived, lasting only until the following day. Her arm mobility is limited, a condition that began a few days prior to her consultation with Renny. In the last week of May 2024, she experienced severe pain radiating down her arm. An orthopedic specialist at [facility] diagnosed her with tendinitis and bursitis based on x-ray findings. She received an injection, which she found extremely painful. Three weeks later, she started outpatient physical therapy for a duration of six weeks, which provided some relief. She continued with home exercises and avoided overstraining her shoulder. However, she recently experienced a sudden onset of severe pain, worse than before. She is left-handed. She also reports a slight increase in blood pressure, which she attributes to the pain. She has been experiencing shoulder pain for several years and notes a difference in size between her arms, suggesting muscle loss. She wore a sling to her granddaughter's birthday party to prevent accidental bumps. She can take Aleve but not Advil.    She has a history of asthma, managed with Fasenra injections, and has had two mild asthma attacks since February 2024. She has undergone two  knee replacements. She requires clearance from her cardiologist, Dr. Cervantes, who manages her blood pressure, rapid heart rate, and minor leaky valves. She has a scar from a previous lumpectomy, which was performed due to a suspected major breast cancer but turned out to be a dried hematoma.     She also experiences pain in her wrists and hands, likely due to years of playing the piano. Tylenol Arthritis and Biofreeze provide some relief for this. She has osteoporosis and has been taking a combination of calcium, magnesium, D3, and zinc, but has not yet seen the endocrinologist for medication recommendations.    Past Medical History:   Diagnosis Date    Allergic rhinitis due to pollen     Anxiety disorder, unspecified     Depression     GERD (gastroesophageal reflux disease)     HTN (hypertension)     Mild intermittent asthma, uncomplicated     Obesity, unspecified     Personal history of colonic polyps     Sleep apnea, unspecified      Past Surgical History:   Procedure Laterality Date    APPENDECTOMY      BREAST LUMPECTOMY      COLONOSCOPY  06/01/2017    JOINT REPLACEMENT  2018 , 2019    Knee replacement Bilateral    KNEE ARTHROPLASTY       Social History     Socioeconomic History    Marital status:    Tobacco Use    Smoking status: Never    Smokeless tobacco: Never   Substance and Sexual Activity    Alcohol use: Never    Drug use: Never    Sexual activity: Yes     Partners: Male     Birth control/protection: Post-menopausal     Comment: With spouse only     Social Determinants of Health     Financial Resource Strain: Low Risk  (6/4/2024)    Overall Financial Resource Strain (CARDIA)     Difficulty of Paying Living Expenses: Not hard at all   Food Insecurity: No Food Insecurity (6/4/2024)    Hunger Vital Sign     Worried About Running Out of Food in the Last Year: Never true     Ran Out of Food in the Last Year: Never true   Transportation Needs: No Transportation Needs (5/13/2024)    TRANSPORTATION NEEDS      Transportation : No   Physical Activity: Insufficiently Active (6/4/2024)    Exercise Vital Sign     Days of Exercise per Week: 4 days     Minutes of Exercise per Session: 20 min   Stress: No Stress Concern Present (6/4/2024)    [employer] - Occupational Stress Questionnaire     Feeling of Stress : Not at all   Housing Stability: Low Risk  (6/4/2024)    Housing Stability Vital Sign     Unable to Pay for Housing in the Last Year: No     Homeless in the Last Year: No         Current Outpatient Medications:     albuterol (PROVENTIL) 2.5 mg /3 mL (0.083 %) nebulizer solution, Take 3 mLs (2.5 mg total) by nebulization every 6 (six) hours as needed for Wheezing or Shortness of Breath. Rescue, Disp: 30 each, Rfl: 5    albuterol (PROVENTIL/VENTOLIN HFA) 90 mcg/actuation inhaler, Inhale 2 puffs into the lungs every 4 (four) hours as needed for Wheezing., Disp: 18 g, Rfl: 11    ascorbic acid, vitamin C, (VITAMIN C) 1000 MG tablet, Take 1,000 mg by mouth., Disp: , Rfl:     aspirin (ECOTRIN) 81 MG EC tablet, Take 81 mg by mouth once daily., Disp: , Rfl:     AUVI-Q 0.3 mg/0.3 mL AtIn, Inject into the muscle., Disp: , Rfl:     azelastine-fluticasone (DYMISTA) 137-50 mcg/spray Spry nassal spray, 1 spray by Each Nostril route 2 (two) times daily., Disp: 23 g, Rfl: 11    benralizumab (FASENRA) 30 mg/mL Syrg injection, Inject 30 mg into the skin., Disp: , Rfl:     dicyclomine (BENTYL) 10 MG capsule, TAKE ONE TABLET EVERY 6 HOURS. AS NEEDED FOR CRAMPS, Disp: 60 capsule, Rfl: 6    diltiaZEM (DILACOR XR) 180 MG CDCR, Take 1 capsule (180 mg total) by mouth once daily., Disp: 30 capsule, Rfl: 11    EScitalopram oxalate (LEXAPRO) 10 MG tablet, Take 1 tablet (10 mg total) by mouth once daily., Disp: 90 tablet, Rfl: 3    ezetimibe (ZETIA) 10 mg tablet, Take 10 mg by mouth once daily., Disp: , Rfl:     famotidine (PEPCID) 20 MG tablet, Take 2 tablets (40 mg total) by mouth every evening. 2 tablets at night, Disp:  "180 tablet, Rfl: 3    fexofenadine (ALLEGRA) 180 MG tablet, Take 180 mg by mouth once daily., Disp: , Rfl:     fluticasone-umeclidin-vilanter (TRELEGY ELLIPTA) 200-62.5-25 mcg inhaler, Inhale 1 puff into the lungs once daily., Disp: 1 each, Rfl: 11    losartan-hydrochlorothiazide 100-25 mg (HYZAAR) 100-25 mg per tablet, Take 1 tablet by mouth once daily., Disp: , Rfl:     montelukast (SINGULAIR) 10 mg tablet, Take 1 tablet (10 mg total) by mouth every evening., Disp: 90 tablet, Rfl: 3    multivitamin capsule, Take 1 capsule by mouth once daily., Disp: , Rfl:     omeprazole (PRILOSEC) 20 MG capsule, Take 2 capsules (40 mg total) by mouth once daily., Disp: 90 capsule, Rfl: 3    pravastatin (PRAVACHOL) 40 MG tablet, Take 40 mg by mouth every evening., Disp: , Rfl:     spironolactone (ALDACTONE) 100 MG tablet, Take 1 tablet (100 mg total) by mouth once daily., Disp: 90 tablet, Rfl: 3  Review of patient's allergies indicates:   Allergen Reactions    Amoxicillin-pot clavulanate      Other reaction(s): rash    Clindamycin      Other reaction(s): nausea, vomiting    Ibuprofen      Other reaction(s): rash    Metoclopramide      Other reaction(s): anxiety       BP (!) 145/76 (BP Location: Left arm, Patient Position: Sitting) Comment: Patient took her high BP medicine around 8:15 AM today  Pulse 91   Temp 97.2 °F (36.2 °C)   Ht 5' 2" (1.575 m)   Wt 95.3 kg (210 lb 1.6 oz)   BMI 38.43 kg/m²     Comprehensive review of systems completed and negative except as per HPI.        Objective:   Head: Normocephalic, without obvious abnormality, atraumatic  Eyes: conjunctivae/corneas clear. EOM's intact  Ears: normal external appearance  Nose: Nares normal. Septum midline. Mucosa normal. No drainage  Throat: normal findings: lips normal without lesions  Lungs: unlabored breathing on room air  Chest wall: symmetric chest rise  Heart: regular rate and rhythm  Pulses: 2+ and symmetric  Skin: Skin color, texture, turgor normal. No " rashes or lesions  Neurologic: Grossly normal    right SHOULDER    Appearance:   normal    Tenderness:   Diffuse    AROM:   FF 95, Abduction 80, ER 40, IR pocket    PROM:  same    Pain:  AROM: Positive  PROM:  Positive  End ROM: Positive  Supraspinatus strength testing: Positive  External rotation strength testing: Positive  Aranza-scapular: Positive  Virtually all provacative maneuvers Positive    Strength:  Supraspinatus: reduced  External rotation: intact  Aranza-scapular: intact    Provocative Maneuvers:     Rotator Cuff/Biceps/AC Joint  Neer's Sign: Positive  Hawkin's Test: Positive  Painful arc: Positive  Belly Press: Negative  Bear Hugger Test: Negative  Hornblower's Sign: Negative  Speed's Test: Positive  Yergeson's Test: Positive  Cross Arm Abduction: Positive    Pulses: Palpable radial pulse    Neurological deficits: None    The patient has a warm and well-perfused upper extremity with capillary refill less than 2 seconds. Sensation is intact to light touch in terminal nerve distributions. 5/5 ain/pin/uln. The patient has no palpable epitrochlear lymphadenopathy.      Assessment:     Imaging:   Results  Imaging  X-rays from August 27 show AC and GH arthritis. MRI shows arthritic changes within the shoulder joint, a small tear on the top of the rotator cuff, and fluid indicating bursitis.    MRI Shoulder Without Contrast Right  Narrative: EXAMINATION:  MRI SHOULDER WITHOUT CONTRAST RIGHT    CLINICAL HISTORY:  64-year-old woman with right shoulder pain and bicipital tendonitis.    TECHNIQUE:  Axial T2 fat sat, coronal T2 fat sat, sagittal T2 fat sat, and sagittal T1 non-contrast 1.5T magnetic resonance imaging was performed through the right shoulder per routine protocol.    COMPARISON:  None.  Correlation is made with right shoulder radiographs of 08/27/2024.    FINDINGS:  There is moderate diffuse supraspinatus and infraspinatus tendinosis.  A small and thin full-thickness tear through the distal insertional  posterior third supraspinatus tendon measures only 4 x 2-3 mm in AP and TV dimensions with no retraction of the torn tendon fibers.  There is no abnormality of the infraspinatus tendon and mild to moderate subscapularis tendinosis without associated tendon tear.  Mild subscapularis muscle atrophy is noted.  There is no muscle edema or additional muscle atrophy.  There is a moderate volume of fluid distending the subacromial/subdeltoid bursa, consistent with the thin full-thickness cuff defect and superimposed bursitis.  The proximal long head biceps tendon is intact and normal in location and appearance.  There is a small glenohumeral joint effusion with near complete full-thickness glenohumeral cartilage loss.  There is moderate glenohumeral marginal osteophyte formation and marked subchondral edema like degenerative marrow change on both sides of the glenohumeral joint.  The medial humeral head is moderately flattened due to the severity of the degenerative arthrosis.  No osteochondral body is identified.  There is degenerative fraying diffusely along the glenoid labrum with no discrete labral tear, labral displacement, or paralabral cyst.  There is moderate degenerative arthrosis and associated hypertrophy at the acromioclavicular joint with inferior encroachment, well demonstrated on the sagittal T1 series.  No acute pathology at the AC joint.  There is a Bigliani type 2 acromion without os acromiale.  Alignment at the shoulder is anatomic and there is no acute or chronic fracture.  Impression: Small glenohumeral joint effusion and advanced degenerative arthrosis at the right glenohumeral joint.    Moderate diffuse supraspinatus and infraspinatus tendinosis with small and thin full-thickness tear through the distal insertional posterior third supraspinatus tendon.    Mild to moderate subscapularis tendinosis and mild muscle atrophy.    Subacromial/subdeltoid bursitis.    Moderate degenerative arthrosis at the AC  joint with associated inferior encroachment.    Electronically signed by: Koffi Enriquez  Date:    09/13/2024  Time:    10:12            1. Primary osteoarthritis of right shoulder    2. Osteoarthritis of right shoulder, unspecified osteoarthritis type    3. Preop examination    4. Abnormal blood chemistry    5. Right shoulder pain, unspecified chronicity          Plan:       Orders Placed This Encounter    X-Ray Chest PA And Lateral    CT Shoulder Without Contrast Right    CBC auto differential    Comprehensive metabolic panel    Urinalysis    Hemoglobin A1c    MRSA PCR    Diet NPO    Cleanse with Chlorhexidine (CHG)    Apply Nozin    Place RAFA hose    Place sequential compression device    EKG 12-lead    IP VTE LOW RISK PATIENT    Case Request Operating Room: ARTHROPLASTY, SHOULDER, TOTAL, REVERSE        Imaging and exam findings discussed.     Assessment & Plan  1. Right shoulder arthritis with rotator cuff tear  The right shoulder pain is attributed to arthritis, as indicated by limited motion and crepitus with movement. X-rays from 08/27/2024 show mild arthritis in the AC joint and severe arthritis in the glenohumeral joint. The MRI confirms these findings and also reveals a small tear in the rotator cuff and bursitis. Despite previous treatments, including injections and physical therapy, the primary issue remains arthritis. Muscle atrophy has developed due to disuse of the torn rotator cuff. A reverse shoulder replacement is recommended as the most suitable option, given the condition of her rotator cuff. This procedure was discussed. Post-surgery, a sling will be required for about 6 weeks, and assistance at home will be necessary during this period. The surgery is tentatively scheduled for 11/14/2024, with a preoperative visit planned for October 2024. Clearance from her cardiologist, Dr. Cervantes, will be obtained prior to the surgery. We discussed operative and nonoperative options. The patient had an  opportunity to ask questions. All questions were answered. The risks and benefits of surgery were discussed with the patient, including but not limited to bleeding, infection, damage to surrounding nerves and structures, need for further surgery, instability, stiffness, subluxation/dislocation, anesthesia risk, blood clots, and medical risks of surgery. The patient voiced understanding of the risks and benefits and provided written consent to the procedure. Plan for right reverse total shoulder arthroplasty.  Anticipated surgery and recovery course were discussed.    2. Hypertension.  She reports elevated blood pressure, likely due to pain. Monitoring and management of blood pressure will continue, and she is advised to follow up with her cardiologist for clearance before the shoulder surgery.    3. Asthma.  She is on Fasenra injections and has had only two mild asthma attacks since February. She should continue her current asthma management regimen.    4. Osteoporosis.  She has osteoporosis and is taking calcium, magnesium, D3, and zinc. She will reschedule her endocrinologist appointment to discuss recommended medication.    Follow-up  She will return in October 2024 for a preoperative visit.    All questions were answered. Patient happy and in agreement with the plan.     This note was generated with the assistance of ambient listening technology. Verbal consent was obtained by the patient and accompanying visitor(s) for the recording of patient appointment to facilitate this note. I attest to having reviewed and edited the generated note for accuracy, though some syntax or spelling errors may persist. Please contact the author of this note for any clarification.

## 2024-10-15 ENCOUNTER — TELEPHONE (OUTPATIENT)
Dept: PRIMARY CARE CLINIC | Facility: CLINIC | Age: 64
End: 2024-10-15
Payer: MEDICARE

## 2024-10-15 NOTE — TELEPHONE ENCOUNTER
----- Message from Theresa sent at 10/15/2024 11:05 AM CDT -----  Regarding: call back  .Who Called: Ivette Santillan    Caller is requesting assistance/information from provider's office.    Symptoms (please be specific):    How long has patient had these symptoms:    List of preferred pharmacies on file (remove unneeded): [unfilled]  If different, enter pharmacy into here including location and phone number:       Preferred Method of Contact: Phone Call  Patient's Preferred Phone Number on File: [phone]   Best Call Back Number, if different:  Additional Information: pt requesting a copy of test results be faxed to Dr Talha Seymour

## 2024-10-22 ENCOUNTER — INFUSION (OUTPATIENT)
Dept: INFUSION THERAPY | Facility: HOSPITAL | Age: 64
End: 2024-10-22
Attending: INTERNAL MEDICINE
Payer: MEDICARE

## 2024-10-22 VITALS
HEIGHT: 62 IN | WEIGHT: 212.19 LBS | HEART RATE: 58 BPM | OXYGEN SATURATION: 98 % | TEMPERATURE: 97 F | DIASTOLIC BLOOD PRESSURE: 77 MMHG | SYSTOLIC BLOOD PRESSURE: 133 MMHG | BODY MASS INDEX: 39.05 KG/M2

## 2024-10-22 DIAGNOSIS — J45.50 SEVERE PERSISTENT ASTHMA, UNSPECIFIED WHETHER COMPLICATED: Primary | ICD-10-CM

## 2024-10-22 PROCEDURE — 96372 THER/PROPH/DIAG INJ SC/IM: CPT

## 2024-10-22 PROCEDURE — 63600175 PHARM REV CODE 636 W HCPCS: Mod: JZ,JG | Performed by: INTERNAL MEDICINE

## 2024-10-22 RX ADMIN — BENRALIZUMAB 30 MG: 30 INJECTION, SOLUTION SUBCUTANEOUS at 08:10

## 2024-10-29 ENCOUNTER — ANESTHESIA EVENT (OUTPATIENT)
Dept: SURGERY | Facility: HOSPITAL | Age: 64
End: 2024-10-29
Payer: MEDICARE

## 2024-10-30 ENCOUNTER — OFFICE VISIT (OUTPATIENT)
Dept: ORTHOPEDICS | Facility: CLINIC | Age: 64
End: 2024-10-30
Payer: MEDICARE

## 2024-10-30 ENCOUNTER — CLINICAL SUPPORT (OUTPATIENT)
Dept: LAB | Facility: HOSPITAL | Age: 64
End: 2024-10-30
Payer: MEDICARE

## 2024-10-30 ENCOUNTER — TELEPHONE (OUTPATIENT)
Dept: ORTHOPEDICS | Facility: CLINIC | Age: 64
End: 2024-10-30

## 2024-10-30 ENCOUNTER — HOSPITAL ENCOUNTER (OUTPATIENT)
Dept: RADIOLOGY | Facility: HOSPITAL | Age: 64
Discharge: HOME | End: 2024-10-30
Payer: MEDICARE

## 2024-10-30 VITALS
HEART RATE: 65 BPM | WEIGHT: 212.31 LBS | SYSTOLIC BLOOD PRESSURE: 131 MMHG | HEIGHT: 62 IN | BODY MASS INDEX: 39.07 KG/M2 | DIASTOLIC BLOOD PRESSURE: 67 MMHG

## 2024-10-30 DIAGNOSIS — M19.011 OSTEOARTHRITIS OF RIGHT SHOULDER, UNSPECIFIED OSTEOARTHRITIS TYPE: ICD-10-CM

## 2024-10-30 DIAGNOSIS — M19.011 OSTEOARTHRITIS OF RIGHT SHOULDER, UNSPECIFIED OSTEOARTHRITIS TYPE: Primary | ICD-10-CM

## 2024-10-30 PROCEDURE — 99214 OFFICE O/P EST MOD 30 MIN: CPT | Mod: ,,, | Performed by: REHABILITATION UNIT

## 2024-10-30 PROCEDURE — 93005 ELECTROCARDIOGRAM TRACING: CPT

## 2024-10-30 PROCEDURE — 93010 ELECTROCARDIOGRAM REPORT: CPT | Mod: ,,, | Performed by: INTERNAL MEDICINE

## 2024-10-30 PROCEDURE — 71046 X-RAY EXAM CHEST 2 VIEWS: CPT | Mod: TC

## 2024-10-30 RX ORDER — MUPIROCIN 20 MG/G
OINTMENT TOPICAL DAILY
Qty: 22 G | Refills: 0 | Status: SHIPPED | OUTPATIENT
Start: 2024-11-07

## 2024-10-30 RX ORDER — METOPROLOL TARTRATE 25 MG/1
25 TABLET, FILM COATED ORAL 2 TIMES DAILY
COMMUNITY
Start: 2024-10-18

## 2024-10-30 NOTE — PROGRESS NOTES
Subjective:      Patient ID: Ivette Santillan is a 64 y.o. female.    Chief Complaint: Pre-op Exam (Preop for Rt RTSA 11/14/24. )    HPI:   Ivette Santillan is a 64 y.o. female who presents today for f/u evaluation of her right shoulder    History of Present Illness  The patient presents for evaluation of right shoulder pain.  Patient has persistent right shoulder pain and dysfunction.  This is interfering with her activities of daily living.  She is eager for operative intervention.  She does report that she has a nuclear stress test later today and an echo scheduled for next Friday.  Pain diffusely about the shoulder and worse worse with activity.    Initial HPI:  She has been experiencing persistent right shoulder pain. Despite receiving an injection from [facility], the relief was short-lived, lasting only until the following day. Her arm mobility is limited, a condition that began a few days prior to her consultation with R[facility]. In the last week of May 2024, she experienced severe pain radiating down her arm. An orthopedic specialist at [facility] diagnosed her with tendinitis and bursitis based on x-ray findings. She received an injection, which she found extremely painful. Three weeks later, she started outpatient physical therapy for a duration of six weeks, which provided some relief. She continued with home exercises and avoided overstraining her shoulder. However, she recently experienced a sudden onset of severe pain, worse than before. She is left-handed. She also reports a slight increase in blood pressure, which she attributes to the pain. She has been experiencing shoulder pain for several years and notes a difference in size between her arms, suggesting muscle loss. She wore a sling to her granddaughter's birthday party to prevent accidental bumps. She can take Aleve but not Advil.    She has a history of asthma, managed with Fasenra injections, and has had two mild asthma attacks since February  2024. She has undergone two knee replacements. She requires clearance from her cardiologist, Dr. Cervantes, who manages her blood pressure, rapid heart rate, and minor leaky valves. She has a scar from a previous lumpectomy, which was performed due to a suspected major breast cancer but turned out to be a dried hematoma.     She also experiences pain in her wrists and hands, likely due to years of playing the piano. Tylenol Arthritis and Biofreeze provide some relief for this. She has osteoporosis and has been taking a combination of calcium, magnesium, D3, and zinc, but has not yet seen the endocrinologist for medication recommendations.    Past Medical History:   Diagnosis Date    Allergic rhinitis due to pollen     Anxiety disorder, unspecified     Arthritis     Depression     GERD (gastroesophageal reflux disease)     HTN (hypertension)     Mild intermittent asthma, uncomplicated     Obesity, unspecified     Personal history of colonic polyps     Sleep apnea, unspecified      Past Surgical History:   Procedure Laterality Date    APPENDECTOMY      BREAST LUMPECTOMY      COLONOSCOPY  06/01/2017    JOINT REPLACEMENT  2018 , 2019    Knee replacement Bilateral     Social History     Socioeconomic History    Marital status:    Tobacco Use    Smoking status: Never    Smokeless tobacco: Never   Substance and Sexual Activity    Alcohol use: Never    Drug use: Never    Sexual activity: Yes     Partners: Male     Birth control/protection: Post-menopausal     Comment: With spouse only     Social Drivers of Health     Financial Resource Strain: Low Risk  (6/4/2024)    Overall Financial Resource Strain (CARDIA)     Difficulty of Paying Living Expenses: Not hard at all   Food Insecurity: No Food Insecurity (6/4/2024)    Hunger Vital Sign     Worried About Running Out of Food in the Last Year: Never true     Ran Out of Food in the Last Year: Never true   Transportation Needs: No Transportation Needs (5/13/2024)     TRANSPORTATION NEEDS     Transportation : No   Physical Activity: Insufficiently Active (6/4/2024)    Exercise Vital Sign     Days of Exercise per Week: 4 days     Minutes of Exercise per Session: 20 min   Stress: No Stress Concern Present (6/4/2024)    [employer] [place] [employer] - Occupational Stress Questionnaire     Feeling of Stress : Not at all   Housing Stability: Low Risk  (6/4/2024)    Housing Stability Vital Sign     Unable to Pay for Housing in the Last Year: No     Homeless in the Last Year: No         Current Outpatient Medications:     albuterol (PROVENTIL/VENTOLIN HFA) 90 mcg/actuation inhaler, Inhale 2 puffs into the lungs every 4 (four) hours as needed for Wheezing., Disp: 18 g, Rfl: 11    ascorbic acid, vitamin C, (VITAMIN C) 1000 MG tablet, Take 1,000 mg by mouth., Disp: , Rfl:     aspirin (ECOTRIN) 81 MG EC tablet, Take 81 mg by mouth once daily., Disp: , Rfl:     AUVI-Q 0.3 mg/0.3 mL AtIn, Inject into the muscle as needed., Disp: , Rfl:     benralizumab (FASENRA) 30 mg/mL Syrg injection, Inject 30 mg into the skin every 8 weeks., Disp: , Rfl:     dicyclomine (BENTYL) 10 MG capsule, TAKE ONE TABLET EVERY 6 HOURS. AS NEEDED FOR CRAMPS, Disp: 60 capsule, Rfl: 6    EScitalopram oxalate (LEXAPRO) 10 MG tablet, Take 1 tablet (10 mg total) by mouth once daily., Disp: 90 tablet, Rfl: 3    ezetimibe (ZETIA) 10 mg tablet, Take 10 mg by mouth once daily., Disp: , Rfl:     famotidine (PEPCID) 20 MG tablet, Take 2 tablets (40 mg total) by mouth every evening. 2 tablets at night, Disp: 180 tablet, Rfl: 3    fexofenadine (ALLEGRA) 180 MG tablet, Take 180 mg by mouth once daily., Disp: , Rfl:     fluticasone-umeclidin-vilanter (TRELEGY ELLIPTA) 200-62.5-25 mcg inhaler, INHALE ONE PUFF BY MOUTH ONCE DAILY (Patient taking differently: Inhale 1 puff into the lungs once daily.), Disp: 60 each, Rfl: 11    losartan-hydrochlorothiazide 100-25 mg (HYZAAR) 100-25 mg per tablet, Take 1 tablet by mouth once  "daily., Disp: , Rfl:     metoprolol tartrate (LOPRESSOR) 25 MG tablet, Take 25 mg by mouth 2 (two) times daily., Disp: , Rfl:     montelukast (SINGULAIR) 10 mg tablet, Take 1 tablet (10 mg total) by mouth every evening., Disp: 90 tablet, Rfl: 3    multivitamin capsule, Take 1 capsule by mouth once daily., Disp: , Rfl:     pravastatin (PRAVACHOL) 40 MG tablet, Take 40 mg by mouth every evening., Disp: , Rfl:     spironolactone (ALDACTONE) 100 MG tablet, Take 1 tablet (100 mg total) by mouth once daily., Disp: 90 tablet, Rfl: 3    azelastine-fluticasone (DYMISTA) 137-50 mcg/spray Spry nassal spray, 1 spray by Each Nostril route 2 (two) times daily., Disp: 23 g, Rfl: 11    diltiaZEM (DILACOR XR) 180 MG CDCR, Take 1 capsule (180 mg total) by mouth once daily., Disp: 30 capsule, Rfl: 11    mupirocin (BACTROBAN) 2 % ointment, by Nasal route once daily., Disp: 22 g, Rfl: 0    omeprazole (PRILOSEC) 20 MG capsule, Take 2 capsules (40 mg total) by mouth once daily., Disp: 90 capsule, Rfl: 3    Current Facility-Administered Medications:     [START ON 11/14/2024] vancomycin (VANCOCIN) 1,500 mg in D5W 500 mL IVPB, 15 mg/kg, Intravenous, Once, Jessica Kearney PA-C  Review of patient's allergies indicates:   Allergen Reactions    Amoxicillin-pot clavulanate      Other reaction(s): rash    Clindamycin      Other reaction(s): nausea, vomiting    Ibuprofen      Other reaction(s): rash    Metoclopramide      Other reaction(s): anxiety       /67   Pulse 65   Ht 5' 2" (1.575 m)   Wt 96.3 kg (212 lb 4.9 oz)   BMI 38.83 kg/m²     Comprehensive review of systems completed and negative except as per HPI.        Objective:   Head: Normocephalic, without obvious abnormality, atraumatic  Eyes: conjunctivae/corneas clear. EOM's intact  Ears: normal external appearance  Nose: Nares normal. Septum midline. Mucosa normal. No drainage  Throat: normal findings: lips normal without lesions  Lungs: unlabored breathing on room air  Chest " wall: symmetric chest rise  Heart: regular rate and rhythm  Pulses: 2+ and symmetric  Skin: Skin color, texture, turgor normal. No rashes or lesions  Neurologic: Grossly normal    right SHOULDER    Appearance:   normal    Tenderness:   Diffuse    AROM:   FF 95, Abduction 80, ER 40, IR pocket    PROM:  same    Pain:  AROM: Positive  PROM:  Positive  End ROM: Positive  Supraspinatus strength testing: Positive  External rotation strength testing: Positive  Aranza-scapular: Positive  Virtually all provacative maneuvers Positive    Strength:  Supraspinatus: reduced  External rotation: intact  Aranza-scapular: intact    Provocative Maneuvers:     Rotator Cuff/Biceps/AC Joint  Neer's Sign: Positive  Hawkin's Test: Positive  Painful arc: Positive  Belly Press: Negative  Bear Hugger Test: Negative  Hornblower's Sign: Negative  Speed's Test: Positive  Yergeson's Test: Positive  Cross Arm Abduction: Positive    Pulses: Palpable radial pulse    Neurological deficits: None    The patient has a warm and well-perfused upper extremity with capillary refill less than 2 seconds. Sensation is intact to light touch in terminal nerve distributions. 5/5 ain/pin/uln. The patient has no palpable epitrochlear lymphadenopathy.      Assessment:     Imaging:   Results  Imaging  X-rays from August 27 show AC and GH arthritis. MRI shows arthritic changes within the shoulder joint, a small tear on the top of the rotator cuff, and fluid indicating bursitis.    X-Ray Chest PA And Lateral  Narrative: EXAMINATION:  XR CHEST PA AND LATERAL    CLINICAL HISTORY:  , Primary osteoarthritis, right shoulder.    COMPARISON:  April 20, 2020    FINDINGS:  No alveolar consolidation, effusion, or pneumothorax is seen.   The thoracic aorta is normal  cardiac silhouette, central pulmonary vessels and mediastinum are normal in size and are grossly unremarkable.   visualized osseous structures are grossly unremarkable.  Impression: No acute chest disease is  identified.    Electronically signed by: Antolin San  Date:    10/30/2024  Time:    10:43            1. Osteoarthritis of right shoulder, unspecified osteoarthritis type            Plan:       Orders Placed This Encounter    vancomycin (VANCOCIN) 1,500 mg in D5W 500 mL IVPB    mupirocin (BACTROBAN) 2 % ointment          Imaging and exam findings discussed.     Assessment & Plan  1. Right shoulder arthritis with rotator cuff tear  The right shoulder pain is attributed to arthritis, as indicated by limited motion and crepitus with movement. X-rays from 08/27/2024 show mild arthritis in the AC joint and severe arthritis in the glenohumeral joint. The MRI confirms these findings and also reveals a small tear in the rotator cuff and bursitis. Despite previous treatments, including injections and physical therapy, the primary issue remains arthritis. Muscle atrophy has developed due to disuse of the torn rotator cuff. A reverse shoulder replacement is recommended as the most suitable option, given the condition of her rotator cuff. This procedure was discussed. Post-surgery, a sling will be required for about 6 weeks, and assistance at home will be necessary during this period. The surgery is tentatively scheduled for 11/14/2024.  She has some upcoming studies with her cardiologist to include a nuclear stress test and an echo.  We will keep surgery scheduled but she is going to notify us of any abnormalities and we will need clearance prior to proceeding to the operating room. We discussed operative and nonoperative options. The patient had an opportunity to ask questions. All questions were answered. The risks and benefits of surgery were discussed with the patient, including but not limited to bleeding, infection, damage to surrounding nerves and structures, need for further surgery, instability, stiffness, subluxation/dislocation, anesthesia risk, blood clots, and medical risks of surgery. The patient voiced  understanding of the risks and benefits and provided written consent to the procedure. Plan for right reverse total shoulder arthroplasty.  Anticipated surgery and recovery course were discussed.    Follow up postop    All questions were answered. Patient happy and in agreement with the plan.     This note was generated with the assistance of ambient listening technology. Verbal consent was obtained by the patient and accompanying visitor(s) for the recording of patient appointment to facilitate this note. I attest to having reviewed and edited the generated note for accuracy, though some syntax or spelling errors may persist. Please contact the author of this note for any clarification.

## 2024-10-30 NOTE — H&P (VIEW-ONLY)
Subjective:      Patient ID: Ivette Santillan is a 64 y.o. female.    Chief Complaint: Pre-op Exam (Preop for Rt RTSA 11/14/24. )    HPI:   Ivette Santillan is a 64 y.o. female who presents today for f/u evaluation of her right shoulder    History of Present Illness  The patient presents for evaluation of right shoulder pain.  Patient has persistent right shoulder pain and dysfunction.  This is interfering with her activities of daily living.  She is eager for operative intervention.  She does report that she has a nuclear stress test later today and an echo scheduled for next Friday.  Pain diffusely about the shoulder and worse worse with activity.    Initial HPI:  She has been experiencing persistent right shoulder pain. Despite receiving an injection from [facility], the relief was short-lived, lasting only until the following day. Her arm mobility is limited, a condition that began a few days prior to her consultation with Renny. In the last week of May 2024, she experienced severe pain radiating down her arm. An orthopedic specialist at [facility] diagnosed her with tendinitis and bursitis based on x-ray findings. She received an injection, which she found extremely painful. Three weeks later, she started outpatient physical therapy for a duration of six weeks, which provided some relief. She continued with home exercises and avoided overstraining her shoulder. However, she recently experienced a sudden onset of severe pain, worse than before. She is left-handed. She also reports a slight increase in blood pressure, which she attributes to the pain. She has been experiencing shoulder pain for several years and notes a difference in size between her arms, suggesting muscle loss. She wore a sling to her granddaughter's birthday party to prevent accidental bumps. She can take Aleve but not Advil.    She has a history of asthma, managed with Fasenra injections, and has had two mild asthma attacks since February  2024. She has undergone two knee replacements. She requires clearance from her cardiologist, Dr. Cervantes, who manages her blood pressure, rapid heart rate, and minor leaky valves. She has a scar from a previous lumpectomy, which was performed due to a suspected major breast cancer but turned out to be a dried hematoma.     She also experiences pain in her wrists and hands, likely due to years of playing the piano. Tylenol Arthritis and Biofreeze provide some relief for this. She has osteoporosis and has been taking a combination of calcium, magnesium, D3, and zinc, but has not yet seen the endocrinologist for medication recommendations.    Past Medical History:   Diagnosis Date    Allergic rhinitis due to pollen     Anxiety disorder, unspecified     Arthritis     Depression     GERD (gastroesophageal reflux disease)     HTN (hypertension)     Mild intermittent asthma, uncomplicated     Obesity, unspecified     Personal history of colonic polyps     Sleep apnea, unspecified      Past Surgical History:   Procedure Laterality Date    APPENDECTOMY      BREAST LUMPECTOMY      COLONOSCOPY  06/01/2017    JOINT REPLACEMENT  2018 , 2019    Knee replacement Bilateral     Social History     Socioeconomic History    Marital status:    Tobacco Use    Smoking status: Never    Smokeless tobacco: Never   Substance and Sexual Activity    Alcohol use: Never    Drug use: Never    Sexual activity: Yes     Partners: Male     Birth control/protection: Post-menopausal     Comment: With spouse only     Social Drivers of Health     Financial Resource Strain: Low Risk  (6/4/2024)    Overall Financial Resource Strain (CARDIA)     Difficulty of Paying Living Expenses: Not hard at all   Food Insecurity: No Food Insecurity (6/4/2024)    Hunger Vital Sign     Worried About Running Out of Food in the Last Year: Never true     Ran Out of Food in the Last Year: Never true   Transportation Needs: No Transportation Needs (5/13/2024)     TRANSPORTATION NEEDS     Transportation : No   Physical Activity: Insufficiently Active (6/4/2024)    Exercise Vital Sign     Days of Exercise per Week: 4 days     Minutes of Exercise per Session: 20 min   Stress: No Stress Concern Present (6/4/2024)    [employer] [place] - Occupational Stress Questionnaire     Feeling of Stress : Not at all   Housing Stability: Low Risk  (6/4/2024)    Housing Stability Vital Sign     Unable to Pay for Housing in the Last Year: No     Homeless in the Last Year: No         Current Outpatient Medications:     albuterol (PROVENTIL/VENTOLIN HFA) 90 mcg/actuation inhaler, Inhale 2 puffs into the lungs every 4 (four) hours as needed for Wheezing., Disp: 18 g, Rfl: 11    ascorbic acid, vitamin C, (VITAMIN C) 1000 MG tablet, Take 1,000 mg by mouth., Disp: , Rfl:     aspirin (ECOTRIN) 81 MG EC tablet, Take 81 mg by mouth once daily., Disp: , Rfl:     AUVI-Q 0.3 mg/0.3 mL AtIn, Inject into the muscle as needed., Disp: , Rfl:     benralizumab (FASENRA) 30 mg/mL Syrg injection, Inject 30 mg into the skin every 8 weeks., Disp: , Rfl:     dicyclomine (BENTYL) 10 MG capsule, TAKE ONE TABLET EVERY 6 HOURS. AS NEEDED FOR CRAMPS, Disp: 60 capsule, Rfl: 6    EScitalopram oxalate (LEXAPRO) 10 MG tablet, Take 1 tablet (10 mg total) by mouth once daily., Disp: 90 tablet, Rfl: 3    ezetimibe (ZETIA) 10 mg tablet, Take 10 mg by mouth once daily., Disp: , Rfl:     famotidine (PEPCID) 20 MG tablet, Take 2 tablets (40 mg total) by mouth every evening. 2 tablets at night, Disp: 180 tablet, Rfl: 3    fexofenadine (ALLEGRA) 180 MG tablet, Take 180 mg by mouth once daily., Disp: , Rfl:     fluticasone-umeclidin-vilanter (TRELEGY ELLIPTA) 200-62.5-25 mcg inhaler, INHALE ONE PUFF BY MOUTH ONCE DAILY (Patient taking differently: Inhale 1 puff into the lungs once daily.), Disp: 60 each, Rfl: 11    losartan-hydrochlorothiazide 100-25 mg (HYZAAR) 100-25 mg per tablet, Take 1 tablet by mouth once  "daily., Disp: , Rfl:     metoprolol tartrate (LOPRESSOR) 25 MG tablet, Take 25 mg by mouth 2 (two) times daily., Disp: , Rfl:     montelukast (SINGULAIR) 10 mg tablet, Take 1 tablet (10 mg total) by mouth every evening., Disp: 90 tablet, Rfl: 3    multivitamin capsule, Take 1 capsule by mouth once daily., Disp: , Rfl:     pravastatin (PRAVACHOL) 40 MG tablet, Take 40 mg by mouth every evening., Disp: , Rfl:     spironolactone (ALDACTONE) 100 MG tablet, Take 1 tablet (100 mg total) by mouth once daily., Disp: 90 tablet, Rfl: 3    azelastine-fluticasone (DYMISTA) 137-50 mcg/spray Spry nassal spray, 1 spray by Each Nostril route 2 (two) times daily., Disp: 23 g, Rfl: 11    diltiaZEM (DILACOR XR) 180 MG CDCR, Take 1 capsule (180 mg total) by mouth once daily., Disp: 30 capsule, Rfl: 11    mupirocin (BACTROBAN) 2 % ointment, by Nasal route once daily., Disp: 22 g, Rfl: 0    omeprazole (PRILOSEC) 20 MG capsule, Take 2 capsules (40 mg total) by mouth once daily., Disp: 90 capsule, Rfl: 3    Current Facility-Administered Medications:     [START ON 11/14/2024] vancomycin (VANCOCIN) 1,500 mg in D5W 500 mL IVPB, 15 mg/kg, Intravenous, Once, Jessica Kearney PA-C  Review of patient's allergies indicates:   Allergen Reactions    Amoxicillin-pot clavulanate      Other reaction(s): rash    Clindamycin      Other reaction(s): nausea, vomiting    Ibuprofen      Other reaction(s): rash    Metoclopramide      Other reaction(s): anxiety       /67   Pulse 65   Ht 5' 2" (1.575 m)   Wt 96.3 kg (212 lb 4.9 oz)   BMI 38.83 kg/m²     Comprehensive review of systems completed and negative except as per HPI.        Objective:   Head: Normocephalic, without obvious abnormality, atraumatic  Eyes: conjunctivae/corneas clear. EOM's intact  Ears: normal external appearance  Nose: Nares normal. Septum midline. Mucosa normal. No drainage  Throat: normal findings: lips normal without lesions  Lungs: unlabored breathing on room air  Chest " wall: symmetric chest rise  Heart: regular rate and rhythm  Pulses: 2+ and symmetric  Skin: Skin color, texture, turgor normal. No rashes or lesions  Neurologic: Grossly normal    right SHOULDER    Appearance:   normal    Tenderness:   Diffuse    AROM:   FF 95, Abduction 80, ER 40, IR pocket    PROM:  same    Pain:  AROM: Positive  PROM:  Positive  End ROM: Positive  Supraspinatus strength testing: Positive  External rotation strength testing: Positive  Aranza-scapular: Positive  Virtually all provacative maneuvers Positive    Strength:  Supraspinatus: reduced  External rotation: intact  Aranza-scapular: intact    Provocative Maneuvers:     Rotator Cuff/Biceps/AC Joint  Neer's Sign: Positive  Hawkin's Test: Positive  Painful arc: Positive  Belly Press: Negative  Bear Hugger Test: Negative  Hornblower's Sign: Negative  Speed's Test: Positive  Yergeson's Test: Positive  Cross Arm Abduction: Positive    Pulses: Palpable radial pulse    Neurological deficits: None    The patient has a warm and well-perfused upper extremity with capillary refill less than 2 seconds. Sensation is intact to light touch in terminal nerve distributions. 5/5 ain/pin/uln. The patient has no palpable epitrochlear lymphadenopathy.      Assessment:     Imaging:   Results  Imaging  X-rays from August 27 show AC and GH arthritis. MRI shows arthritic changes within the shoulder joint, a small tear on the top of the rotator cuff, and fluid indicating bursitis.    X-Ray Chest PA And Lateral  Narrative: EXAMINATION:  XR CHEST PA AND LATERAL    CLINICAL HISTORY:  , Primary osteoarthritis, right shoulder.    COMPARISON:  April 20, 2020    FINDINGS:  No alveolar consolidation, effusion, or pneumothorax is seen.   The thoracic aorta is normal  cardiac silhouette, central pulmonary vessels and mediastinum are normal in size and are grossly unremarkable.   visualized osseous structures are grossly unremarkable.  Impression: No acute chest disease is  identified.    Electronically signed by: Antolin San  Date:    10/30/2024  Time:    10:43            1. Osteoarthritis of right shoulder, unspecified osteoarthritis type            Plan:       Orders Placed This Encounter    vancomycin (VANCOCIN) 1,500 mg in D5W 500 mL IVPB    mupirocin (BACTROBAN) 2 % ointment          Imaging and exam findings discussed.     Assessment & Plan  1. Right shoulder arthritis with rotator cuff tear  The right shoulder pain is attributed to arthritis, as indicated by limited motion and crepitus with movement. X-rays from 08/27/2024 show mild arthritis in the AC joint and severe arthritis in the glenohumeral joint. The MRI confirms these findings and also reveals a small tear in the rotator cuff and bursitis. Despite previous treatments, including injections and physical therapy, the primary issue remains arthritis. Muscle atrophy has developed due to disuse of the torn rotator cuff. A reverse shoulder replacement is recommended as the most suitable option, given the condition of her rotator cuff. This procedure was discussed. Post-surgery, a sling will be required for about 6 weeks, and assistance at home will be necessary during this period. The surgery is tentatively scheduled for 11/14/2024.  She has some upcoming studies with her cardiologist to include a nuclear stress test and an echo.  We will keep surgery scheduled but she is going to notify us of any abnormalities and we will need clearance prior to proceeding to the operating room. We discussed operative and nonoperative options. The patient had an opportunity to ask questions. All questions were answered. The risks and benefits of surgery were discussed with the patient, including but not limited to bleeding, infection, damage to surrounding nerves and structures, need for further surgery, instability, stiffness, subluxation/dislocation, anesthesia risk, blood clots, and medical risks of surgery. The patient voiced  understanding of the risks and benefits and provided written consent to the procedure. Plan for right reverse total shoulder arthroplasty.  Anticipated surgery and recovery course were discussed.    Follow up postop    All questions were answered. Patient happy and in agreement with the plan.     This note was generated with the assistance of ambient listening technology. Verbal consent was obtained by the patient and accompanying visitor(s) for the recording of patient appointment to facilitate this note. I attest to having reviewed and edited the generated note for accuracy, though some syntax or spelling errors may persist. Please contact the author of this note for any clarification.

## 2024-10-31 LAB
OHS QRS DURATION: 96 MS
OHS QTC CALCULATION: 395 MS

## 2024-11-05 DIAGNOSIS — R12 HEARTBURN: ICD-10-CM

## 2024-11-05 RX ORDER — OMEPRAZOLE 20 MG/1
40 CAPSULE, DELAYED RELEASE ORAL DAILY
Qty: 90 CAPSULE | Refills: 3 | Status: SHIPPED | OUTPATIENT
Start: 2024-11-05

## 2024-11-13 NOTE — CLINICAL REVIEW
labs/EKG/CXR reviewed. MRSA+. JARRELL MILLER aware, per reviewers list. Awaiting cardiology notes/testing. ccv// Cardiology documents reviewed. CRA noted. crc sd

## 2024-11-14 ENCOUNTER — HOSPITAL ENCOUNTER (INPATIENT)
Facility: HOSPITAL | Age: 64
LOS: 1 days | Discharge: HOME | DRG: 483 | End: 2024-11-16
Attending: REHABILITATION UNIT | Admitting: INTERNAL MEDICINE
Payer: MEDICARE

## 2024-11-14 ENCOUNTER — ANESTHESIA (OUTPATIENT)
Dept: SURGERY | Facility: HOSPITAL | Age: 64
End: 2024-11-14
Payer: MEDICARE

## 2024-11-14 DIAGNOSIS — M19.011 OSTEOARTHRITIS OF RIGHT SHOULDER, UNSPECIFIED OSTEOARTHRITIS TYPE: ICD-10-CM

## 2024-11-14 DIAGNOSIS — M19.011 PRIMARY OSTEOARTHRITIS OF RIGHT SHOULDER: ICD-10-CM

## 2024-11-14 DIAGNOSIS — Z01.818 PRE-OP EVALUATION: ICD-10-CM

## 2024-11-14 DIAGNOSIS — E87.1 HYPONATREMIA: Primary | ICD-10-CM

## 2024-11-14 LAB — POCT GLUCOSE: 93 MG/DL (ref 70–110)

## 2024-11-14 PROCEDURE — 97166 OT EVAL MOD COMPLEX 45 MIN: CPT

## 2024-11-14 PROCEDURE — 63600175 PHARM REV CODE 636 W HCPCS: Mod: JZ,JG

## 2024-11-14 PROCEDURE — 63600175 PHARM REV CODE 636 W HCPCS: Performed by: ANESTHESIOLOGY

## 2024-11-14 PROCEDURE — 63600175 PHARM REV CODE 636 W HCPCS

## 2024-11-14 PROCEDURE — 94761 N-INVAS EAR/PLS OXIMETRY MLT: CPT

## 2024-11-14 PROCEDURE — 63600175 PHARM REV CODE 636 W HCPCS: Performed by: REHABILITATION UNIT

## 2024-11-14 PROCEDURE — 0RRJ00Z REPLACEMENT OF RIGHT SHOULDER JOINT WITH REVERSE BALL AND SOCKET SYNTHETIC SUBSTITUTE, OPEN APPROACH: ICD-10-PCS | Performed by: REHABILITATION UNIT

## 2024-11-14 PROCEDURE — C1776 JOINT DEVICE (IMPLANTABLE): HCPCS | Performed by: REHABILITATION UNIT

## 2024-11-14 PROCEDURE — 36000711: Performed by: REHABILITATION UNIT

## 2024-11-14 PROCEDURE — G0378 HOSPITAL OBSERVATION PER HR: HCPCS

## 2024-11-14 PROCEDURE — 63600175 PHARM REV CODE 636 W HCPCS: Mod: JZ,JG | Performed by: ANESTHESIOLOGY

## 2024-11-14 PROCEDURE — 37000009 HC ANESTHESIA EA ADD 15 MINS: Performed by: REHABILITATION UNIT

## 2024-11-14 PROCEDURE — 25000003 PHARM REV CODE 250: Performed by: REHABILITATION UNIT

## 2024-11-14 PROCEDURE — 99900031 HC PATIENT EDUCATION (STAT)

## 2024-11-14 PROCEDURE — 82962 GLUCOSE BLOOD TEST: CPT | Performed by: REHABILITATION UNIT

## 2024-11-14 PROCEDURE — 23472 RECONSTRUCT SHOULDER JOINT: CPT | Mod: AS,RT,,

## 2024-11-14 PROCEDURE — 36000710: Performed by: REHABILITATION UNIT

## 2024-11-14 PROCEDURE — P9047 ALBUMIN (HUMAN), 25%, 50ML: HCPCS | Mod: JZ,JG

## 2024-11-14 PROCEDURE — A4216 STERILE WATER/SALINE, 10 ML: HCPCS | Performed by: REHABILITATION UNIT

## 2024-11-14 PROCEDURE — 94799 UNLISTED PULMONARY SVC/PX: CPT

## 2024-11-14 PROCEDURE — 37000008 HC ANESTHESIA 1ST 15 MINUTES: Performed by: REHABILITATION UNIT

## 2024-11-14 PROCEDURE — 27201423 OPTIME MED/SURG SUP & DEVICES STERILE SUPPLY: Performed by: REHABILITATION UNIT

## 2024-11-14 PROCEDURE — 23472 RECONSTRUCT SHOULDER JOINT: CPT | Mod: RT,,, | Performed by: REHABILITATION UNIT

## 2024-11-14 PROCEDURE — 71000033 HC RECOVERY, INTIAL HOUR: Performed by: REHABILITATION UNIT

## 2024-11-14 PROCEDURE — 27000221 HC OXYGEN, UP TO 24 HOURS

## 2024-11-14 PROCEDURE — 25000003 PHARM REV CODE 250

## 2024-11-14 PROCEDURE — C1713 ANCHOR/SCREW BN/BN,TIS/BN: HCPCS | Performed by: REHABILITATION UNIT

## 2024-11-14 PROCEDURE — 25000003 PHARM REV CODE 250: Performed by: ANESTHESIOLOGY

## 2024-11-14 PROCEDURE — 25000003 PHARM REV CODE 250: Performed by: NURSE PRACTITIONER

## 2024-11-14 PROCEDURE — 64415 NJX AA&/STRD BRCH PLXS IMG: CPT | Performed by: ANESTHESIOLOGY

## 2024-11-14 DEVICE — IMPLANTABLE DEVICE
Type: IMPLANTABLE DEVICE | Site: SHOULDER | Status: FUNCTIONAL
Brand: COMPREHENSIVE® SHOULDER SYSTEM

## 2024-11-14 DEVICE — IMPLANTABLE DEVICE
Type: IMPLANTABLE DEVICE | Site: SHOULDER | Status: FUNCTIONAL
Brand: COMPREHENSIVE REVERSE SHOULDER

## 2024-11-14 DEVICE — IMPLANTABLE DEVICE: Type: IMPLANTABLE DEVICE | Site: SHOULDER | Status: FUNCTIONAL

## 2024-11-14 DEVICE — SCREW BONE LOCK 4.75X25MM: Type: IMPLANTABLE DEVICE | Site: SHOULDER | Status: FUNCTIONAL

## 2024-11-14 DEVICE — SCREW BONE NON LOCK 4.75X35MM: Type: IMPLANTABLE DEVICE | Site: SHOULDER | Status: FUNCTIONAL

## 2024-11-14 DEVICE — TRAY HUMERAL MINI STD SZ 0: Type: IMPLANTABLE DEVICE | Site: SHOULDER | Status: FUNCTIONAL

## 2024-11-14 DEVICE — IMPLANTABLE DEVICE
Type: IMPLANTABLE DEVICE | Site: SHOULDER | Status: FUNCTIONAL
Brand: COMPREHENSIVE® PROLONG®

## 2024-11-14 RX ORDER — CEFAZOLIN 2 G/1
2 INJECTION, POWDER, FOR SOLUTION INTRAMUSCULAR; INTRAVENOUS
Status: DISCONTINUED | OUTPATIENT
Start: 2024-11-14 | End: 2024-11-14

## 2024-11-14 RX ORDER — SODIUM CHLORIDE 0.9 % (FLUSH) 0.9 %
SYRINGE (ML) INJECTION
Status: DISPENSED
Start: 2024-11-14 | End: 2024-11-14

## 2024-11-14 RX ORDER — LOSARTAN POTASSIUM 50 MG/1
100 TABLET ORAL DAILY
Status: DISCONTINUED | OUTPATIENT
Start: 2024-11-15 | End: 2024-11-18 | Stop reason: HOSPADM

## 2024-11-14 RX ORDER — BISACODYL 10 MG/1
20 SUPPOSITORY RECTAL DAILY
Status: DISCONTINUED | OUTPATIENT
Start: 2024-11-17 | End: 2024-11-18 | Stop reason: HOSPADM

## 2024-11-14 RX ORDER — PROPOFOL 10 MG/ML
VIAL (ML) INTRAVENOUS
Status: DISCONTINUED | OUTPATIENT
Start: 2024-11-14 | End: 2024-11-14

## 2024-11-14 RX ORDER — EPINEPHRINE 0.1 MG/ML
INJECTION INTRAVENOUS
Status: DISCONTINUED | OUTPATIENT
Start: 2024-11-14 | End: 2024-11-14 | Stop reason: HOSPADM

## 2024-11-14 RX ORDER — ROPIVACAINE HYDROCHLORIDE 5 MG/ML
INJECTION, SOLUTION EPIDURAL; INFILTRATION; PERINEURAL
Status: DISPENSED
Start: 2024-11-14 | End: 2024-11-14

## 2024-11-14 RX ORDER — FENTANYL CITRATE 50 UG/ML
INJECTION, SOLUTION INTRAMUSCULAR; INTRAVENOUS
Status: DISCONTINUED | OUTPATIENT
Start: 2024-11-14 | End: 2024-11-14

## 2024-11-14 RX ORDER — MORPHINE SULFATE 10 MG/ML
INJECTION INTRAMUSCULAR; INTRAVENOUS; SUBCUTANEOUS
Status: DISCONTINUED | OUTPATIENT
Start: 2024-11-14 | End: 2024-11-14 | Stop reason: HOSPADM

## 2024-11-14 RX ORDER — SODIUM CHLORIDE 9 MG/ML
INJECTION, SOLUTION INTRAMUSCULAR; INTRAVENOUS; SUBCUTANEOUS
Status: DISCONTINUED | OUTPATIENT
Start: 2024-11-14 | End: 2024-11-14 | Stop reason: HOSPADM

## 2024-11-14 RX ORDER — DOCUSATE SODIUM 100 MG/1
200 CAPSULE, LIQUID FILLED ORAL
Status: DISCONTINUED | OUTPATIENT
Start: 2024-11-15 | End: 2024-11-18 | Stop reason: HOSPADM

## 2024-11-14 RX ORDER — HYDROMORPHONE HYDROCHLORIDE 2 MG/ML
0.2 INJECTION, SOLUTION INTRAMUSCULAR; INTRAVENOUS; SUBCUTANEOUS EVERY 5 MIN PRN
Status: DISCONTINUED | OUTPATIENT
Start: 2024-11-14 | End: 2024-11-14 | Stop reason: HOSPADM

## 2024-11-14 RX ORDER — ACETAMINOPHEN 10 MG/ML
1000 INJECTION, SOLUTION INTRAVENOUS ONCE
Status: DISCONTINUED | OUTPATIENT
Start: 2024-11-14 | End: 2024-11-14 | Stop reason: HOSPADM

## 2024-11-14 RX ORDER — SODIUM CITRATE AND CITRIC ACID MONOHYDRATE 334; 500 MG/5ML; MG/5ML
30 SOLUTION ORAL
Status: DISCONTINUED | OUTPATIENT
Start: 2024-11-14 | End: 2024-11-14 | Stop reason: HOSPADM

## 2024-11-14 RX ORDER — ROMOSOZUMAB-AQQG 105 MG/1.17ML
210 INJECTION, SOLUTION SUBCUTANEOUS
COMMUNITY

## 2024-11-14 RX ORDER — SPIRONOLACTONE 25 MG/1
50 TABLET ORAL DAILY
Status: DISCONTINUED | OUTPATIENT
Start: 2024-11-15 | End: 2024-11-15

## 2024-11-14 RX ORDER — TALC
6 POWDER (GRAM) TOPICAL NIGHTLY PRN
Status: DISCONTINUED | OUTPATIENT
Start: 2024-11-14 | End: 2024-11-18 | Stop reason: HOSPADM

## 2024-11-14 RX ORDER — KETOROLAC TROMETHAMINE 10 MG/1
10 TABLET, FILM COATED ORAL EVERY 6 HOURS
Status: DISCONTINUED | OUTPATIENT
Start: 2024-11-14 | End: 2024-11-18 | Stop reason: HOSPADM

## 2024-11-14 RX ORDER — DEXMEDETOMIDINE HYDROCHLORIDE 100 UG/ML
INJECTION, SOLUTION INTRAVENOUS
Status: DISCONTINUED | OUTPATIENT
Start: 2024-11-14 | End: 2024-11-14

## 2024-11-14 RX ORDER — LOSARTAN POTASSIUM AND HYDROCHLOROTHIAZIDE 25; 100 MG/1; MG/1
1 TABLET ORAL DAILY
Status: DISCONTINUED | OUTPATIENT
Start: 2024-11-15 | End: 2024-11-14

## 2024-11-14 RX ORDER — AMOXICILLIN 250 MG
2 CAPSULE ORAL 2 TIMES DAILY
Status: DISCONTINUED | OUTPATIENT
Start: 2024-11-14 | End: 2024-11-18 | Stop reason: HOSPADM

## 2024-11-14 RX ORDER — MEPERIDINE HYDROCHLORIDE 25 MG/ML
12.5 INJECTION INTRAMUSCULAR; INTRAVENOUS; SUBCUTANEOUS ONCE AS NEEDED
Status: DISCONTINUED | OUTPATIENT
Start: 2024-11-14 | End: 2024-11-14 | Stop reason: HOSPADM

## 2024-11-14 RX ORDER — ADHESIVE BANDAGE
30 BANDAGE TOPICAL DAILY PRN
Status: DISCONTINUED | OUTPATIENT
Start: 2024-11-14 | End: 2024-11-18 | Stop reason: HOSPADM

## 2024-11-14 RX ORDER — KETOROLAC TROMETHAMINE 10 MG/1
10 TABLET, FILM COATED ORAL
Status: COMPLETED | OUTPATIENT
Start: 2024-11-14 | End: 2024-11-14

## 2024-11-14 RX ORDER — FLUTICASONE FUROATE, UMECLIDINIUM BROMIDE AND VILANTEROL TRIFENATATE 200; 62.5; 25 UG/1; UG/1; UG/1
1 POWDER RESPIRATORY (INHALATION) DAILY
Status: ON HOLD | COMMUNITY
End: 2024-11-15 | Stop reason: SDUPTHER

## 2024-11-14 RX ORDER — GABAPENTIN 300 MG/1
300 CAPSULE ORAL NIGHTLY
Status: DISCONTINUED | OUTPATIENT
Start: 2024-11-14 | End: 2024-11-18 | Stop reason: HOSPADM

## 2024-11-14 RX ORDER — KETOROLAC TROMETHAMINE 30 MG/ML
INJECTION, SOLUTION INTRAMUSCULAR; INTRAVENOUS
Status: DISCONTINUED | OUTPATIENT
Start: 2024-11-14 | End: 2024-11-14 | Stop reason: HOSPADM

## 2024-11-14 RX ORDER — ZINC GLUCONATE 50 MG
50 TABLET ORAL DAILY
COMMUNITY

## 2024-11-14 RX ORDER — VANCOMYCIN HYDROCHLORIDE 1 G/20ML
1500 INJECTION, POWDER, LYOPHILIZED, FOR SOLUTION INTRAVENOUS
Status: COMPLETED | OUTPATIENT
Start: 2024-11-14 | End: 2024-11-14

## 2024-11-14 RX ORDER — ROPIVACAINE HYDROCHLORIDE 5 MG/ML
INJECTION, SOLUTION EPIDURAL; INFILTRATION; PERINEURAL
Status: DISCONTINUED | OUTPATIENT
Start: 2024-11-14 | End: 2024-11-14 | Stop reason: HOSPADM

## 2024-11-14 RX ORDER — HYDROCODONE BITARTRATE AND ACETAMINOPHEN 5; 325 MG/1; MG/1
1 TABLET ORAL EVERY 4 HOURS PRN
Status: DISCONTINUED | OUTPATIENT
Start: 2024-11-14 | End: 2024-11-18 | Stop reason: HOSPADM

## 2024-11-14 RX ORDER — ACETAMINOPHEN 10 MG/ML
1000 INJECTION, SOLUTION INTRAVENOUS ONCE
Status: COMPLETED | OUTPATIENT
Start: 2024-11-14 | End: 2024-11-14

## 2024-11-14 RX ORDER — ONDANSETRON HYDROCHLORIDE 2 MG/ML
INJECTION, SOLUTION INTRAVENOUS
Status: DISCONTINUED | OUTPATIENT
Start: 2024-11-14 | End: 2024-11-14

## 2024-11-14 RX ORDER — MORPHINE SULFATE 4 MG/ML
4 INJECTION, SOLUTION INTRAMUSCULAR; INTRAVENOUS
Status: ACTIVE | OUTPATIENT
Start: 2024-11-14 | End: 2024-11-16

## 2024-11-14 RX ORDER — ONDANSETRON HYDROCHLORIDE 2 MG/ML
4 INJECTION, SOLUTION INTRAVENOUS EVERY 6 HOURS PRN
Status: DISCONTINUED | OUTPATIENT
Start: 2024-11-14 | End: 2024-11-18 | Stop reason: HOSPADM

## 2024-11-14 RX ORDER — ACETAMINOPHEN 500 MG
1000 TABLET ORAL
Status: COMPLETED | OUTPATIENT
Start: 2024-11-14 | End: 2024-11-14

## 2024-11-14 RX ORDER — ESCITALOPRAM OXALATE 10 MG/1
10 TABLET ORAL DAILY
Status: DISCONTINUED | OUTPATIENT
Start: 2024-11-15 | End: 2024-11-18 | Stop reason: HOSPADM

## 2024-11-14 RX ORDER — GLYCOPYRROLATE 0.2 MG/ML
INJECTION INTRAMUSCULAR; INTRAVENOUS
Status: DISCONTINUED | OUTPATIENT
Start: 2024-11-14 | End: 2024-11-14

## 2024-11-14 RX ORDER — TRAMADOL HYDROCHLORIDE 50 MG/1
50 TABLET ORAL EVERY 4 HOURS PRN
Status: DISCONTINUED | OUTPATIENT
Start: 2024-11-14 | End: 2024-11-18 | Stop reason: HOSPADM

## 2024-11-14 RX ORDER — MORPHINE SULFATE 10 MG/ML
INJECTION INTRAMUSCULAR; INTRAVENOUS; SUBCUTANEOUS
Status: DISPENSED
Start: 2024-11-14 | End: 2024-11-14

## 2024-11-14 RX ORDER — POLYETHYLENE GLYCOL 3350 17 G/17G
17 POWDER, FOR SOLUTION ORAL NIGHTLY
Status: DISCONTINUED | OUTPATIENT
Start: 2024-11-14 | End: 2024-11-18 | Stop reason: HOSPADM

## 2024-11-14 RX ORDER — ROCURONIUM BROMIDE 10 MG/ML
INJECTION, SOLUTION INTRAVENOUS
Status: DISCONTINUED | OUTPATIENT
Start: 2024-11-14 | End: 2024-11-14

## 2024-11-14 RX ORDER — AZELASTINE 1 MG/ML
1 SPRAY, METERED NASAL 2 TIMES DAILY
COMMUNITY

## 2024-11-14 RX ORDER — LIDOCAINE HYDROCHLORIDE 10 MG/ML
1 INJECTION, SOLUTION EPIDURAL; INFILTRATION; INTRACAUDAL; PERINEURAL ONCE
Status: COMPLETED | OUTPATIENT
Start: 2024-11-14 | End: 2024-11-14

## 2024-11-14 RX ORDER — GABAPENTIN 300 MG/1
300 CAPSULE ORAL
Status: COMPLETED | OUTPATIENT
Start: 2024-11-14 | End: 2024-11-14

## 2024-11-14 RX ORDER — BUPIVACAINE HYDROCHLORIDE 2.5 MG/ML
INJECTION, SOLUTION EPIDURAL; INFILTRATION; INTRACAUDAL
Status: COMPLETED
Start: 2024-11-14 | End: 2024-11-14

## 2024-11-14 RX ORDER — GABAPENTIN 300 MG/1
300 CAPSULE ORAL
Status: DISCONTINUED | OUTPATIENT
Start: 2024-11-14 | End: 2024-11-14 | Stop reason: SDUPTHER

## 2024-11-14 RX ORDER — CEFAZOLIN SODIUM 1 G/3ML
2 INJECTION, POWDER, FOR SOLUTION INTRAMUSCULAR; INTRAVENOUS
Status: COMPLETED | OUTPATIENT
Start: 2024-11-14 | End: 2024-11-14

## 2024-11-14 RX ORDER — CEFAZOLIN 2 G/1
2 INJECTION, POWDER, FOR SOLUTION INTRAMUSCULAR; INTRAVENOUS
Status: COMPLETED | OUTPATIENT
Start: 2024-11-14 | End: 2024-11-15

## 2024-11-14 RX ORDER — ALBUMIN HUMAN 250 G/1000ML
SOLUTION INTRAVENOUS
Status: COMPLETED
Start: 2024-11-14 | End: 2024-11-14

## 2024-11-14 RX ORDER — ALUMINUM HYDROXIDE, MAGNESIUM HYDROXIDE, AND SIMETHICONE 1200; 120; 1200 MG/30ML; MG/30ML; MG/30ML
30 SUSPENSION ORAL EVERY 6 HOURS PRN
Status: DISCONTINUED | OUTPATIENT
Start: 2024-11-14 | End: 2024-11-18 | Stop reason: HOSPADM

## 2024-11-14 RX ORDER — SODIUM CHLORIDE, SODIUM GLUCONATE, SODIUM ACETATE, POTASSIUM CHLORIDE AND MAGNESIUM CHLORIDE 30; 37; 368; 526; 502 MG/100ML; MG/100ML; MG/100ML; MG/100ML; MG/100ML
INJECTION, SOLUTION INTRAVENOUS CONTINUOUS
Status: DISCONTINUED | OUTPATIENT
Start: 2024-11-14 | End: 2024-11-14

## 2024-11-14 RX ORDER — BUPIVACAINE 13.3 MG/ML
INJECTION, SUSPENSION, LIPOSOMAL INFILTRATION
Status: DISPENSED
Start: 2024-11-14 | End: 2024-11-14

## 2024-11-14 RX ORDER — NAPROXEN SODIUM 220 MG/1
81 TABLET, FILM COATED ORAL DAILY
Status: DISCONTINUED | OUTPATIENT
Start: 2024-11-14 | End: 2024-11-18 | Stop reason: HOSPADM

## 2024-11-14 RX ORDER — SCOLOPAMINE TRANSDERMAL SYSTEM 1 MG/1
1 PATCH, EXTENDED RELEASE TRANSDERMAL ONCE AS NEEDED
Status: DISCONTINUED | OUTPATIENT
Start: 2024-11-14 | End: 2024-11-14 | Stop reason: HOSPADM

## 2024-11-14 RX ORDER — HYDROCHLOROTHIAZIDE 25 MG/1
25 TABLET ORAL DAILY
Status: DISCONTINUED | OUTPATIENT
Start: 2024-11-15 | End: 2024-11-15

## 2024-11-14 RX ORDER — DEXAMETHASONE SODIUM PHOSPHATE 4 MG/ML
INJECTION, SOLUTION INTRA-ARTICULAR; INTRALESIONAL; INTRAMUSCULAR; INTRAVENOUS; SOFT TISSUE
Status: DISCONTINUED | OUTPATIENT
Start: 2024-11-14 | End: 2024-11-14

## 2024-11-14 RX ORDER — EPINEPHRINE 1 MG/ML
INJECTION, SOLUTION, CONCENTRATE INTRAVENOUS
Status: DISPENSED
Start: 2024-11-14 | End: 2024-11-14

## 2024-11-14 RX ORDER — MIDAZOLAM HYDROCHLORIDE 2 MG/2ML
2 INJECTION, SOLUTION INTRAMUSCULAR; INTRAVENOUS ONCE AS NEEDED
Status: DISCONTINUED | OUTPATIENT
Start: 2024-11-14 | End: 2024-11-14 | Stop reason: HOSPADM

## 2024-11-14 RX ORDER — ALBUTEROL SULFATE 90 UG/1
2 INHALANT RESPIRATORY (INHALATION) EVERY 4 HOURS PRN
Status: DISCONTINUED | OUTPATIENT
Start: 2024-11-14 | End: 2024-11-18 | Stop reason: HOSPADM

## 2024-11-14 RX ORDER — ONDANSETRON 4 MG/1
4 TABLET, ORALLY DISINTEGRATING ORAL EVERY 6 HOURS PRN
Status: DISCONTINUED | OUTPATIENT
Start: 2024-11-14 | End: 2024-11-14 | Stop reason: HOSPADM

## 2024-11-14 RX ORDER — EPHEDRINE SULFATE 50 MG/ML
INJECTION, SOLUTION INTRAVENOUS
Status: DISCONTINUED | OUTPATIENT
Start: 2024-11-14 | End: 2024-11-14

## 2024-11-14 RX ORDER — BUPIVACAINE HYDROCHLORIDE 2.5 MG/ML
INJECTION, SOLUTION EPIDURAL; INFILTRATION; INTRACAUDAL
Status: COMPLETED | OUTPATIENT
Start: 2024-11-14 | End: 2024-11-14

## 2024-11-14 RX ORDER — METOPROLOL TARTRATE 25 MG/1
25 TABLET, FILM COATED ORAL 2 TIMES DAILY
Status: DISCONTINUED | OUTPATIENT
Start: 2024-11-14 | End: 2024-11-18 | Stop reason: HOSPADM

## 2024-11-14 RX ORDER — METHOCARBAMOL 750 MG/1
750 TABLET, FILM COATED ORAL EVERY 8 HOURS PRN
Status: DISCONTINUED | OUTPATIENT
Start: 2024-11-14 | End: 2024-11-18 | Stop reason: HOSPADM

## 2024-11-14 RX ORDER — SODIUM CHLORIDE 9 MG/ML
INJECTION, SOLUTION INTRAVENOUS CONTINUOUS
Status: DISCONTINUED | OUTPATIENT
Start: 2024-11-14 | End: 2024-11-16

## 2024-11-14 RX ORDER — SODIUM CHLORIDE, SODIUM LACTATE, POTASSIUM CHLORIDE, CALCIUM CHLORIDE 600; 310; 30; 20 MG/100ML; MG/100ML; MG/100ML; MG/100ML
INJECTION, SOLUTION INTRAVENOUS CONTINUOUS
Status: CANCELLED | OUTPATIENT
Start: 2024-11-14

## 2024-11-14 RX ORDER — FLUTICASONE PROPIONATE 50 MCG
1 SPRAY, SUSPENSION (ML) NASAL 2 TIMES DAILY
COMMUNITY

## 2024-11-14 RX ORDER — KETOROLAC TROMETHAMINE 30 MG/ML
INJECTION, SOLUTION INTRAMUSCULAR; INTRAVENOUS
Status: DISPENSED
Start: 2024-11-14 | End: 2024-11-14

## 2024-11-14 RX ORDER — METOCLOPRAMIDE HYDROCHLORIDE 5 MG/ML
10 INJECTION INTRAMUSCULAR; INTRAVENOUS
Status: DISCONTINUED | OUTPATIENT
Start: 2024-11-14 | End: 2024-11-15

## 2024-11-14 RX ORDER — MIDAZOLAM HYDROCHLORIDE 1 MG/ML
2 INJECTION INTRAMUSCULAR; INTRAVENOUS EVERY 5 MIN PRN
Status: DISCONTINUED | OUTPATIENT
Start: 2024-11-14 | End: 2024-11-14 | Stop reason: HOSPADM

## 2024-11-14 RX ORDER — TRANEXAMIC ACID 650 MG/1
1500 TABLET ORAL
Status: COMPLETED | OUTPATIENT
Start: 2024-11-14 | End: 2024-11-14

## 2024-11-14 RX ORDER — ALBUMIN HUMAN 250 G/1000ML
SOLUTION INTRAVENOUS
Status: DISCONTINUED | OUTPATIENT
Start: 2024-11-14 | End: 2024-11-14

## 2024-11-14 RX ADMIN — VANCOMYCIN HYDROCHLORIDE 1000 MG: 1 INJECTION, POWDER, LYOPHILIZED, FOR SOLUTION INTRAVENOUS at 08:11

## 2024-11-14 RX ADMIN — SUGAMMADEX 200 MG: 100 INJECTION, SOLUTION INTRAVENOUS at 02:11

## 2024-11-14 RX ADMIN — DEXAMETHASONE SODIUM PHOSPHATE 8 MG: 4 INJECTION, SOLUTION INTRA-ARTICULAR; INTRALESIONAL; INTRAMUSCULAR; INTRAVENOUS; SOFT TISSUE at 12:11

## 2024-11-14 RX ADMIN — DEXMEDETOMIDINE HYDROCHLORIDE 4 MCG: 100 INJECTION, SOLUTION INTRAVENOUS at 12:11

## 2024-11-14 RX ADMIN — SENNOSIDES AND DOCUSATE SODIUM 2 TABLET: 50; 8.6 TABLET ORAL at 09:11

## 2024-11-14 RX ADMIN — FENTANYL CITRATE 50 MCG: 50 INJECTION, SOLUTION INTRAMUSCULAR; INTRAVENOUS at 12:11

## 2024-11-14 RX ADMIN — VANCOMYCIN HYDROCHLORIDE 1500 MG: 1.5 INJECTION, POWDER, LYOPHILIZED, FOR SOLUTION INTRAVENOUS at 09:11

## 2024-11-14 RX ADMIN — POLYETHYLENE GLYCOL 3350 17 G: 17 POWDER, FOR SOLUTION ORAL at 09:11

## 2024-11-14 RX ADMIN — CEFAZOLIN 2 G: 330 INJECTION, POWDER, FOR SOLUTION INTRAMUSCULAR; INTRAVENOUS at 12:11

## 2024-11-14 RX ADMIN — FENTANYL CITRATE 25 MCG: 50 INJECTION, SOLUTION INTRAMUSCULAR; INTRAVENOUS at 02:11

## 2024-11-14 RX ADMIN — GABAPENTIN 300 MG: 300 CAPSULE ORAL at 08:11

## 2024-11-14 RX ADMIN — SODIUM CITRATE AND CITRIC ACID MONOHYDRATE 30 ML: 500; 334 SOLUTION ORAL at 08:11

## 2024-11-14 RX ADMIN — FENTANYL CITRATE 50 MCG: 50 INJECTION, SOLUTION INTRAMUSCULAR; INTRAVENOUS at 11:11

## 2024-11-14 RX ADMIN — ACETAMINOPHEN 1000 MG: 10 INJECTION INTRAVENOUS at 06:11

## 2024-11-14 RX ADMIN — ACETAMINOPHEN 1000 MG: 500 TABLET ORAL at 08:11

## 2024-11-14 RX ADMIN — ONDANSETRON HYDROCHLORIDE 4 MG: 2 SOLUTION INTRAMUSCULAR; INTRAVENOUS at 01:11

## 2024-11-14 RX ADMIN — TRANEXAMIC ACID 1300 MG: 650 TABLET ORAL at 08:11

## 2024-11-14 RX ADMIN — ROCURONIUM BROMIDE 20 MG: 10 SOLUTION INTRAVENOUS at 12:11

## 2024-11-14 RX ADMIN — CEFAZOLIN 2 G: 2 INJECTION, POWDER, FOR SOLUTION INTRAMUSCULAR; INTRAVENOUS at 06:11

## 2024-11-14 RX ADMIN — METOPROLOL TARTRATE 25 MG: 25 TABLET, FILM COATED ORAL at 09:11

## 2024-11-14 RX ADMIN — EPHEDRINE SULFATE 50 MG: 50 INJECTION INTRAVENOUS at 12:11

## 2024-11-14 RX ADMIN — GABAPENTIN 300 MG: 300 CAPSULE ORAL at 09:11

## 2024-11-14 RX ADMIN — KETOROLAC TROMETHAMINE 10 MG: 10 TABLET, FILM COATED ORAL at 06:11

## 2024-11-14 RX ADMIN — PROPOFOL 150 MG: 10 INJECTION, EMULSION INTRAVENOUS at 11:11

## 2024-11-14 RX ADMIN — KETOROLAC TROMETHAMINE 10 MG: 10 TABLET, FILM COATED ORAL at 08:11

## 2024-11-14 RX ADMIN — ROCURONIUM BROMIDE 60 MG: 10 SOLUTION INTRAVENOUS at 11:11

## 2024-11-14 RX ADMIN — GLYCOPYRROLATE 0.2 MG: 0.2 INJECTION INTRAMUSCULAR; INTRAVENOUS at 11:11

## 2024-11-14 RX ADMIN — ALBUMIN (HUMAN) 100 ML: 5 SOLUTION INTRAVENOUS at 12:11

## 2024-11-14 RX ADMIN — LIDOCAINE HYDROCHLORIDE 5 ML: 10 INJECTION, SOLUTION EPIDURAL; INFILTRATION; INTRACAUDAL; PERINEURAL at 11:11

## 2024-11-14 RX ADMIN — FENTANYL CITRATE 25 MCG: 50 INJECTION, SOLUTION INTRAMUSCULAR; INTRAVENOUS at 01:11

## 2024-11-14 RX ADMIN — ROCURONIUM BROMIDE 20 MG: 10 SOLUTION INTRAVENOUS at 01:11

## 2024-11-14 RX ADMIN — BUPIVACAINE HYDROCHLORIDE 20 ML: 2.5 INJECTION, SOLUTION EPIDURAL; INFILTRATION; INTRACAUDAL; PERINEURAL at 11:11

## 2024-11-14 RX ADMIN — MIDAZOLAM HYDROCHLORIDE 2 MG: 1 INJECTION, SOLUTION INTRAMUSCULAR; INTRAVENOUS at 11:11

## 2024-11-14 RX ADMIN — DEXMEDETOMIDINE HYDROCHLORIDE 4 MCG: 100 INJECTION, SOLUTION INTRAVENOUS at 01:11

## 2024-11-14 NOTE — DISCHARGE INSTRUCTIONS
[facility]  [address]  [place] [address]  Phone [phone]       /      Fax [phone]  SURGEON: Dr. Enriquez    After discharge, all questions or concerns should be handled at your surgeon's office ([phone]). If it is a weekend or after hours, you will get the surgeon on call.     Discharge Medications:    PAIN MANAGEMENT: Next Dose Available   Toradol/Ketorolac 10mg (Anti-inflammatory) - take every 8 hours, around the clock, for the next 5 days 10pm   Robaxin/Methocarbamol 750mg (Muscle Relaxer) - Every 6-8 hours AS NEEDED for muscle spasms, thigh pain or additional pain control If needed   Norco 5/325mg (Hydrocodone/Acetaminophen) (Pain Med) - every 4-6 hours AS NEEDED for pain If needed   COMPLICATION PREVENTION MEDS: Next Dose DUE   Aspirin 81mg twice a day for 4 weeks post-op for blood clot prevention PM on 11/6/2024   Miralax 17gm or Senokot S/Aranza-Colace 8.6/50mg 2 tablets - once or twice a day while on narcotics and muscle relaxers for constipation prevention PM on 11/16/2024   NOZIN NASAL  - twice a day for 2 weeks or until supply runs out, whichever comes first (Infection prevention) PM on 11/16/2024   Take a medication once or twice a day while taking TORADOL and Aspirin at the same time to prevent heartburn PM on 11/16/2024   Sodium Chloride 2gm - twice a day for the next 3 days PM on 11/16/2024   Duricef/Cefadroxil (antibiotic) twice a day for 7 days PM on 11/16/24       SHOULDER REPLACEMENT      PAIN CONTROL & PAIN MEDICATION:(Use the medication log in your discharge packet to keep track of your medications)  Pain medicine can cause nausea and vomiting, especially on an empty stomach. Ice and elevation are more useful than pain medicine for surgical pain.  If you are having too much pain or discomfort, try more ice and higher elevation.  Please Do NOT drive a vehicle or use heavy machinery while taking pain medication. Do NOT drink  alcohol while taking pain medication  Decrease the use of narcotics as the pain lessens.     Toradol/Ketorolac 10mg (anti-inflammatory) - take every 8 hours around the clock for the next 5 days.   While on Toradol/Ketorolac and Aspirin (blood thinner) at the same time, take a medication once or twice a day to protect your stomach (for the next 5 days) (Examples: Nexium, Prilosec, Prevacid, Omeprazole, Pepcid...etc). If you start having intolerable stomach issues, discontinue the Toradol completely.   Aside from Toradol, No other anti-inflammatories (Ibuprofen, Aleve, Motrin, Naproxen, Mobic/Meloxicam, Celebrex, Diclofenac/Voltaren...etc) for 2 weeks or until the wound is healed.      Norco 5/325mg (Hydrocodone/Acetaminophen) (pain pill) - You can take 1 tablet every 4-6 hours for pain. If the pain is mild, take 1/2 of a pill. Once you start taking a half of a pain pill, you can take a Tylenol 325mg with each dose for a little extra pain relief without side effects. Gradually decrease the use as the pain lessens. As you decrease the Norco, increase the Tylenol.    **NO MORE THAN 3000mg OF TYLENOL IN 24 HOURS**.     Robaxin/Methocarbamol 750mg (muscle relaxer)- you can take every 6-8 hours as needed for muscle spasms, thigh pain and stiffness, additional pain control or breakthrough pain medications. This medication is helpful for pain control while lessening your need for narcotics. Please reduce the use gradually as the pain and spasms lessen. DO NOT TAKE AT THE SAME TIME AS A PAIN PILL. YOU WILL BE BETTER SERVED WITH 2 HOURS BETWEEN PAIN PILL AND MUSCLE RELAXER.     **Other things that help with pain control is COMPRESSION WRAP, ICE and ELEVATION!!**    WOUND CARE DRESSING:  Keep the dressing clean and dry. Leave grey bandage ON until change date 11/21/2024.   Then replace it with another Mepilex (grey bandage) given to you at discharge.   No ointments, creams, lotions or antiseptics on the incision. Do not remove  the glue covering your wound.     SHOWERING:  You may shower 5 days after surgery as long as you do NOT wet the wound/dressing. It is not uncommon to have drainage a few days after surgery.  Keep the wound covered until you follow-up with your surgeon.     DIET:  Have a regular, well balanced diet, as tolerated. Add some protein and iron to your diet for wound healing purposes. Take a multivitamin with iron for 1 month post-op.    ACTIVITY:  Sleep as upright as possible using extra pillows as needed.  A recliner may also be helpful to sleep upright.  Do this for the first few days after surgery to help decrease pain and swelling.    Apply Ice packs as much as possible.   Ice packs should be applied to the shoulder for 30 minutes, 5-6 times per day, for the 1st week to help decrease pain and swelling.  After the first week, apply as needed, especially after exercises and physical therapy.    Wear the sling at all times including while sleeping.  The only time you may remove the sling is for showers and to perform home exercise regimen or therapy exercises.      PHYSICAL THERAPY: After post op appointment  Continue Pendulum range of motion exercises and movement of the elbow, wrist and fingers as instructed by occupational therapy.  Start therapy as soon as possible. All exercises and activities must be within the protocol until rehab is complete. Some patients will not start outpatient physical therapy until after their first follow-up appointment.    BLOOD CLOT PREVENTION:  If you are aware that you are at high risk for blood clots, notify your physician.  In general, you should walk as much as possible, at least every 1-2 hours, after surgery to increase blood circulation throughout your body. Most patients will take Aspirin 81 mg twice a day, morning and night for the next 4 weeks. Start on the evening of 11/15/2024. Stop on 12/15/2024. If you were taking a baby aspirin prior to surgery, you can restart this after  1 month (12/16/2024).      You need to continuing wearing your compression stocking (RAFA Hose - ThromboEmbolic Disease Prevention Device) for the next 2 weeks. It is ok to remove them for hygiene and at bedtime. Walking is the best thing you can do to prevent blood clots.     CONSTIPATION PREVENTION:   Use Miralax or Senokot S/Aranza-Colace and Stool softeners every day while on pain meds. If you do not begin having regular bowel movements once you get home, start taking more aggressive over the counter LAXATIVES as needed for constipation. Some examples of over the counter laxatives would be: Milk of Magnesia, Dulcolax Suppository/Tablets, Magnesium citrate, Fleet's enema...etc).   Other things that help with preventing constipation is to drink lots of water, increase fiber in your diet and increase your walking distance each day.    URINARY RETENTION:  If you start having difficulty with urinating, discontinue Pain pills and muscle relaxers as much as possible and call your primary care doctor.     FALL PREVENTION:  Wear sturdy shoes that fit well - Wearing shoes with high heels or slippery soles, or shoes that are too loose, can lead to falls. Walking around in bare feet, or only socks, can also increase your risk of falling.  Use good lighting and  throw rugs, electrical cords, furniture and clutter (anything than can cause you to trip at home.   Non-slip rug in bathroom or shower    PNEUMONIA PREVENTION:  To prevent pneumonia, you should stay out of bed as much as possible. Continue breathing exercises (Incentive spirometry) every 1-2 hours while you are on pain medication and not as mobile.     INFECTION PREVENTION:  NOZIN NASAL  - twice a day for 2 weeks or until the supply runs out, whichever comes first. Shake bottle well, saturate cotton swab with 4 drops of antiseptic solution. Swab right nostril rim 6-8 times clockwise and counterclockwise. Take swab out, apply 2 more drops then swab left  nostril rim 6-8 times clockwise and counterclockwise.   No smoking or Tobacco products  No pets in the bed or around the wound/dressing   Pre-medicate before dental/surgical procedures and address UTIs or other infectious processes in the body as soon as possible.   Use gloves and use good hand washing before and after dressing changes. Please notify YOUR SURGEON of excessive wound drainage.   Please avoid wetting the wound, if possible.   If you are diabetic, continue monitoring blood sugars. If you continue to see high numbers, call your primary care doctor. Maintain tight blood sugar control (Below 150's) as you go through your recovery.     DRIVING OR FLYING:  In general, you should NOT drive while wearing a sling.  You must NEVER drive while taking narcotic pain medication.        PROBLEMS TO REPORT:  Unusual redness, swelling, excessive,  cloudy or foul smelling drainage at the incision site.   Persistent low grade temp or a temp greater than 102 F, unrelieved by Tylenol  Pain at surgical site, unrelieved by pain meds      Warning signs of a blood clot in your leg: (CALL YOUR DOCTOR)  New Onset or Increasing pain in calf, tenderness or redness above or below the knee or increasing swelling of your calf, ankle, or foot.  Warning signs that a blood clot has traveled to your lungs: (REPORT TO THE ER)  Sudden or increase in Shortness of breath, sudden onset of chest pains, or  Localized chest pain with coughing.     RETURN APPOINTMENT:  To confirm or reschedule your appointment, call [phone].    IF YOU NEED TO CALL YOUR PRIMARY CARE DOCTOR FOR ANY ISSUES DURING YOUR RECOVERY, PLEASE NOTIFY YOUR ORTHOPEDIC SURGEON AS WELL.   For emergencies, please report to our ([facility] or [facility] [place]) Emergency department and tell them to call Dr. Enriquez at [phone].     FOR ANY ISSUES AT THE FACILITY, PLEASE CALL THE SURGEON FIRST.   Before making changes to the medical care plan or sending the patient to the  emergency room, please call Dr. Enriquez.

## 2024-11-14 NOTE — PLAN OF CARE
Problem: Occupational Therapy  Goal: Occupational Therapy Goal  Description: Pt will perform UB dress c Touch assist using hemitechniques by d/c.   Pt will perform LB dress c Touch assist using hemitechniques by d/c.  Pt will perform toilet t/f using LRAD c SBA assist by d/c.  Pt will perform toileting c SBA assist by d/c.   Pt will perform Walk in shower t/f c standby assist by d/c.   Pt will be Ind c HEP and pxns Compliance by d/c.    Outcome: Progressing

## 2024-11-14 NOTE — INTERVAL H&P NOTE
The patient has been examined and the H&P has been reviewed:    I concur with the findings and no changes have occurred since H&P was written.    Surgery risks, benefits and alternative options discussed and understood by patient/family.    Active Hospital Problems    Diagnosis  POA    Osteoarthritis of right shoulder [M19.011]  Yes      Resolved Hospital Problems   No resolved problems to display.

## 2024-11-14 NOTE — OP NOTE
Operative Report      Date: 2024                          Location: [place] [address]    Name: Ivette Santillan  : 1960  MRN: [id number]    Diagnoses:    Pre-Op Diagnosis Codes:      * Osteoarthritis of right shoulder, unspecified osteoarthritis type [M19.011]    Post-Op Diagnosis Codes:     * Osteoarthritis of right shoulder, unspecified osteoarthritis type [M19.011]    Procedure(s):  Right reverse total shoulder arthroplasty and biceps tenodesis     Attending Surgeon(s):  Jarrell Enriquez MD    Assistant: CHARLEE Katz PA-C was essential for manipulation of the extremity, retraction, and closure.     Anesthesia: General endotracheal anesthesia and regional nerve block.          ASA: III    Estimated Blood Loss: 30 cc    Specimen: right humeral head    Implants:  Implant Name Type Inv. Item Serial No.  Lot No. LRB No. Used Action   PIN FIXATION REV SHOULDER 9 - [id number]  PIN FIXATION REV SHOULDER 9  [employer] [id number] Right 1 Implanted and Explanted   BASEPLATE GLENOID RVS 25MM - [id number]  BASEPLATE GLENOID RVS 25MM  [employer] [id number] Right 1 Implanted   COMP JIMMY REV SHOULDER 36MM - [id number]  COMP JIMMY REV SHOULDER 36MM  [employer] [id number] Right 1 Implanted   STEM COMPHSVE HUM ELIZABETH 42L39VS - [id number]  STEM COMPHSVE HUM ELIZABETH [id number]  [employer] [id number] Right 1 Implanted   TRAY HUMERAL MINI STD SZ 0 - [id number]  TRAY HUMERAL MINI STD SZ 0  [employer] [id number] Right 1 Implanted   comprehensive reverse shoulder system prlong highly crosslinked polyethylene bearing 3mm thickness 36mm diameter    [employer] [id number] Right 1 Implanted   SCREW BNE COMPR SH 4.3 X 50 MM - [id number]  SCREW BNE COMPR SH 4.3 X 50 MM  [employer]  Right 1 Implanted   SCREW BONE LOCK 4.23A52NK - [id number]  SCREW BONE LOCK 4.85C24OP  [employer]  Right 2 Implanted   SCREW BONE NON LOCK 4.30O93FP - [id number]  SCREW BONE NON LOCK 4.80I67DS  [employer]  Right 1  Implanted     Glenoid 25mm mini baseplate with 40 mm 6.5mm central screw and three periperal 4.75mm locking screws  Glenosphere 36mm with 1.5 inferior offset  Stem [id number]  36, +3 poly with standard tray      Complications: None.     Indications:   Ivette Santillan is an 64 y.o. female who is having surgery for right shoulder pain and dysfunction. This was symptomatic and refractory to nonoperative treatment. The risks and benefits of the operation were discussed including, but not limited to:  Bleeding, infection, damage to surrounding nerves and structure, repair failure, need for additional procedures, need for revision surgery, need for explant, continued pain, stiffness, instability, dislocation, blood clots, and medical risks of anesthesia.  Written informed surgical consent was obtained.     Narrative:  The correct patient was identified in the preoperative holding area and the operative extremity marked and procedure confirmed with the patient.  Patient was then taken to the operating room and placed standard bed with a beach chair attachment. The patient was induced under general anesthesia. Next the patient was placed upright into the beachchair position with care taken to ensure the cervical spine was well positioned and the face, eyes and all bony prominences well protected. Preoperative antibiotics were administered. The right upper extremity was then prepped and draped in the usual sterile fashion and secured to an arm pettit. Prior to commencement of the procedure, a universal precautions time-out was performed identifying the correct patient, procedure, operative extremity, and administration of antibiotics. She received both ancef and vanc.      An incision approximately 10 cm in length was made using the deltopectoral interval and carried down using electrocautery and blunt dissection to identify the cephalic vein.  The cephalic vein was taken laterally and bridging veins coagulated. The clavipectoral  fascia was incised. I identified the biceps tendon as it crossed underneath the pectoralis. I released the upper portion of the pectoralis to gain access to the tendon. I then completed a biceps tenodesis by suturing the biceps with #2 fiberwire to the adjacent soft tissue. I then traced the biceps tendon superiorly through the rotator interval and into the glenohumeral joint. Using electrocautery, a  subscapularis peel was performed and then this was mobilized and released from the rotator interval and the anterior capsule was released from the tendon. The subscapularis was tagged. The capsule was released from the humerus using electrocautery while protecting the axillary nerve. I was then able to dislocate the glenohumeral joint anteriorly. The humeral head was flattened and arthritic.  I trimmed surrounding osteophytes. I then used a sagittal saw in order to perform a free hand cut of the articular surface while using an external jig.  I then used a canal finder to find the medullary canal of the proximal humerus.  I sequentially reamed and broached until I found a secure fit.  I then placed a protector over the cut surface and retracted the humerus posteriorly. Attention was then turned to performing the releases about the glenoid.     Assorted retractors were then placed and the glenoid exposed.  Appropriate releases were completed. Hypertrophic labrum was excised around the glenoid. Using a guided system, I placed a guidewire center-center into the glenoid.  I then step reamed to denude the glenoid of cartilage and prepared a fresh bony bed for the baseplate.  I impacted the baseplate in place.  I drilled and secured the baseplate with locking screws. Superior, inferior, and posterior screws were placed. Happy with the stability, I cleaned the baseplate of all debris and dried it appropriately.  I then impacted the glenosphere onto the baseplate.  I was happy with stability and turned my attention back to the  humerus.     I trialed a tray and poly. I was happy with the trial sizes which provided appropriate stability and motion with no impingement. I then removed the trial tray, poly, and stem. Pulse lavage irrigation was performed. I then impacted my final stem, tray, and poly. The shoulder was reduced. I was again pleased with motion and stability.      I then copiously irrigated the wound with pulse irrigation.  I also used Irrisept to irrigate and again normal saline. I then closed the deltopectoral interval with a permanent #2 suture.  I closed the subcutaneous layer with a 2-0 Monocryl suture followed by 3-0 Monocryl. Sterile dressings were applied. The patient was then placed in an abduction pillow and sling, awoken from general anesthetic, and taken to recovery room in a stable condition. Counts were correct x2.     Postoperative Plan:  The patient will be admitted and remain non-weightbearing to right upper extremity. Pain control. Mobilize with therapy. Postop abx. Patient will be maintained in the sling.  The patient will followup in the office in two weeks for wound check and XR.

## 2024-11-14 NOTE — PLAN OF CARE
Problem: Adult Inpatient Plan of Care  Goal: Plan of Care Review  Outcome: Progressing  Goal: Patient-Specific Goal (Individualized)  Outcome: Progressing  Goal: Absence of Hospital-Acquired Illness or Injury  Outcome: Progressing  Goal: Optimal Comfort and Wellbeing  Outcome: Progressing  Goal: Readiness for Transition of Care  Outcome: Progressing     Problem: Wound  Goal: Optimal Coping  Outcome: Progressing  Goal: Optimal Functional Ability  Outcome: Progressing  Goal: Absence of Infection Signs and Symptoms  Outcome: Progressing  Goal: Improved Oral Intake  Outcome: Progressing  Goal: Optimal Pain Control and Function  Outcome: Progressing  Goal: Skin Health and Integrity  Outcome: Progressing  Goal: Optimal Wound Healing  Outcome: Progressing     Problem: Infection  Goal: Absence of Infection Signs and Symptoms  Outcome: Progressing     Problem: Shoulder Arthroplasty (Total, Bright, Reverse)  Goal: Optimal Coping  Outcome: Progressing  Goal: Absence of Bleeding  Outcome: Progressing  Goal: Effective Bowel Elimination  Outcome: Progressing  Goal: Fluid and Electrolyte Balance  Outcome: Progressing  Goal: Optimal Functional Ability  Outcome: Progressing  Goal: Absence of Infection Signs and Symptoms  Outcome: Progressing  Goal: Intact Neurovascular Status  Outcome: Progressing  Goal: Anesthesia/Sedation Recovery  Outcome: Progressing  Goal: Acceptable Pain Control  Outcome: Progressing  Goal: Nausea and Vomiting Relief  Outcome: Progressing  Goal: Effective Urinary Elimination  Outcome: Progressing

## 2024-11-14 NOTE — TRANSFER OF CARE
"Anesthesia Transfer of Care Note    Patient: Ivette Santillan    Procedure(s) Performed: Procedure(s) (LRB):  ARTHROPLASTY, SHOULDER, TOTAL, REVERSE/ Biomet (Right)    Patient location: PACU    Anesthesia Type: general    Transport from OR: Transported from OR on room air with adequate spontaneous ventilation    Post pain: adequate analgesia    Post assessment: no apparent anesthetic complications and tolerated procedure well    Post vital signs: stable    Level of consciousness: sedated    Nausea/Vomiting: no nausea/vomiting    Complications: none    Transfer of care protocol was followed      Last vitals: Visit Vitals  /80 (BP Location: Left arm, Patient Position: Lying)   Pulse 88   Temp 36.6 °C (97.9 °F)   Resp 20   Ht 5' 2" (1.575 m)   Wt 96.2 kg (212 lb 1.3 oz)   SpO2 96%   Breastfeeding No   BMI 38.79 kg/m²     "

## 2024-11-14 NOTE — ANESTHESIA PROCEDURE NOTES
Intubation    Date/Time: 11/14/2024 11:44 AM    Performed by: Reina Ndiaye CRNA  Authorized by: Alexis Mathews MD    Intubation:     Induction:  Intravenous    Intubated:  Postinduction    Mask Ventilation:  Easy with oral airway    Attempts:  2    Attempted By:  CRNA    Method of Intubation:  Direct    Blade:  [place] 2    Laryngeal View Grade: Grade III - only epiglottis visible      Attempted By (2nd Attempt):  CRNA    Method of Intubation (2nd Attempt):  Video laryngoscopy    Blade (2nd Attempt):  [place] 3    Laryngeal View Grade (2nd Attempt): Grade IIa - cords partially seen      Difficult Airway Encountered?: No      Complications:  None    Airway Device:  Oral endotracheal tube    Airway Device Size:  7.0    Style/Cuff Inflation:  Cuffed (inflated to minimal occlusive pressure)    Inflation Amount (mL):  6    Tube secured:  22    Secured at:  The lips    Placement Verified By:  Capnometry    Complicating Factors:  None    Findings Post-Intubation:  BS equal bilateral and atraumatic/condition of teeth unchanged

## 2024-11-14 NOTE — ANESTHESIA PROCEDURE NOTES
Peripheral Block supraclav    Patient location during procedure: pre-op   Block not for primary anesthetic.  Reason for block: at surgeon's request and post-op pain management   Post-op Pain Location: Shoulder pain R   Start time: 11/14/2024 11:12 AM  Timeout: 11/14/2024 11:11 AM   End time: 11/14/2024 11:21 AM    Staffing  Authorizing Provider: Alexis Mathews MD  Performing Provider: Alexis Mathews MD    Staffing  Performed by: Alexis Mathews MD  Authorized by: Alexis Mathews MD    Preanesthetic Checklist  Completed: patient identified, IV checked, site marked, risks and benefits discussed, surgical consent, monitors and equipment checked, pre-op evaluation and timeout performed  Peripheral Block  Patient position: supine  Prep: ChloraPrep and Mask worn, hand hygeine performed, sterile gloves worn  Patient monitoring: continuous pulse ox, frequent blood pressure checks and cardiac monitor (RN monitoring vitals throughout procedure.)  Block type: supraclavicular  Laterality: right  Injection technique: single shot  Needle  Needle type: Stimuplex   Needle gauge: 22 G  Needle length: 2 in  Needle localization: anatomical landmarks and ultrasound guidance  Needle insertion depth: 4 cm   -ultrasound image captured on disc.  Assessment  Injection assessment: negative aspiration, negative parasthesia and local visualized surrounding nerve  Paresthesia pain: none  Heart rate change: no  Slow fractionated injection: yes  Pain Tolerance: comfortable throughout block  Medications:    Medications: bupivacaine (pf) (MARCAINE) injection 0.25% - Perineural   20 mL - 11/14/2024 11:19:00 AM    Additional Notes  Block..See EMR for any note re current/previous paresthesia, dysesthesias or chronic pain complaints in limb to be blocked.  US used to observe needle trajectory, needle placed in close proximity to appropriate nerve structures, they appeared anatomically normal.  Deposition of LA in close proximity to  nerve structures observed.  Perm image saved.  Stimulation of appropriate muscles noted @ 0.5ma with a + Emerson test.  No resistance to injection was encountered.  No paraesthesia or aspiration of blood observed.  No pain with injection.  See anesthesia chart for any drugs used to supplement the block. Sedation was provided at a level for patient to be able to communicate any discomfort.    10 ml of Exparel also injected along with 0.25% Marcain mentioned previously.

## 2024-11-14 NOTE — ANESTHESIA PREPROCEDURE EVALUATION
"11/14/2024    Ivette Santillan is a 64 y.o., female, cleared by [facility]    Pre-op Assessment    I have reviewed the Patient Summary Reports.     I have reviewed the Nursing Notes. I have reviewed the NPO Status.   I have reviewed the Medications.     Review of Systems  Anesthesia Hx:  No problems with previous Anesthesia                Cardiovascular:  Exercise tolerance: good                                                 Pre-operative evaluation for Procedure(s) (LRB):  ARTHROPLASTY, SHOULDER, TOTAL, REVERSE/ Biomet (Right)    Ht 5' 2.01" (1.575 m)   Wt 96.2 kg (212 lb)   Breastfeeding No   BMI 38.77 kg/m²     Past Medical History:   Diagnosis Date    Allergic rhinitis due to pollen     Anxiety disorder, unspecified     Arthritis     Depression     GERD (gastroesophageal reflux disease)     HTN (hypertension)     Mild intermittent asthma, uncomplicated     Obesity, unspecified     Personal history of colonic polyps     Sleep apnea, unspecified        Patient Active Problem List   Diagnosis    Morbid (severe) obesity due to excess calories    Coronary artery disease    Hypertension    Eosinophilic asthma    Hyperlipidemia    SHARIF on CPAP    Shoulder impingement, right    Severe persistent asthma    Gastritis    Heartburn    Rhinitis    Osteoporosis       Review of patient's allergies indicates:   Allergen Reactions    Amoxicillin-pot clavulanate      Other reaction(s): rash    Clindamycin      Other reaction(s): nausea, vomiting    Ibuprofen      Other reaction(s): rash    Metoclopramide      Other reaction(s): anxiety       Current Outpatient Medications   Medication Instructions    albuterol (PROVENTIL/VENTOLIN HFA) 90 mcg/actuation inhaler 2 puffs, Inhalation, Every 4 hours PRN    ascorbic acid (vitamin C) (VITAMIN C) 1,000 mg    aspirin (ECOTRIN) 81 mg, Daily    AUVI-Q 0.3 mg/0.3 mL AtIn As needed (PRN)    azelastine-fluticasone (DYMISTA) 137-50 mcg/spray Spry nassal spray 1 spray, Each Nostril, 2 times daily    " benralizumab (FASENRA) 30 mg, Every 8 weeks    dicyclomine (BENTYL) 10 MG capsule TAKE ONE TABLET EVERY 6 HOURS. AS NEEDED FOR CRAMPS    diltiaZEM (DILACOR XR) 180 mg, Oral, Daily    EScitalopram oxalate (LEXAPRO) 10 mg, Oral, Daily    ezetimibe (ZETIA) 10 mg, Daily    famotidine (PEPCID) 40 mg, Oral, Nightly, 2 tablets at night    fexofenadine (ALLEGRA) 180 mg, Daily    fluticasone-umeclidin-vilanter (TRELEGY ELLIPTA) 200-62.5-25 mcg inhaler 1 puff, Inhalation    losartan-hydrochlorothiazide 100-25 mg (HYZAAR) 100-25 mg per tablet 1 tablet, Daily    metoprolol tartrate (LOPRESSOR) 25 mg, 2 times daily    montelukast (SINGULAIR) 10 mg, Oral, Nightly    multivitamin capsule 1 capsule, Daily    mupirocin (BACTROBAN) 2 % ointment Nasal, Daily    omeprazole (PRILOSEC) 40 mg, Oral, Daily    pravastatin (PRAVACHOL) 40 mg, Nightly    spironolactone (ALDACTONE) 100 mg, Oral, Daily       Past Surgical History:   Procedure Laterality Date    APPENDECTOMY      BREAST LUMPECTOMY      COLONOSCOPY  06/01/2017    JOINT REPLACEMENT  2018 , 2019    Knee replacement Bilateral       Social History     Socioeconomic History    Marital status:    Tobacco Use    Smoking status: Never    Smokeless tobacco: Never   Substance and Sexual Activity    Alcohol use: Never    Drug use: Never    Sexual activity: Yes     Partners: Male     Birth control/protection: Post-menopausal     Comment: With spouse only     Social Drivers of Health     Financial Resource Strain: Low Risk  (6/4/2024)    Overall Financial Resource Strain (CARDIA)     Difficulty of Paying Living Expenses: Not hard at all   Food Insecurity: No Food Insecurity (6/4/2024)    Hunger Vital Sign     Worried About Running Out of Food in the Last Year: Never true     Ran Out of Food in the Last Year: Never true   Transportation Needs: No Transportation Needs (5/13/2024)    TRANSPORTATION NEEDS     Transportation : No   Physical Activity: Insufficiently Active (6/4/2024)     "Exercise Vital Sign     Days of Exercise per Week: 4 days     Minutes of Exercise per Session: 20 min   Stress: No Stress Concern Present (6/4/2024)    [employer] - Occupational Stress Questionnaire     Feeling of Stress : Not at all   Housing Stability: Low Risk  (6/4/2024)    Housing Stability Vital Sign     Unable to Pay for Housing in the Last Year: No     Homeless in the Last Year: No         Physical Exam  General: Well nourished and Cooperative    Airway:  Mallampati: III   Mouth Opening: Normal  TM Distance: Normal  Tongue: Normal  Neck ROM: Normal ROM  Neck: Girth Increased    Dental:  Intact    Chest/Lungs:  Clear to auscultation    Heart:  Rhythm: Regular Rhythm        Lab Results   Component Value Date    WBC 8.46 10/30/2024    HGB 12.1 10/30/2024    HCT 37.9 10/30/2024    MCV 94.0 10/30/2024     10/30/2024          BMP  Lab Results   Component Value Date    HCT 37.9 10/30/2024     10/30/2024    K 4.2 10/30/2024    BUN 14.1 10/30/2024    CREATININE 0.83 10/30/2024    CALCIUM 9.8 10/30/2024        INR  No results for input(s): "PT", "INR", "PROTIME", "APTT" in the last 72 hours.      Diagnostic Studies:    11/14/24    .  EKG  Results for orders placed or performed in visit on 10/30/24   EKG 12-lead    Collection Time: 10/30/24 10:59 AM   Result Value Ref Range    QRS Duration 96 ms    [facility] QTC Calculation 395 ms    Narrative    Test Reason : M19.011,    Vent. Rate : 056 BPM     Atrial Rate : 056 BPM     P-R Int : 168 ms          QRS Dur : 096 ms      QT Int : 410 ms       P-R-T Axes : 028 -26 004 degrees     QTc Int : 395 ms    Sinus bradycardia  Voltage criteria for left ventricular hypertrophy  Nonspecific ST abnormality  Abnormal ECG  No previous ECGs available  Confirmed by Lc Ortega MD ([id number]) on 10/31/2024 10:26:40 AM    Referred By: CHRISTI AGRAWAL           Confirmed By:Lc Ortega MD         Anesthesia Plan  Type of Anesthesia, risks & benefits " discussed:    Anesthesia Type: Gen ETT  Intra-op Monitoring Plan: Standard ASA Monitors  Post Op Pain Control Plan: multimodal analgesia and peripheral nerve block  Induction:  IV  Informed Consent: Informed consent signed with the Patient and all parties understand the risks and agree with anesthesia plan.  All questions answered.   ASA Score: 3  Day of Surgery Review of History & Physical: H&P Update referred to the surgeon/provider.  Anesthesia Plan Notes: Has taken am doses of her asthma meds, has required po steroid few times a year for asthma exacerbations, never hospitalized,     Ready For Surgery From Anesthesia Perspective.     .  Anesthesia consent includes material facts, risks, complications & alternatives, and possibility of altering the anesthesia plan due to intraoperative conditions.    I reviewed problem list, prior to admission medication list, appropriate labs, any workup, Xray, EKG etc   See anesthesia chart for details of the anesthesia plan carried out.       Alexis Mathews MD

## 2024-11-14 NOTE — OR NURSING
1113  procedure time out performed for rt supraclav block, all agree  1117  started  1118  u/s guided rt supraclav block completed, pt raul well, vss, resp full and regular, continuous monitoring.

## 2024-11-14 NOTE — PT/OT/SLP EVAL
"Occupational Therapy   Evaluation    Name: Ivette Santillan  MRN: [id number]  Admitting Diagnosis: Osteoarthritis of right shoulder  Recent Surgery: Procedure(s) (LRB):  ARTHROPLASTY, SHOULDER, TOTAL, REVERSE/ Biomet (Right) Day of Surgery    Recommendations:     Discharge Recommendations: Low Intensity Therapy  Discharge Equipment Recommendations:  none  Barriers to discharge:   (time since onset)    Assessment:     Ivette Santillan is a 64 y.o. female with a medical diagnosis of Osteoarthritis of right shoulder. Performance deficits affecting function: weakness, decreased lower extremity function, decreased upper extremity function, impaired balance, gait instability, impaired self care skills, impaired joint extensibility, orthopedic precautions, impaired fine motor, impaired coordination, decreased ROM.      Rehab Prognosis: Good; patient would benefit from acute skilled OT services to address these deficits and reach maximum level of function.       Plan:     Patient to be seen daily (BID) to address the above listed problems via self-care/home management, therapeutic activities, therapeutic exercises  Plan of Care Expires: 11/20/24  Plan of Care Reviewed with: patient, spouse    Subjective     Chief Complaint: lightheaded  Patient/Family Comments/goals: "I need to sit down."    Occupational Profile:  Living Environment: lives with  in [facility] with 5 KIT, no HR. Has WIS with [facility]. Has shower chair available for use.  Previous level of function: mod A for self care and home management  Roles and Routines: retired DON of  [employer]  Equipment Used at Home: none  Assistance upon Discharge:   Patient was completing home exercises prior to surgery.     Pain/Comfort:  Pain Rating 1: 0/10    Patients cultural, spiritual, Hinduism conflicts given the current situation: no    Objective:     Communicated with: NSG prior to session.  Patient found HOB elevated with peripheral IV, blood pressure cuff upon OT " entry to room.    General Precautions: Standard, fall  Orthopedic Precautions: RUE non weight bearing  Braces: Shoulder abduction brace  Respiratory Status: Room air    Occupational Performance:    Bed Mobility:    Patient completed Supine to Sit with minimum assistance    Functional Mobility/Transfers:  Patient completed Sit <> Stand Transfer with minimum assistance  with  no assistive device   Functional Mobility: Patient performed FM in hallway with hand-held assist and CGA, tolerated ~100 feet with no LOBs or rest breaks needed.    Cognitive/Visual Perceptual:  Cognitive/Psychosocial Skills:     -       Oriented to: Person, Place, Time, and Situation   -       Follows Commands/attention:Follows multistep  commands  -       Communication: clear/fluent  -       Memory: No Deficits noted  -       Safety awareness/insight to disability: intact   -       Mood/Affect/Coping skills/emotional control: Appropriate to situation, Cooperative, and Pleasant  Visual/Perceptual:      -Intact with corrective lenses    Physical Exam:  Motor Planning: -       intact  Upper Extremity Range of Motion:     -       Right Upper Extremity: NT due to surgical precautions  -       Left Upper Extremity: WFL  Upper Extremity Strength:    -       Right Upper Extremity: NT due to surgical precautions  -       Left Upper Extremity: WFL    Treatment & Education:  Patient and spouse educated on roles and goals of occupational therapy, POC for acute stay. Educated on weightbearing status.    Patient left up in chair with all lines intact, call button in reach, Niurka RN notified, and  present    GOALS:   Multidisciplinary Problems       Occupational Therapy Goals          Problem: Occupational Therapy    Goal Priority Disciplines Outcome Interventions   Occupational Therapy Goal     OT, PT/OT Progressing    Description: Pt will perform UB dress c Touch assist using hemitechniques by d/c.   Pt will perform LB dress c Touch assist using  hemitechniques by d/c.  Pt will perform toilet t/f using LRAD c SBA assist by d/c.  Pt will perform toileting c SBA assist by d/c.   Pt will perform Walk in shower t/f c standby assist by d/c.   Pt will be Ind c HEP and pxns Compliance by d/c.                         History:     Past Medical History:   Diagnosis Date    Allergic rhinitis due to pollen     Anxiety disorder, unspecified     Arthritis     Depression     GERD (gastroesophageal reflux disease)     HTN (hypertension)     Mild intermittent asthma, uncomplicated     Obesity, unspecified     Personal history of colonic polyps     Sleep apnea, unspecified     cpap         Past Surgical History:   Procedure Laterality Date    APPENDECTOMY      BREAST LUMPECTOMY Bilateral     benign    COLONOSCOPY  06/01/2017    JOINT REPLACEMENT  2018 , 2019    Knee replacement Bilateral       Time Tracking:     OT Date of Treatment: 11/14/24  OT Start Time: 1652  OT Stop Time: 1715  OT Total Time (min): 23 min    Billable Minutes:Evaluation 23 11/14/2024

## 2024-11-15 PROBLEM — E87.1 HYPONATREMIA: Status: ACTIVE | Noted: 2024-11-15

## 2024-11-15 LAB
ANION GAP SERPL CALC-SCNC: 11 MEQ/L
ANION GAP SERPL CALC-SCNC: 11 MEQ/L
BUN SERPL-MCNC: 15.1 MG/DL (ref 9.8–20.1)
BUN SERPL-MCNC: 16.4 MG/DL (ref 9.8–20.1)
CALCIUM SERPL-MCNC: 8.3 MG/DL (ref 8.4–10.2)
CALCIUM SERPL-MCNC: 8.4 MG/DL (ref 8.4–10.2)
CHLORIDE SERPL-SCNC: 92 MMOL/L (ref 98–107)
CHLORIDE SERPL-SCNC: 95 MMOL/L (ref 98–107)
CO2 SERPL-SCNC: 21 MMOL/L (ref 23–31)
CO2 SERPL-SCNC: 22 MMOL/L (ref 23–31)
CREAT SERPL-MCNC: 0.75 MG/DL (ref 0.55–1.02)
CREAT SERPL-MCNC: 0.82 MG/DL (ref 0.55–1.02)
CREAT/UREA NIT SERPL: 18
CREAT/UREA NIT SERPL: 22
ERYTHROCYTE [DISTWIDTH] IN BLOOD BY AUTOMATED COUNT: 12.5 % (ref 11.5–17)
GFR SERPLBLD CREATININE-BSD FMLA CKD-EPI: >60 ML/MIN/1.73/M2
GFR SERPLBLD CREATININE-BSD FMLA CKD-EPI: >60 ML/MIN/1.73/M2
GLUCOSE SERPL-MCNC: 121 MG/DL (ref 82–115)
GLUCOSE SERPL-MCNC: 125 MG/DL (ref 82–115)
HCT VFR BLD AUTO: 31.6 % (ref 37–47)
HGB BLD-MCNC: 10.7 G/DL (ref 12–16)
MCH RBC QN AUTO: 30.7 PG (ref 27–31)
MCHC RBC AUTO-ENTMCNC: 33.9 G/DL (ref 33–36)
MCV RBC AUTO: 90.5 FL (ref 80–94)
NRBC BLD AUTO-RTO: 0 %
OSMOLALITY UR: 196 MOSM/KG (ref 300–1300)
PLATELET # BLD AUTO: 305 X10(3)/MCL (ref 130–400)
PMV BLD AUTO: 9.9 FL (ref 7.4–10.4)
POTASSIUM SERPL-SCNC: 4 MMOL/L (ref 3.5–5.1)
POTASSIUM SERPL-SCNC: 4.3 MMOL/L (ref 3.5–5.1)
RBC # BLD AUTO: 3.49 X10(6)/MCL (ref 4.2–5.4)
SODIUM SERPL-SCNC: 125 MMOL/L (ref 136–145)
SODIUM SERPL-SCNC: 127 MMOL/L (ref 136–145)
WBC # BLD AUTO: 15.7 X10(3)/MCL (ref 4.5–11.5)

## 2024-11-15 PROCEDURE — 80048 BASIC METABOLIC PNL TOTAL CA: CPT | Mod: 91 | Performed by: NURSE PRACTITIONER

## 2024-11-15 PROCEDURE — 36415 COLL VENOUS BLD VENIPUNCTURE: CPT | Mod: 91 | Performed by: NURSE PRACTITIONER

## 2024-11-15 PROCEDURE — 99900035 HC TECH TIME PER 15 MIN (STAT)

## 2024-11-15 PROCEDURE — 99900031 HC PATIENT EDUCATION (STAT)

## 2024-11-15 PROCEDURE — 83935 ASSAY OF URINE OSMOLALITY: CPT | Performed by: INTERNAL MEDICINE

## 2024-11-15 PROCEDURE — 97110 THERAPEUTIC EXERCISES: CPT

## 2024-11-15 PROCEDURE — G0378 HOSPITAL OBSERVATION PER HR: HCPCS

## 2024-11-15 PROCEDURE — 97530 THERAPEUTIC ACTIVITIES: CPT

## 2024-11-15 PROCEDURE — 36415 COLL VENOUS BLD VENIPUNCTURE: CPT

## 2024-11-15 PROCEDURE — 25000003 PHARM REV CODE 250: Performed by: REHABILITATION UNIT

## 2024-11-15 PROCEDURE — 94761 N-INVAS EAR/PLS OXIMETRY MLT: CPT

## 2024-11-15 PROCEDURE — 25000003 PHARM REV CODE 250: Performed by: NURSE PRACTITIONER

## 2024-11-15 PROCEDURE — 80048 BASIC METABOLIC PNL TOTAL CA: CPT

## 2024-11-15 PROCEDURE — 25000003 PHARM REV CODE 250

## 2024-11-15 PROCEDURE — 85027 COMPLETE CBC AUTOMATED: CPT

## 2024-11-15 PROCEDURE — 25000003 PHARM REV CODE 250: Performed by: INTERNAL MEDICINE

## 2024-11-15 PROCEDURE — 94799 UNLISTED PULMONARY SVC/PX: CPT

## 2024-11-15 PROCEDURE — 63600175 PHARM REV CODE 636 W HCPCS

## 2024-11-15 PROCEDURE — 97535 SELF CARE MNGMENT TRAINING: CPT

## 2024-11-15 RX ORDER — CEFADROXIL 500 MG/1
500 CAPSULE ORAL EVERY 12 HOURS
Qty: 14 CAPSULE | Refills: 0 | Status: SHIPPED | OUTPATIENT
Start: 2024-11-15 | End: 2024-11-22

## 2024-11-15 RX ORDER — HYDROCODONE BITARTRATE AND ACETAMINOPHEN 5; 325 MG/1; MG/1
1 TABLET ORAL EVERY 4 HOURS PRN
Qty: 42 TABLET | Refills: 0 | Status: SHIPPED | OUTPATIENT
Start: 2024-11-15 | End: 2024-11-21 | Stop reason: SDUPTHER

## 2024-11-15 RX ORDER — POLYETHYLENE GLYCOL 3350 17 G/17G
17 POWDER, FOR SOLUTION ORAL DAILY
Qty: 14 EACH | Refills: 0 | Status: SHIPPED | OUTPATIENT
Start: 2024-11-15 | End: 2024-11-29

## 2024-11-15 RX ORDER — SODIUM CHLORIDE 1 G/1
2000 TABLET ORAL 2 TIMES DAILY
Status: DISCONTINUED | OUTPATIENT
Start: 2024-11-15 | End: 2024-11-16

## 2024-11-15 RX ORDER — KETOROLAC TROMETHAMINE 10 MG/1
10 TABLET, FILM COATED ORAL EVERY 8 HOURS
Qty: 15 TABLET | Refills: 0 | Status: SHIPPED | OUTPATIENT
Start: 2024-11-15 | End: 2024-11-20

## 2024-11-15 RX ORDER — ONDANSETRON 4 MG/1
4 TABLET, ORALLY DISINTEGRATING ORAL EVERY 6 HOURS PRN
Qty: 30 TABLET | Refills: 1 | Status: SHIPPED | OUTPATIENT
Start: 2024-11-15 | End: 2024-11-30

## 2024-11-15 RX ORDER — FAMOTIDINE 20 MG/1
20 TABLET, FILM COATED ORAL 2 TIMES DAILY
Status: DISCONTINUED | OUTPATIENT
Start: 2024-11-15 | End: 2024-11-16

## 2024-11-15 RX ORDER — SODIUM CHLORIDE 9 MG/ML
INJECTION, SOLUTION INTRAVENOUS CONTINUOUS
Status: DISCONTINUED | OUTPATIENT
Start: 2024-11-15 | End: 2024-11-16

## 2024-11-15 RX ORDER — FUROSEMIDE 40 MG/1
40 TABLET ORAL ONCE
Status: COMPLETED | OUTPATIENT
Start: 2024-11-15 | End: 2024-11-15

## 2024-11-15 RX ORDER — METHOCARBAMOL 750 MG/1
750 TABLET, FILM COATED ORAL EVERY 6 HOURS PRN
Qty: 56 TABLET | Refills: 0 | Status: SHIPPED | OUTPATIENT
Start: 2024-11-15 | End: 2024-11-29

## 2024-11-15 RX ORDER — SODIUM CHLORIDE 1 G/1
2000 TABLET ORAL 2 TIMES DAILY
Status: DISCONTINUED | OUTPATIENT
Start: 2024-11-15 | End: 2024-11-15

## 2024-11-15 RX ORDER — ASPIRIN 81 MG/1
81 TABLET ORAL DAILY
Start: 2024-11-15

## 2024-11-15 RX ORDER — SODIUM CHLORIDE 1 G/1
2000 TABLET ORAL 2 TIMES DAILY
Qty: 12 TABLET | Refills: 0 | Status: SHIPPED | OUTPATIENT
Start: 2024-11-15 | End: 2024-11-18

## 2024-11-15 RX ORDER — SODIUM CHLORIDE 1 G/1
1000 TABLET ORAL ONCE
Status: DISCONTINUED | OUTPATIENT
Start: 2024-11-15 | End: 2024-11-15

## 2024-11-15 RX ADMIN — METOPROLOL TARTRATE 25 MG: 25 TABLET, FILM COATED ORAL at 08:11

## 2024-11-15 RX ADMIN — SENNOSIDES AND DOCUSATE SODIUM 2 TABLET: 50; 8.6 TABLET ORAL at 08:11

## 2024-11-15 RX ADMIN — ESCITALOPRAM OXALATE 10 MG: 10 TABLET ORAL at 08:11

## 2024-11-15 RX ADMIN — DOCUSATE SODIUM 200 MG: 100 CAPSULE, LIQUID FILLED ORAL at 06:11

## 2024-11-15 RX ADMIN — SODIUM CHLORIDE: 9 INJECTION, SOLUTION INTRAVENOUS at 12:11

## 2024-11-15 RX ADMIN — ONDANSETRON 4 MG: 2 INJECTION INTRAMUSCULAR; INTRAVENOUS at 06:11

## 2024-11-15 RX ADMIN — FAMOTIDINE 20 MG: 20 TABLET, FILM COATED ORAL at 09:11

## 2024-11-15 RX ADMIN — SODIUM CHLORIDE 2000 MG: 1 TABLET ORAL at 09:11

## 2024-11-15 RX ADMIN — ONDANSETRON 4 MG: 2 INJECTION INTRAMUSCULAR; INTRAVENOUS at 09:11

## 2024-11-15 RX ADMIN — KETOROLAC TROMETHAMINE 10 MG: 10 TABLET, FILM COATED ORAL at 06:11

## 2024-11-15 RX ADMIN — CEFAZOLIN 2 G: 2 INJECTION, POWDER, FOR SOLUTION INTRAMUSCULAR; INTRAVENOUS at 08:11

## 2024-11-15 RX ADMIN — METOPROLOL TARTRATE 25 MG: 25 TABLET, FILM COATED ORAL at 09:11

## 2024-11-15 RX ADMIN — KETOROLAC TROMETHAMINE 10 MG: 10 TABLET, FILM COATED ORAL at 12:11

## 2024-11-15 RX ADMIN — SODIUM CHLORIDE 2000 MG: 1 TABLET ORAL at 06:11

## 2024-11-15 RX ADMIN — CEFAZOLIN 2 G: 2 INJECTION, POWDER, FOR SOLUTION INTRAMUSCULAR; INTRAVENOUS at 12:11

## 2024-11-15 RX ADMIN — GABAPENTIN 300 MG: 300 CAPSULE ORAL at 09:11

## 2024-11-15 RX ADMIN — ASPIRIN 81 MG 81 MG: 81 TABLET ORAL at 08:11

## 2024-11-15 RX ADMIN — SENNOSIDES AND DOCUSATE SODIUM 2 TABLET: 50; 8.6 TABLET ORAL at 09:11

## 2024-11-15 RX ADMIN — FUROSEMIDE 40 MG: 40 TABLET ORAL at 06:11

## 2024-11-15 NOTE — ANESTHESIA POSTPROCEDURE EVALUATION
Anesthesia Post Evaluation    Patient: Ivette Santillan    Procedure(s) Performed: Procedure(s) (LRB):  ARTHROPLASTY, SHOULDER, TOTAL, REVERSE/ Biomet (Right)    Final Anesthesia Type: general (/Regional//MAC)      Patient location during evaluation: PACU  Post-procedure mental status: @ basline.  Pain management: adequate    PONV status: See postop meds for drugs used to control n/v if any.  Anesthetic complications: no      Cardiovascular status: blood pressure returned to baseline  Respiratory status: @ baseline.  Hydration status: euvolemic            Stable nerve block    Vitals Value Taken Time   /54 11/15/24 0752   Temp 36.7 °C (98 °F) 11/15/24 0752   Pulse 74 11/15/24 0752   Resp 18 11/15/24 0433   SpO2 96 % 11/15/24 0752         Event Time   Out of Recovery 15:10:00         Pain/Saima Score: Pain Rating Prior to Med Admin: 1 (11/15/2024  6:13 AM)  Pain Rating Post Med Admin: 0 (11/15/2024  1:26 AM)  Saima Score: 9 (11/14/2024  2:58 PM)

## 2024-11-15 NOTE — PT/OT/SLP PROGRESS
Occupational Therapy   Treatment    Name: Ivette Santillan  MRN: [id number]  Admitting Diagnosis:  Osteoarthritis of right shoulder  1 Day Post-Op    Recommendations:     Discharge Recommendations: Low Intensity Therapy  Discharge Equipment Recommendations:  none  Barriers to discharge:   (time since onset)    Assessment:     Ivette Santillan is a 64 y.o. female with a medical diagnosis of Osteoarthritis of right shoulder. Performance deficits affecting function are weakness, impaired endurance, orthopedic precautions, impaired joint extensibility, edema, decreased ROM, decreased upper extremity function, impaired functional mobility, gait instability, impaired self care skills.     Rehab Prognosis:  Fair; patient would benefit from acute skilled OT services to address these deficits and reach maximum level of function.       Plan:     Patient to be seen daily (BID) to address the above listed problems via self-care/home management, therapeutic activities, therapeutic exercises  Plan of Care Expires: 11/20/24  Plan of Care Reviewed with: spouse, patient    Subjective     Chief Complaint: weakness  Patient/Family Comments/goals: nausea  Pain/Comfort:  Pain Rating 1: 0/10    Objective:     Communicated with: NSG prior to session.  Patient found up in chair with peripheral IV upon OT entry to room.    General Precautions: Standard, fall    Orthopedic Precautions:RUE non weight bearing  Braces: Shoulder abduction brace  Respiratory Status: Room air     Occupational Performance:     Functional Mobility/Transfers:  Patient completed Sit <> Stand Transfer with contact guard assistance  with  hand-held assist   Patient completed  Shower Transfer Step Transfer technique with stand by assistance with hand-held assist  Patient completed car transfer step transfer technique with stand by assistance  Patient ascended and descended 3 steps with SBA using L HR.  Functional Mobility: Pt performed FM in hallway with CGA and hand-held assist,  tolerated ~150 feet with no LOBs. Rest break needed after ambulation due to weakness.    Treatment & Education:  Performed 30 reps of distal AROM HEP with min verbal cues and OT demonstration. 30 reps of Codman's pendulum swings, unable to tolerate counterclockwise direction secondary to nausea.     Patient left up in chair with all lines intact, call button in reach, VIVI Bah notified, and  present    GOALS:   Multidisciplinary Problems       Occupational Therapy Goals          Problem: Occupational Therapy    Goal Priority Disciplines Outcome Interventions   Occupational Therapy Goal     OT, PT/OT Progressing    Description: Pt will perform UB dress c Touch assist using hemitechniques by d/c.   Pt will perform LB dress c Touch assist using hemitechniques by d/c.  Pt will perform toilet t/f using LRAD c SBA assist by d/c.  Pt will perform toileting c SBA assist by d/c.   Pt will perform Walk in shower t/f c standby assist by d/c. MET  Pt will be Ind c HEP and pxns Compliance by d/c.                         Time Tracking:     OT Date of Treatment: 11/15/24  OT Start Time: 1308  OT Stop Time: 1346  OT Total Time (min): 38 min    Billable Minutes:Therapeutic Activity 15  Therapeutic Exercise 23    OT/EVELYN: OT          11/15/2024

## 2024-11-15 NOTE — PLAN OF CARE
Problem: Occupational Therapy  Goal: Occupational Therapy Goal  Description: Pt will perform UB dress c Touch assist using hemitechniques by d/c.   Pt will perform LB dress c Touch assist using hemitechniques by d/c.  Pt will perform toilet t/f using LRAD c SBA assist by d/c.  Pt will perform toileting c SBA assist by d/c.   Pt will perform Walk in shower t/f c standby assist by d/c. MET  Pt will be Ind c HEP and pxns Compliance by d/c.    Outcome: Progressing

## 2024-11-15 NOTE — PLAN OF CARE
11/15/24 0846   Discharge Assessment   Assessment Type Discharge Planning Assessment   Source of Information patient;family   Does patient/caregiver understand observation status Yes   Communicated ANDI with patient/caregiver Yes   Reason For Admission s/p RTSR   People in Home spouse   Do you expect to return to your current living situation? Yes   Do you have help at home or someone to help you manage your care at home? Yes   Who are your caregiver(s) and their phone number(s)?  - JACOB [phone]   Prior to hospitilization cognitive status: Alert/Oriented   Current cognitive status: Alert/Oriented   Walking or Climbing Stairs Difficulty no   Dressing/Bathing Difficulty yes   Dressing/Bathing bathing difficulty, requires equipment;bathing difficulty, assistance 1 person;dressing difficulty, requires equipment;dressing difficulty, assistance 1 person   Equipment Currently Used at Home none   Readmission within 30 days? No   Patient currently being followed by outpatient case management? No   Do you currently have service(s) that help you manage your care at home? No   Do you take prescription medications? Yes   Do you have prescription coverage? Yes   Do you have any problems affording any of your prescribed medications? No   Is the patient taking medications as prescribed? yes   Who is going to help you get home at discharge?    How do you get to doctors appointments? car, drives self   Are you on dialysis? No   Do you take coumadin? No   Discharge Plan A Home with family   Discharge Plan B Home with family   DME Needed Upon Discharge  other (see comments)  (SLING MAINTAINED)   Discharge Plan discussed with: Patient;Spouse/sig other   Transition of Care Barriers None     S/p RTSR. Spk w pt & , Jacob-- hsb to asst w homecare. Sling maintained. Pt requesting ice machine -- left message for Anselmo thapa: [facility]. Per MD, plan to start outpt therapy after office visit. Pt notified- verb under. No  additional dcp needs identified.       Contact # Ueaz [phone]

## 2024-11-15 NOTE — PROGRESS NOTES
[facility] - Orthopaedics  Orthopedics  Progress Note    Patient Name: Ivette Santillan  MRN: [id number]  Admission Date: 11/14/2024  Hospital Length of Stay: 1 days  Attending Provider: Jarrell Enriquez MD  Primary Care Provider: Tanya Lagunas MD  Follow-up For: Procedure(s) (LRB):  ARTHROPLASTY, SHOULDER, TOTAL, REVERSE/ Biomet (Right)    Post-Operative Day: 1 Day Post-Op  Subjective:     Principal Problem:Osteoarthritis of right shoulder    Interval History:   POD#1 R rTSA    Doing well. Sitting in bedside chair. Pain controlled. Ambulating. Block wearing off. +cough     Review of patient's allergies indicates:   Allergen Reactions    Amoxicillin-pot clavulanate      Other reaction(s): rash    Clindamycin      Other reaction(s): nausea, vomiting    Ibuprofen      Other reaction(s): rash    Metoclopramide      Other reaction(s): anxiety       Current Facility-Administered Medications   Medication    0.9%  NaCl infusion    albuterol inhaler 2 puff    aluminum-magnesium hydroxide-simethicone 200-200-20 mg/5 mL suspension 30 mL    aspirin chewable tablet 81 mg    [START ON 11/17/2024] bisacodyL suppository 20 mg    ceFAZolin 2 g    docusate sodium capsule 200 mg    EScitalopram oxalate tablet 10 mg    fluticasone furoate 200 mcg/actuation inhaler 1 puff    And    umeclidinium-vilanteroL 62.5-25 mcg/actuation DsDv 1 puff    gabapentin capsule 300 mg    HYDROcodone-acetaminophen 5-325 mg per tablet 1 tablet    ketorolac tablet 10 mg    losartan tablet 100 mg    magnesium hydroxide 400 mg/5 ml suspension 2,400 mg    melatonin tablet 6 mg    methocarbamoL tablet 750 mg    metoprolol tartrate (LOPRESSOR) tablet 25 mg    morphine injection 4 mg    ondansetron injection 4 mg    polyethylene glycol packet 17 g    senna-docusate 8.6-50 mg per tablet 2 tablet    sodium chloride oral tablet 2,000 mg    traMADoL tablet 50 mg     Objective:     Vital Signs (Most Recent):  Temp: 98 °F (36.7 °C) (11/15/24  "0752)  Pulse: 74 (11/15/24 0752)  Resp: 18 (11/15/24 0433)  BP: (!) 134/54 (11/15/24 0752)  SpO2: 96 % (11/15/24 0752) Vital Signs (24h Range):  Temp:  [97.6 °F (36.4 °C)-98 °F (36.7 °C)] 98 °F (36.7 °C)  Pulse:  [63-88] 74  Resp:  [18-21] 18  SpO2:  [91 %-97 %] 96 %  BP: (113-167)/(54-94) 134/54     Weight: 96.2 kg (212 lb 1.3 oz)  Height: 5' 2" (157.5 cm)  Body mass index is 38.79 kg/m².      Intake/Output Summary (Last 24 hours) at 11/15/2024 0810  Last data filed at 11/14/2024 1234  Gross per 24 hour   Intake 100 ml   Output --   Net 100 ml     Gen: nad  Pulm: unlabored on RA  CV: well perfused  Abd: nondistended    RUE  Dressing c/d/I  Ecchymosis about shoulder  Sling in place  4/5 ain/pin/uln  Well perfused  Freer m/r/u    Significant Labs: BMP:   Recent Labs   Lab 11/15/24  0456   *   K 4.0   CL 95*   CO2 21*   BUN 15.1   CREATININE 0.82   CALCIUM 8.4     CBC:   Recent Labs   Lab 11/15/24  0456   WBC 15.70*   HGB 10.7*   HCT 31.6*        All pertinent labs within the past 24 hours have been reviewed.    Significant Imaging: I have reviewed all pertinent imaging results/findings.    Assessment/Plan:     Active Diagnoses:    Diagnosis Date Noted POA    PRINCIPAL PROBLEM:  Osteoarthritis of right shoulder [M19.011] 11/14/2024 Yes      Problems Resolved During this Admission:     POD#1 R rTSA    Doing well. Pain control. Mobilize. DVT ppx -  aspirin. Likely d/c later today. F/u in 2 weeks for wound check, XR, and to start therapy.     Jarrell Enriquez MD  Orthopedics  [facility] - Orthopaedics  "

## 2024-11-15 NOTE — PT/OT/SLP PROGRESS
Occupational Therapy   Treatment    Name: Ivette Santillan  MRN: [id number]  Admitting Diagnosis:  Osteoarthritis of right shoulder  1 Day Post-Op    Recommendations:     Discharge Recommendations: Low Intensity Therapy  Discharge Equipment Recommendations:  none  Barriers to discharge:   (time since onset)    Assessment:     Ivette Santillan is a 64 y.o. female with a medical diagnosis of Osteoarthritis of right shoulder.  Performance deficits affecting function are weakness, impaired endurance, pain, decreased safety awareness, decreased upper extremity function, impaired balance, gait instability, impaired self care skills, impaired joint extensibility, impaired muscle length, orthopedic precautions, decreased ROM.     Rehab Prognosis:  Fair; patient would benefit from acute skilled OT services to address these deficits and reach maximum level of function.       Plan:     Patient to be seen daily (BID) to address the above listed problems via self-care/home management, therapeutic activities, therapeutic exercises  Plan of Care Expires: 11/20/24  Plan of Care Reviewed with: patient, spouse    Subjective     Chief Complaint: weakness  Patient/Family Comments/goals: episode of vomiting  Pain/Comfort:  Pain Rating 1: 0/10    Objective:     Communicated with: NSG prior to session.  Patient found up in chair with peripheral IV upon OT entry to room.    General Precautions: Standard, fall    Orthopedic Precautions:RUE non weight bearing  Braces: Shoulder abduction brace  Respiratory Status: Room air     Occupational Performance:     Functional Mobility/Transfers:  Patient completed Sit <> Stand Transfer with contact guard assistance  with  hand-held assist   Patient completed Toilet Transfer Step Transfer technique with contact guard assistance with  hand-held assist  Functional Mobility: in-room FM for toilet tranfer    Activities of Daily Living:  Bathing: minimum assistance with wash-off, assistance with applying deodorant  under R arm and cleaning L arm  Upper Body Dressing: minimum assistance with threading RUE into shirt using  hemitechniques  Lower Body Dressing: minimum assistance with pulling pants up over hips    Treatment & Education:  Patient educated on technique for distal AROM HEP - performed 30 reps of the following exercises: elbow flexion and extension, forearm pronation and supination, wrist flexion and extension, radial and ulnar deviation, and digit flexion and extension. Patient performed Codman's pendulum swings, 30 reps in each direction. Reviewed frequency of HEP following discharge, patient verbalized understanding. Educated on technique for doffing and donning shoulder abduction brace. Continued education warranted.     Patient left up in chair with all lines intact, call button in reach, and  present    GOALS:   Multidisciplinary Problems       Occupational Therapy Goals          Problem: Occupational Therapy    Goal Priority Disciplines Outcome Interventions   Occupational Therapy Goal     OT, PT/OT Progressing    Description: Pt will perform UB dress c Touch assist using hemitechniques by d/c.   Pt will perform LB dress c Touch assist using hemitechniques by d/c.  Pt will perform toilet t/f using LRAD c SBA assist by d/c.  Pt will perform toileting c SBA assist by d/c.   Pt will perform Walk in shower t/f c standby assist by d/c.   Pt will be Ind c HEP and pxns Compliance by d/c.                         Time Tracking:     OT Date of Treatment: 11/15/24  OT Start Time: 0904  OT Stop Time: 0959  OT Total Time (min): 55 min    Billable Minutes:Self Care/Home Management 30  Therapeutic Exercise 25    OT/EVELYN: OT          11/15/2024

## 2024-11-15 NOTE — PLAN OF CARE
Problem: Adult Inpatient Plan of Care  Goal: Plan of Care Review  Outcome: Progressing  Goal: Patient-Specific Goal (Individualized)  Outcome: Progressing  Goal: Absence of Hospital-Acquired Illness or Injury  Outcome: Progressing  Goal: Optimal Comfort and Wellbeing  Outcome: Progressing  Goal: Readiness for Transition of Care  Outcome: Progressing     Problem: Wound  Goal: Optimal Coping  Outcome: Progressing  Goal: Optimal Functional Ability  Outcome: Progressing  Goal: Absence of Infection Signs and Symptoms  Outcome: Progressing  Goal: Improved Oral Intake  Outcome: Progressing  Goal: Optimal Pain Control and Function  Outcome: Progressing  Goal: Skin Health and Integrity  Outcome: Progressing  Goal: Optimal Wound Healing  Outcome: Progressing     Problem: Infection  Goal: Absence of Infection Signs and Symptoms  Outcome: Progressing     Problem: Shoulder Arthroplasty (Total, Bright, Reverse)  Goal: Optimal Coping  Outcome: Progressing  Goal: Absence of Bleeding  Outcome: Progressing  Goal: Effective Bowel Elimination  Outcome: Progressing  Goal: Fluid and Electrolyte Balance  Outcome: Progressing  Goal: Optimal Functional Ability  Outcome: Progressing  Goal: Absence of Infection Signs and Symptoms  Outcome: Progressing  Goal: Intact Neurovascular Status  Outcome: Progressing  Goal: Anesthesia/Sedation Recovery  Outcome: Progressing  Goal: Acceptable Pain Control  Outcome: Progressing  Goal: Nausea and Vomiting Relief  Outcome: Progressing  Goal: Effective Urinary Elimination  Outcome: Progressing     Problem: Fall Injury Risk  Goal: Absence of Fall and Fall-Related Injury  Outcome: Progressing     Problem: Adult Inpatient Plan of Care  Goal: Plan of Care Review  Outcome: Progressing  Goal: Patient-Specific Goal (Individualized)  Outcome: Progressing  Goal: Absence of Hospital-Acquired Illness or Injury  Outcome: Progressing  Goal: Optimal Comfort and Wellbeing  Outcome: Progressing  Goal: Readiness for  Transition of Care  Outcome: Progressing     Problem: Wound  Goal: Optimal Coping  Outcome: Progressing  Goal: Optimal Functional Ability  Outcome: Progressing  Goal: Absence of Infection Signs and Symptoms  Outcome: Progressing  Goal: Improved Oral Intake  Outcome: Progressing  Goal: Optimal Pain Control and Function  Outcome: Progressing  Goal: Skin Health and Integrity  Outcome: Progressing  Goal: Optimal Wound Healing  Outcome: Progressing     Problem: Infection  Goal: Absence of Infection Signs and Symptoms  Outcome: Progressing     Problem: Shoulder Arthroplasty (Total, Bright, Reverse)  Goal: Optimal Coping  Outcome: Progressing  Goal: Absence of Bleeding  Outcome: Progressing  Goal: Effective Bowel Elimination  Outcome: Progressing  Goal: Fluid and Electrolyte Balance  Outcome: Progressing  Goal: Optimal Functional Ability  Outcome: Progressing  Goal: Absence of Infection Signs and Symptoms  Outcome: Progressing  Goal: Intact Neurovascular Status  Outcome: Progressing  Goal: Anesthesia/Sedation Recovery  Outcome: Progressing  Goal: Acceptable Pain Control  Outcome: Progressing  Goal: Nausea and Vomiting Relief  Outcome: Progressing  Goal: Effective Urinary Elimination  Outcome: Progressing     Problem: Fall Injury Risk  Goal: Absence of Fall and Fall-Related Injury  Outcome: Progressing

## 2024-11-15 NOTE — ANESTHESIA POSTPROCEDURE EVALUATION
Anesthesia Post Evaluation    Patient: Ivette Santillan    Procedure(s) Performed: Procedure(s) (LRB):  ARTHROPLASTY, SHOULDER, TOTAL, REVERSE/ Biomet (Right)    [facility] Anesthesia Post Op Evaluation      Vitals Value Taken Time   /54 11/15/24 0752   Temp 36.7 °C (98 °F) 11/15/24 0752   Pulse 74 11/15/24 0752   Resp 18 11/15/24 0433   SpO2 96 % 11/15/24 0752         Event Time   Out of Recovery 15:10:00         Pain/Saima Score: Pain Rating Prior to Med Admin: 1 (11/15/2024  6:13 AM)  Pain Rating Post Med Admin: 0 (11/15/2024  1:26 AM)  Saima Score: 9 (11/14/2024  2:58 PM)        Stable nerve block

## 2024-11-16 VITALS
BODY MASS INDEX: 39.02 KG/M2 | HEART RATE: 66 BPM | TEMPERATURE: 98 F | WEIGHT: 212.06 LBS | SYSTOLIC BLOOD PRESSURE: 125 MMHG | HEIGHT: 62 IN | RESPIRATION RATE: 19 BRPM | OXYGEN SATURATION: 95 % | DIASTOLIC BLOOD PRESSURE: 69 MMHG

## 2024-11-16 LAB
ANION GAP SERPL CALC-SCNC: 10 MEQ/L
ANION GAP SERPL CALC-SCNC: 9 MEQ/L
BUN SERPL-MCNC: 12.9 MG/DL (ref 9.8–20.1)
BUN SERPL-MCNC: 13.3 MG/DL (ref 9.8–20.1)
CALCIUM SERPL-MCNC: 8.4 MG/DL (ref 8.4–10.2)
CALCIUM SERPL-MCNC: 8.8 MG/DL (ref 8.4–10.2)
CHLORIDE SERPL-SCNC: 96 MMOL/L (ref 98–107)
CHLORIDE SERPL-SCNC: 97 MMOL/L (ref 98–107)
CO2 SERPL-SCNC: 21 MMOL/L (ref 23–31)
CO2 SERPL-SCNC: 25 MMOL/L (ref 23–31)
CREAT SERPL-MCNC: 0.7 MG/DL (ref 0.55–1.02)
CREAT SERPL-MCNC: 0.74 MG/DL (ref 0.55–1.02)
CREAT/UREA NIT SERPL: 17
CREAT/UREA NIT SERPL: 19
ERYTHROCYTE [DISTWIDTH] IN BLOOD BY AUTOMATED COUNT: 12.3 % (ref 11.5–17)
GFR SERPLBLD CREATININE-BSD FMLA CKD-EPI: >60 ML/MIN/1.73/M2
GFR SERPLBLD CREATININE-BSD FMLA CKD-EPI: >60 ML/MIN/1.73/M2
GLUCOSE SERPL-MCNC: 116 MG/DL (ref 82–115)
GLUCOSE SERPL-MCNC: 98 MG/DL (ref 82–115)
HCT VFR BLD AUTO: 33.3 % (ref 37–47)
HGB BLD-MCNC: 11.2 G/DL (ref 12–16)
MCH RBC QN AUTO: 30.1 PG (ref 27–31)
MCHC RBC AUTO-ENTMCNC: 33.6 G/DL (ref 33–36)
MCV RBC AUTO: 89.5 FL (ref 80–94)
NRBC BLD AUTO-RTO: 0 %
PLATELET # BLD AUTO: 293 X10(3)/MCL (ref 130–400)
PMV BLD AUTO: 9.8 FL (ref 7.4–10.4)
POTASSIUM SERPL-SCNC: 4.1 MMOL/L (ref 3.5–5.1)
POTASSIUM SERPL-SCNC: 4.2 MMOL/L (ref 3.5–5.1)
RBC # BLD AUTO: 3.72 X10(6)/MCL (ref 4.2–5.4)
SODIUM SERPL-SCNC: 127 MMOL/L (ref 136–145)
SODIUM SERPL-SCNC: 131 MMOL/L (ref 136–145)
WBC # BLD AUTO: 12.02 X10(3)/MCL (ref 4.5–11.5)

## 2024-11-16 PROCEDURE — 36415 COLL VENOUS BLD VENIPUNCTURE: CPT | Performed by: INTERNAL MEDICINE

## 2024-11-16 PROCEDURE — 94799 UNLISTED PULMONARY SVC/PX: CPT

## 2024-11-16 PROCEDURE — 80048 BASIC METABOLIC PNL TOTAL CA: CPT | Performed by: INTERNAL MEDICINE

## 2024-11-16 PROCEDURE — 63600175 PHARM REV CODE 636 W HCPCS

## 2024-11-16 PROCEDURE — 25000003 PHARM REV CODE 250

## 2024-11-16 PROCEDURE — 25000003 PHARM REV CODE 250: Performed by: REHABILITATION UNIT

## 2024-11-16 PROCEDURE — 25000003 PHARM REV CODE 250: Performed by: NURSE PRACTITIONER

## 2024-11-16 PROCEDURE — 85027 COMPLETE CBC AUTOMATED: CPT

## 2024-11-16 PROCEDURE — 25000003 PHARM REV CODE 250: Performed by: INTERNAL MEDICINE

## 2024-11-16 PROCEDURE — 99900031 HC PATIENT EDUCATION (STAT)

## 2024-11-16 PROCEDURE — 97110 THERAPEUTIC EXERCISES: CPT

## 2024-11-16 PROCEDURE — 11000001 HC ACUTE MED/SURG PRIVATE ROOM

## 2024-11-16 PROCEDURE — 97535 SELF CARE MNGMENT TRAINING: CPT

## 2024-11-16 PROCEDURE — 94761 N-INVAS EAR/PLS OXIMETRY MLT: CPT

## 2024-11-16 RX ORDER — FAMOTIDINE 20 MG/1
40 TABLET, FILM COATED ORAL NIGHTLY
Status: DISCONTINUED | OUTPATIENT
Start: 2024-11-16 | End: 2024-11-18 | Stop reason: HOSPADM

## 2024-11-16 RX ORDER — FUROSEMIDE 40 MG/1
40 TABLET ORAL 3 TIMES DAILY
Status: DISCONTINUED | OUTPATIENT
Start: 2024-11-16 | End: 2024-11-16

## 2024-11-16 RX ORDER — SPIRONOLACTONE 25 MG/1
50 TABLET ORAL DAILY
Status: DISCONTINUED | OUTPATIENT
Start: 2024-11-16 | End: 2024-11-18 | Stop reason: HOSPADM

## 2024-11-16 RX ORDER — ACETAMINOPHEN 325 MG/1
650 TABLET ORAL EVERY 6 HOURS PRN
Status: DISCONTINUED | OUTPATIENT
Start: 2024-11-16 | End: 2024-11-18 | Stop reason: HOSPADM

## 2024-11-16 RX ORDER — FUROSEMIDE 40 MG/1
40 TABLET ORAL 2 TIMES DAILY
Status: DISCONTINUED | OUTPATIENT
Start: 2024-11-16 | End: 2024-11-16

## 2024-11-16 RX ORDER — CHOLECALCIFEROL (VITAMIN D3) 25 MCG
2000 TABLET ORAL DAILY
Status: DISCONTINUED | OUTPATIENT
Start: 2024-11-16 | End: 2024-11-18 | Stop reason: HOSPADM

## 2024-11-16 RX ORDER — ENOXAPARIN SODIUM 100 MG/ML
40 INJECTION SUBCUTANEOUS EVERY 24 HOURS
Status: DISCONTINUED | OUTPATIENT
Start: 2024-11-16 | End: 2024-11-18 | Stop reason: HOSPADM

## 2024-11-16 RX ORDER — PANTOPRAZOLE SODIUM 40 MG/1
40 TABLET, DELAYED RELEASE ORAL DAILY
Status: DISCONTINUED | OUTPATIENT
Start: 2024-11-16 | End: 2024-11-18 | Stop reason: HOSPADM

## 2024-11-16 RX ADMIN — METOPROLOL TARTRATE 25 MG: 25 TABLET, FILM COATED ORAL at 08:11

## 2024-11-16 RX ADMIN — SPIRONOLACTONE 50 MG: 25 TABLET ORAL at 02:11

## 2024-11-16 RX ADMIN — LOSARTAN POTASSIUM 100 MG: 50 TABLET, FILM COATED ORAL at 08:11

## 2024-11-16 RX ADMIN — DOCUSATE SODIUM 200 MG: 100 CAPSULE, LIQUID FILLED ORAL at 06:11

## 2024-11-16 RX ADMIN — KETOROLAC TROMETHAMINE 10 MG: 10 TABLET, FILM COATED ORAL at 06:11

## 2024-11-16 RX ADMIN — FAMOTIDINE 20 MG: 20 TABLET, FILM COATED ORAL at 08:11

## 2024-11-16 RX ADMIN — ONDANSETRON 4 MG: 2 INJECTION INTRAMUSCULAR; INTRAVENOUS at 07:11

## 2024-11-16 RX ADMIN — ONDANSETRON 4 MG: 2 INJECTION INTRAMUSCULAR; INTRAVENOUS at 12:11

## 2024-11-16 RX ADMIN — TRAMADOL HYDROCHLORIDE 50 MG: 50 TABLET, COATED ORAL at 12:11

## 2024-11-16 RX ADMIN — ESCITALOPRAM OXALATE 10 MG: 10 TABLET ORAL at 08:11

## 2024-11-16 RX ADMIN — KETOROLAC TROMETHAMINE 10 MG: 10 TABLET, FILM COATED ORAL at 12:11

## 2024-11-16 RX ADMIN — ASPIRIN 81 MG 81 MG: 81 TABLET ORAL at 08:11

## 2024-11-16 RX ADMIN — KETOROLAC TROMETHAMINE 10 MG: 10 TABLET, FILM COATED ORAL at 02:11

## 2024-11-16 RX ADMIN — SENNOSIDES AND DOCUSATE SODIUM 2 TABLET: 50; 8.6 TABLET ORAL at 08:11

## 2024-11-16 RX ADMIN — FUROSEMIDE 40 MG: 40 TABLET ORAL at 10:11

## 2024-11-16 RX ADMIN — SODIUM CHLORIDE 2000 MG: 1 TABLET ORAL at 08:11

## 2024-11-16 RX ADMIN — TRAMADOL HYDROCHLORIDE 50 MG: 50 TABLET, COATED ORAL at 06:11

## 2024-11-16 NOTE — CONSULTS
Hospital Medicine Consultation Note        Patient Name: Ivette Santillan  MRN: [id number]  Patient Class: OP- Observation   Admission Date: 11/14/2024  7:18 AM  Length of Stay: 1  Attending Physician: [unfilled]   Primary Care Provider: Tanya Lagunas MD  Face-to-Face encounter date: 11/16/2024 g  Consulting Physician: Beto Painter MD  Reason for Consult: medical management  Chief Complaint: No chief complaint on file.        Patient information was obtained from patient, patient's family, past medical records.    HISTORY OF PRESENT ILLNESS:   Ivette Santillan is a 64 y.o. female with  has a past medical history of Allergic rhinitis due to pollen, Anxiety disorder, unspecified, Arthritis, Depression, GERD (gastroesophageal reflux disease), HTN (hypertension), Mild intermittent asthma, uncomplicated, Obesity, unspecified, Personal history of colonic polyps, and Sleep apnea, unspecified..admitted under Dr. Enriquez on 11/14/2024 with the diagnosis of right shoulder osteoarthritis underwent reverseTSA and biceps tenodesis 11/14/24.  Pt complaint of some pain to shoulder as expected, +nausea.  Pt has hyponatremia, says she drank a lot of water leading up to surgery to hydrate for procedure, no confusion.  No f/c/cp/sob.  Hospital Medicine team was consulted for medical management     PAST MEDICAL HISTORY:     Past Medical History:   Diagnosis Date    Allergic rhinitis due to pollen     Anxiety disorder, unspecified     Arthritis     Depression     GERD (gastroesophageal reflux disease)     HTN (hypertension)     Mild intermittent asthma, uncomplicated     Obesity, unspecified     Personal history of colonic polyps     Sleep apnea, unspecified     cpap       PAST SURGICAL HISTORY:     Past Surgical History:   Procedure Laterality Date    APPENDECTOMY      BREAST LUMPECTOMY Bilateral     benign    COLONOSCOPY  06/01/2017    JOINT REPLACEMENT  2018 , 2019    Knee replacement Bilateral    REVERSE TOTAL SHOULDER  ARTHROPLASTY Right 11/14/2024    Procedure: ARTHROPLASTY, SHOULDER, TOTAL, REVERSE/ Biomet;  Surgeon: Jarrell Enriquez MD;  Location: [facility];  Service: Orthopedics;  Laterality: Right;       ALLERGIES:   Metoclopramide, Amoxicillin-pot clavulanate, Clindamycin, and Ibuprofen    FAMILY HISTORY:     Family History   Problem Relation Name Age of Onset    Heart failure Mother Mother, M Jarrell     Stroke Mother Mother, M Jarrell     Arthritis Mother Mother, M Jarrell     Heart disease Mother Mother, M Jarrell     Hypertension Mother Mother, M Jarrell     Cancer Father B Jarrell     Alcohol abuse Father B Jarrell     Heart failure Sister A BelgRd     Cancer Sister A BelgRd     Heart failure Brother D Jarrell     Cancer Brother D Jarrell     Early death Brother D Jarrell         SOCIAL HISTORY:     Social History     Tobacco Use    Smoking status: Never    Smokeless tobacco: Never   Substance Use Topics    Alcohol use: Never        HOME MEDICATIONS:     Prior to Admission medications    Medication Sig Start Date End Date Taking? Authorizing Provider   albuterol (PROVENTIL/VENTOLIN HFA) 90 mcg/actuation inhaler Inhale 2 puffs into the lungs every 4 (four) hours as needed for Wheezing. 7/17/24  Yes MIKHAIL Tan MD   ascorbic acid, vitamin C, (VITAMIN C) 1000 MG tablet Take 1,000 mg by mouth 2 (two) times daily.   Yes Provider, Historical   AUVI-Q 0.3 mg/0.3 mL AtIn Inject into the muscle as needed. 2/9/23  Yes Provider, Historical   azelastine (ASTELIN) 137 mcg (0.1 %) nasal spray 1 spray by Nasal route 2 (two) times daily.   Yes Provider, Historical   calcium carbonate/vitamin D3 (VITAMIN D-3 ORAL) Take 1 tablet by mouth Daily.   Yes Provider, Historical   dicyclomine (BENTYL) 10 MG capsule TAKE ONE TABLET EVERY 6 HOURS. AS NEEDED FOR CRAMPS 11/13/23  Yes Tanya Lagunas MD   EScitalopram oxalate (LEXAPRO) 10 MG tablet Take 1 tablet (10 mg total) by mouth once daily. 12/27/23  Yes Tanya Lagunas MD   ezetimibe (ZETIA) 10 mg tablet Take 10  mg by mouth once daily. 5/10/22  Yes Provider, Historical   famotidine (PEPCID) 20 MG tablet Take 2 tablets (40 mg total) by mouth every evening. 2 tablets at night 5/13/24  Yes Tanya Lagunas MD   fexofenadine (ALLEGRA) 180 MG tablet Take 180 mg by mouth once daily.   Yes Provider, Historical   fluticasone propionate (FLONASE) 50 mcg/actuation nasal spray 1 spray by Each Nostril route 2 (two) times a day.   Yes Provider, Historical   fluticasone-umeclidin-vilanter (TRELEGY ELLIPTA) 200-62.5-25 mcg inhaler INHALE ONE PUFF BY MOUTH ONCE DAILY  Patient taking differently: Inhale 1 puff into the lungs once daily. 10/8/24  Yes Sofía Pena, ABDULAZIZ   losartan-hydrochlorothiazide 100-25 mg (HYZAAR) 100-25 mg per tablet Take 1 tablet by mouth once daily. 5/30/22  Yes Provider, Historical   metoprolol tartrate (LOPRESSOR) 25 MG tablet Take 25 mg by mouth 2 (two) times daily. 10/18/24  Yes Provider, Historical   montelukast (SINGULAIR) 10 mg tablet Take 1 tablet (10 mg total) by mouth every evening. 10/1/24  Yes MIKHAIL Tan MD   multivitamin capsule Take 1 capsule by mouth once daily.   Yes Provider, Historical   mupirocin (BACTROBAN) 2 % ointment by Nasal route once daily. 11/7/24  Yes Jessica Kearney PA-C   NON FORMULARY MEDICATION 1 application  by subcutaneous (via wearable injector) route once a week. Allergy injections weekly   Yes Provider, Historical   omeprazole (PRILOSEC) 20 MG capsule Take 2 capsules (40 mg total) by mouth once daily. 11/5/24  Yes Tanya Lagunas MD   pravastatin (PRAVACHOL) 40 MG tablet Take 40 mg by mouth every evening. 5/10/22  Yes Provider, Historical   romosozumab-aqqg (EVENITY) 210mg/2.34mL ( 105mg/1.17mLx2) Inject 210 mg into the skin every 30 days.   Yes Provider, Historical   spironolactone (ALDACTONE) 100 MG tablet Take 1 tablet (100 mg total) by mouth once daily.  Patient taking differently: Take 50 mg by mouth once daily. 6/11/24  Yes Tanya Lagunas MD   zinc gluconate  "50 mg tablet Take 50 mg by mouth once daily.   Yes Provider, Historical   aspirin (ECOTRIN) 81 MG EC tablet Take 1 tablet (81 mg total) by mouth once daily. Increase to twice a day for 4 weeks post-op for blood clot prevention. 11/15/24   Sammie Eric FNP   benralizumab (FASENRA) 30 mg/mL Syrg injection Inject 30 mg into the skin every 8 weeks.    Provider, Historical   cefadroxil (DURICEF) 500 MG Cap Take 1 capsule (500 mg total) by mouth every 12 (twelve) hours. for 7 days 11/15/24 11/22/24  Sammie Eric FNP   HYDROcodone-acetaminophen (NORCO) 5-325 mg per tablet Take 1 tablet by mouth every 4 (four) hours as needed for Pain. 11/15/24 11/22/24  Sammie Eric FNP   ketorolac (TORADOL) 10 mg tablet Take 1 tablet (10 mg total) by mouth every 8 (eight) hours. for 5 days 11/15/24 11/20/24  Sammie Eric FNP   methocarbamoL (ROBAXIN) 750 MG Tab Take 1 tablet (750 mg total) by mouth every 6 (six) hours as needed (muscle spasms). 11/15/24 11/29/24  Sammie Eric FNP   ondansetron (ZOFRAN-ODT) 4 MG TbDL Take 1 tablet (4 mg total) by mouth every 6 (six) hours as needed (nausea/vomiting). 11/15/24 11/30/24  Sammie Eric FNP   polyethylene glycol (GLYCOLAX) 17 gram PwPk Take 17 g by mouth once daily. Constipation PREVENTION for 14 days 11/15/24 11/29/24  Sammie Eric FNP   sodium chloride 1,000 mg TbSO oral tablet Take 2 tablets (2,000 mg total) by mouth 2 (two) times a day. for 3 days 11/15/24 11/18/24  Sammie Eric FNP       REVIEW OF SYSTEMS:   Review of Systems   All other systems reviewed and are negative.       PHYSICAL EXAM:   /75   Pulse 68   Temp 97.7 °F (36.5 °C) (Oral)   Resp 19   Ht 5' 2" (1.575 m)   Wt 96.2 kg (212 lb 1.3 oz)   SpO2 96%   Breastfeeding No   BMI 38.79 kg/m²     Physical Exam  Constitutional:       Appearance: She is obese.   Eyes:      Extraocular Movements: Extraocular movements intact.      Pupils: Pupils are equal, round, and reactive to light. "   Cardiovascular:      Rate and Rhythm: Normal rate and regular rhythm.      Heart sounds: Normal heart sounds.   Pulmonary:      Breath sounds: Normal breath sounds. No wheezing.   Abdominal:      General: Bowel sounds are normal. There is no distension.      Palpations: Abdomen is soft.      Tenderness: There is no abdominal tenderness.   Musculoskeletal:      Cervical back: Neck supple.      Right lower leg: Edema (trace-1+) present.      Left lower leg: Edema (trace-1+) present.      Comments: Right shoulder sling, incision bandaged, bruising to arm/shoulder, tenderness   Neurological:      General: No focal deficit present.      Mental Status: She is alert.   Psychiatric:         Mood and Affect: Mood normal.         Thought Content: Thought content normal.          LABS AND IMAGING:     Admission on 11/14/2024   Component Date Value Ref Range Status    POCT Glucose 11/14/2024 93  70 - 110 mg/dL Final    Sodium 11/15/2024 127 (L)  136 - 145 mmol/L Final    Potassium 11/15/2024 4.0  3.5 - 5.1 mmol/L Final    Chloride 11/15/2024 95 (L)  98 - 107 mmol/L Final    CO2 11/15/2024 21 (L)  23 - 31 mmol/L Final    Glucose 11/15/2024 121 (H)  82 - 115 mg/dL Final    Blood Urea Nitrogen 11/15/2024 15.1  9.8 - 20.1 mg/dL Final    Creatinine 11/15/2024 0.82  0.55 - 1.02 mg/dL Final    BUN/Creatinine Ratio 11/15/2024 18   Final    Calcium 11/15/2024 8.4  8.4 - 10.2 mg/dL Final    Anion Gap 11/15/2024 11.0  mEq/L Final    eGFR 11/15/2024 >60  mL/min/1.73/m2 Final    WBC 11/15/2024 15.70 (H)  4.50 - 11.50 x10(3)/mcL Final    RBC 11/15/2024 3.49 (L)  4.20 - 5.40 x10(6)/mcL Final    Hgb 11/15/2024 10.7 (L)  12.0 - 16.0 g/dL Final    Hct 11/15/2024 31.6 (L)  37.0 - 47.0 % Final    MCV 11/15/2024 90.5  80.0 - 94.0 fL Final    MCH 11/15/2024 30.7  27.0 - 31.0 pg Final    MCHC 11/15/2024 33.9  33.0 - 36.0 g/dL Final    RDW 11/15/2024 12.5  11.5 - 17.0 % Final    Platelet 11/15/2024 305  130 - 400 x10(3)/mcL Final    MPV 11/15/2024  9.9  7.4 - 10.4 fL Final    NRBC% 11/15/2024 0.0  % Final    Sodium 11/15/2024 125 (L)  136 - 145 mmol/L Final    Potassium 11/15/2024 4.3  3.5 - 5.1 mmol/L Final    Chloride 11/15/2024 92 (L)  98 - 107 mmol/L Final    CO2 11/15/2024 22 (L)  23 - 31 mmol/L Final    Glucose 11/15/2024 125 (H)  82 - 115 mg/dL Final    Blood Urea Nitrogen 11/15/2024 16.4  9.8 - 20.1 mg/dL Final    Creatinine 11/15/2024 0.75  0.55 - 1.02 mg/dL Final    BUN/Creatinine Ratio 11/15/2024 22   Final    Calcium 11/15/2024 8.3 (L)  8.4 - 10.2 mg/dL Final    Anion Gap 11/15/2024 11.0  mEq/L Final    eGFR 11/15/2024 >60  mL/min/1.73/m2 Final    Urine Osmolality 11/15/2024 196 (L)  300 - 1,300 mOsm/kg Final    WBC 11/16/2024 12.02 (H)  4.50 - 11.50 x10(3)/mcL Final    RBC 11/16/2024 3.72 (L)  4.20 - 5.40 x10(6)/mcL Final    Hgb 11/16/2024 11.2 (L)  12.0 - 16.0 g/dL Final    Hct 11/16/2024 33.3 (L)  37.0 - 47.0 % Final    MCV 11/16/2024 89.5  80.0 - 94.0 fL Final    MCH 11/16/2024 30.1  27.0 - 31.0 pg Final    MCHC 11/16/2024 33.6  33.0 - 36.0 g/dL Final    RDW 11/16/2024 12.3  11.5 - 17.0 % Final    Platelet 11/16/2024 293  130 - 400 x10(3)/mcL Final    MPV 11/16/2024 9.8  7.4 - 10.4 fL Final    NRBC% 11/16/2024 0.0  % Final    Sodium 11/16/2024 127 (L)  136 - 145 mmol/L Final    Potassium 11/16/2024 4.2  3.5 - 5.1 mmol/L Final    Chloride 11/16/2024 97 (L)  98 - 107 mmol/L Final    CO2 11/16/2024 21 (L)  23 - 31 mmol/L Final    Glucose 11/16/2024 98  82 - 115 mg/dL Final    Blood Urea Nitrogen 11/16/2024 13.3  9.8 - 20.1 mg/dL Final    Creatinine 11/16/2024 0.70  0.55 - 1.02 mg/dL Final    BUN/Creatinine Ratio 11/16/2024 19   Final    Calcium 11/16/2024 8.4  8.4 - 10.2 mg/dL Final    Anion Gap 11/16/2024 9.0  mEq/L Final    eGFR 11/16/2024 >60  mL/min/1.73/m2 Final        X-Ray Chest PA And Lateral    Result Date: 10/30/2024  EXAMINATION: XR CHEST PA AND LATERAL CLINICAL HISTORY: , Primary osteoarthritis, right shoulder. COMPARISON: April 20,  2020 FINDINGS: No alveolar consolidation, effusion, or pneumothorax is seen.   The thoracic aorta is normal  cardiac silhouette, central pulmonary vessels and mediastinum are normal in size and are grossly unremarkable.   visualized osseous structures are grossly unremarkable.     No acute chest disease is identified. Electronically signed by: Antolin San Date:    10/30/2024 Time:    10:43       Microbiology Results (last 7 days)       ** No results found for the last 168 hours. **             ASSESSMENT & PLAN:   Hyponatremia  Right shoulder osteoarthritis/reverse TSA/biceps tenodesis 11/14  Hx htn, asthma    Plan    1L fluid restriction, nacl 2g tid  Lasix 40mg bid  Labs at 4pm,am  Resume home meds  Refer to ortho recs  PTOT  Pain control, take meds on full stomach, add tylenol  Zofran prn nausea       DVT: Prophylaxis: Lovenox   GI Prophylaxis:  Protonix/Pepcid  Addendum:  Na 131, dc lasix/nacl, cont no excessive water intake, cont home meds, add dietary salt.  Bmp next week with pcp.  __________________________________________________________________________  INPATIENT LIST OF MEDICATIONS     Current Facility-Administered Medications:     albuterol inhaler 2 puff, 2 puff, Inhalation, Q4H PRN, Jeaneth, Sammie L, FNP    aluminum-magnesium hydroxide-simethicone 200-200-20 mg/5 mL suspension 30 mL, 30 mL, Oral, Q6H PRN, [place], Jessica, PA-C    aspirin chewable tablet 81 mg, 81 mg, Oral, Daily, [place], Jessica, PA-C, 81 mg at 11/16/24 0844    [START ON 11/17/2024] bisacodyL suppository 20 mg, 20 mg, Rectal, Daily, [place], Jessica, PA-C    docusate sodium capsule 200 mg, 200 mg, Oral, Before breakfast, [place], Jessica, PA-C, 200 mg at 11/16/24 0614    EScitalopram oxalate tablet 10 mg, 10 mg, Oral, Daily, Jeaneth, Sammie L, FNP, 10 mg at 11/16/24 0844    famotidine tablet 20 mg, 20 mg, Oral, BID, Jarrell Enriquez MD, 20 mg at 11/16/24 0844    fluticasone furoate 200 mcg/actuation inhaler 1 puff, 1 puff, Inhalation,  Daily **AND** umeclidinium-vilanteroL 62.5-25 mcg/actuation DsDv 1 puff, 1 puff, Inhalation, Daily, Jarrell Enriquez MD    furosemide tablet 40 mg, 40 mg, Oral, TID, Beto Painter MD, 40 mg at 11/16/24 1005    gabapentin capsule 300 mg, 300 mg, Oral, QHS, Caio, Jessica, PA-C, 300 mg at 11/15/24 2155    HYDROcodone-acetaminophen 5-325 mg per tablet 1 tablet, 1 tablet, Oral, Q4H PRN, Caio, Jessica, PA-C    ketorolac tablet 10 mg, 10 mg, Oral, Q6H, Caio, Jessica, PA-C, 10 mg at 11/16/24 0614    losartan tablet 100 mg, 100 mg, Oral, Daily, 100 mg at 11/16/24 0843 **AND** [DISCONTINUED] hydroCHLOROthiazide tablet 25 mg, 25 mg, Oral, Daily, Jarrell Enriquez MD    magnesium hydroxide 400 mg/5 ml suspension 2,400 mg, 30 mL, Oral, Daily PRN, [place], Jessica, PA-C    melatonin tablet 6 mg, 6 mg, Oral, Nightly PRN, [place], Jessica, PA-C    methocarbamoL tablet 750 mg, 750 mg, Oral, Q8H PRN, [place], Jessica, PA-C    metoprolol tartrate (LOPRESSOR) tablet 25 mg, 25 mg, Oral, BID, Jeaneth, Sammie L, FNP, 25 mg at 11/16/24 0844    morphine injection 4 mg, 4 mg, Intravenous, Q3H PRN, Caio, Jessica, PA-C    ondansetron injection 4 mg, 4 mg, Intravenous, Q6H PRN, [place], Jessica, PA-C, 4 mg at 11/16/24 0710    polyethylene glycol packet 17 g, 17 g, Oral, QHS, [place], Jessica, PA-C, 17 g at 11/14/24 2116    senna-docusate 8.6-50 mg per tablet 2 tablet, 2 tablet, Oral, BID, Caio, Jessica, PA-C, 2 tablet at 11/16/24 0843    sodium chloride oral tablet 2,000 mg, 2,000 mg, Oral, BID, Beto Painter MD, 2,000 mg at 11/16/24 0843    traMADoL tablet 50 mg, 50 mg, Oral, Q4H PRN, Jessica Kearney PA-C, 50 mg at 11/16/24 0614      Scheduled Meds:   aspirin  81 mg Oral Daily    [START ON 11/17/2024] bisacodyL  20 mg Rectal Daily    docusate sodium  200 mg Oral Before breakfast    EScitalopram oxalate  10 mg Oral Daily    famotidine  20 mg Oral BID    fluticasone furoate  1 puff Inhalation Daily    And    umeclidinium-vilanteroL  1 puff  Inhalation Daily    furosemide  40 mg Oral TID    gabapentin  300 mg Oral QHS    ketorolac  10 mg Oral Q6H    losartan  100 mg Oral Daily    metoprolol tartrate  25 mg Oral BID    polyethylene glycol  17 g Oral QHS    senna-docusate 8.6-50 mg  2 tablet Oral BID    sodium chloride  2,000 mg Oral BID     Continuous Infusions:  PRN Meds:.  Current Facility-Administered Medications:     albuterol, 2 puff, Inhalation, Q4H PRN    aluminum-magnesium hydroxide-simethicone, 30 mL, Oral, Q6H PRN    HYDROcodone-acetaminophen, 1 tablet, Oral, Q4H PRN    magnesium hydroxide 400 mg/5 ml, 30 mL, Oral, Daily PRN    melatonin, 6 mg, Oral, Nightly PRN    methocarbamoL, 750 mg, Oral, Q8H PRN    morphine, 4 mg, Intravenous, Q3H PRN    ondansetron, 4 mg, Intravenous, Q6H PRN    traMADoL, 50 mg, Oral, Q4H PRN    ______________________________________________________________________________    Thank you for the consult, will be happy to follow alongside you      All diagnosis and differential diagnosis have been reviewed; assessment and plan has been documented; I have personally reviewed the labs and test results that are presently available; I have reviewed the patients medication list; I have reviewed the consulting providers response and recommendations. I have reviewed or attempted to review medical records based upon their availability.    All of the patient and family questions have been addressed and answered. Patient's is agreeable to the above stated plan. I will continue to monitor closely and make adjustments to medical management as needed.    Beto Painter MD   11/16/2024

## 2024-11-16 NOTE — PT/OT/SLP PROGRESS
Occupational Therapy   Treatment    Name: Ivette Santillan  MRN: [id number]  Admitting Diagnosis:  Osteoarthritis of right shoulder  2 Days Post-Op    Recommendations:     Discharge Recommendations: Low Intensity Therapy  Discharge Equipment Recommendations:  none  Barriers to discharge:   none    Assessment:     Ivette Santillan is a 64 y.o. female with a medical diagnosis of Osteoarthritis of right shoulder.  She presents with functional decline and impaired occupational performance. Performance deficits affecting function are weakness, impaired endurance, impaired sensation, impaired self care skills, impaired functional mobility, decreased upper extremity function, pain, impaired fine motor, impaired coordination, edema, impaired skin, orthopedic precautions.     Rehab Prognosis:  Good; patient would benefit from acute skilled OT services to address these deficits and reach maximum level of function.       Plan:     Patient to be seen daily (BID) to address the above listed problems via self-care/home management, therapeutic activities, therapeutic exercises  Plan of Care Expires: 11/20/24  Plan of Care Reviewed with: spouse, patient    Subjective     Chief Complaint: none  Patient/Family Comments/goals: return home  Pain/Comfort:  Pain Rating 1: 0/10    Objective:     Communicated with: nurse prior to session. Patient found up in chair with peripheral IV upon OT entry to room.    General Precautions: Standard, fall    Orthopedic Precautions:RUE non weight bearing  Braces: Shoulder abduction brace  Respiratory Status: Room air     Occupational Performance:     Functional Mobility/Transfers:  Patient completed Bed <> Chair Transfer using Step Transfer technique with supervision with no assistive device  Patient completed Toilet Transfer Step Transfer technique with supervision with  no AD    Activities of Daily Living:  Upper Body Dressing: supervision to don sling  Toileting: supervision for safety    Therapeutic  Exercise:   2 x 10 wrist, digits AROM  Practice pendulums 1 x 5    Treatment & Education:  Pt. educated on OT goals, shoulder protocol, HEP, POC, orientation to environment, use of call bell for assist with transfers OOB or for any other needs due to fall risk.    Patient left up in chair with all lines intact, call button in reach, nurse notified, and  present    GOALS:   Multidisciplinary Problems       Occupational Therapy Goals          Problem: Occupational Therapy    Goal Priority Disciplines Outcome Interventions   Occupational Therapy Goal     OT, PT/OT Progressing    Description: Pt will perform UB dress c Touch assist using hemitechniques by d/c.   Pt will perform LB dress c Touch assist using hemitechniques by d/c.  Pt will perform toilet t/f using LRAD c SBA assist by d/c.  Pt will perform toileting c SBA assist by d/c.   Pt will perform Walk in shower t/f c standby assist by d/c. MET  Pt will be Ind c HEP and pxns Compliance by d/c.                         Time Tracking:     OT Date of Treatment: 11/16/24  OT Start Time: 1100  OT Stop Time: 1145  OT Total Time (min): 45 min    Billable Minutes:Self Care/Home Management 30  Therapeutic Exercise 15      11/16/2024

## 2024-11-16 NOTE — PLAN OF CARE
Problem: Adult Inpatient Plan of Care  Goal: Plan of Care Review  Outcome: Progressing  Goal: Patient-Specific Goal (Individualized)  Outcome: Progressing  Goal: Absence of Hospital-Acquired Illness or Injury  Outcome: Progressing  Goal: Optimal Comfort and Wellbeing  Outcome: Progressing  Goal: Readiness for Transition of Care  Outcome: Progressing     Problem: Wound  Goal: Optimal Coping  Outcome: Progressing  Goal: Optimal Functional Ability  Outcome: Progressing  Goal: Absence of Infection Signs and Symptoms  Outcome: Progressing  Goal: Improved Oral Intake  Outcome: Progressing  Goal: Optimal Pain Control and Function  Outcome: Progressing  Goal: Skin Health and Integrity  Outcome: Progressing  Goal: Optimal Wound Healing  Outcome: Progressing     Problem: Infection  Goal: Absence of Infection Signs and Symptoms  Outcome: Progressing     Problem: Shoulder Arthroplasty (Total, Bright, Reverse)  Goal: Optimal Coping  Outcome: Progressing  Goal: Absence of Bleeding  Outcome: Progressing  Goal: Effective Bowel Elimination  Outcome: Progressing  Goal: Fluid and Electrolyte Balance  Outcome: Progressing  Goal: Optimal Functional Ability  Outcome: Progressing  Goal: Absence of Infection Signs and Symptoms  Outcome: Progressing  Goal: Intact Neurovascular Status  Outcome: Progressing  Goal: Anesthesia/Sedation Recovery  Outcome: Progressing  Goal: Acceptable Pain Control  Outcome: Progressing  Goal: Nausea and Vomiting Relief  Outcome: Progressing  Goal: Effective Urinary Elimination  Outcome: Progressing     Problem: Fall Injury Risk  Goal: Absence of Fall and Fall-Related Injury  Outcome: Progressing

## 2024-11-16 NOTE — NURSING
RX: scripts given to patient/family  Supplies: Coverlet, TEDs, ACE, Nozin    Educated on post op instructions, home care, f/u, meds., therapy, wound care, complication prevention, when to seek medical attention, ect. See AVS for specifics.     Questions/concerns addressed. Stable and ready for discharge.     Nurse Note:       11/16/2024   5:24 PM      Perception of Care - Joint Replacement Population Total Joint Surgery List: SHOULDER    Patient able to verbalize one way to treat/prevent SWELLING at home   [x] Yes   [] No   [] Further Education Provided        Attending Nurse:  Kendal

## 2024-11-16 NOTE — PROGRESS NOTES
[facility] - Orthopaedics  Orthopedics  Progress Note    Patient Name: Ivette Santillan  MRN: [id number]  Admission Date: 11/14/2024  Hospital Length of Stay: 1 days  Attending Provider: Jarrell Enriquez MD  Primary Care Provider: Tanya Lagunas MD  Follow-up For: Procedure(s) (LRB):  ARTHROPLASTY, SHOULDER, TOTAL, REVERSE/ Biomet (Right)    Post-Operative Day: 1 Day Post-Op  Subjective:     Principal Problem:Osteoarthritis of right shoulder    Interval History:   POD#2 R rTSA    Doing well. Sitting in bedside chair. Pain controlled. Ambulating. Block wearing off. Ice packs on R shoulder and in sling    Review of patient's allergies indicates:   Allergen Reactions    Metoclopramide      Other reaction(s): anxiety    Amoxicillin-pot clavulanate      Other reaction(s): rash    Clindamycin      Other reaction(s): nausea, vomiting    Ibuprofen      Other reaction(s): rash       Current Facility-Administered Medications   Medication    albuterol inhaler 2 puff    aluminum-magnesium hydroxide-simethicone 200-200-20 mg/5 mL suspension 30 mL    aspirin chewable tablet 81 mg    [START ON 11/17/2024] bisacodyL suppository 20 mg    docusate sodium capsule 200 mg    EScitalopram oxalate tablet 10 mg    famotidine tablet 20 mg    fluticasone furoate 200 mcg/actuation inhaler 1 puff    And    umeclidinium-vilanteroL 62.5-25 mcg/actuation DsDv 1 puff    furosemide tablet 40 mg    gabapentin capsule 300 mg    HYDROcodone-acetaminophen 5-325 mg per tablet 1 tablet    ketorolac tablet 10 mg    losartan tablet 100 mg    magnesium hydroxide 400 mg/5 ml suspension 2,400 mg    melatonin tablet 6 mg    methocarbamoL tablet 750 mg    metoprolol tartrate (LOPRESSOR) tablet 25 mg    morphine injection 4 mg    ondansetron injection 4 mg    polyethylene glycol packet 17 g    senna-docusate 8.6-50 mg per tablet 2 tablet    sodium chloride oral tablet 2,000 mg    traMADoL tablet 50 mg     Objective:     Vital Signs (Most  "Recent):  Temp: 97.7 °F (36.5 °C) (11/16/24 0720)  Pulse: 68 (11/16/24 0720)  Resp: 19 (11/16/24 0614)  BP: 127/75 (11/16/24 0720)  SpO2: 96 % (11/16/24 0937) Vital Signs (24h Range):  Temp:  [97.4 °F (36.3 °C)-98.6 °F (37 °C)] 97.7 °F (36.5 °C)  Pulse:  [64-89] 68  Resp:  [18-21] 19  SpO2:  [92 %-96 %] 96 %  BP: (105-149)/(55-75) 127/75     Weight: 96.2 kg (212 lb 1.3 oz)  Height: 5' 2" (157.5 cm)  Body mass index is 38.79 kg/m².      Intake/Output Summary (Last 24 hours) at 11/16/2024 1021  Last data filed at 11/16/2024 0900  Gross per 24 hour   Intake 842 ml   Output --   Net 842 ml     Gen: nad  Pulm: unlabored on RA  CV: well perfused  Abd: nondistended    RUE  Dressing c/d/I  Ecchymosis about shoulder  Sling in place  4/5 ain/pin/uln  Well perfused  Left Hand m/r/u    Significant Labs: BMP:   Recent Labs   Lab 11/16/24  0642   *   K 4.2   CL 97*   CO2 21*   BUN 13.3   CREATININE 0.70   CALCIUM 8.4     CBC:   Recent Labs   Lab 11/15/24  0456 11/16/24  0642   WBC 15.70* 12.02*   HGB 10.7* 11.2*   HCT 31.6* 33.3*    293     All pertinent labs within the past 24 hours have been reviewed.    Significant Imaging: I have reviewed all pertinent imaging results/findings.    Assessment/Plan:     Active Diagnoses:    Diagnosis Date Noted POA    PRINCIPAL PROBLEM:  Osteoarthritis of right shoulder [M19.011] 11/14/2024 Yes    Hyponatremia [E87.1] 11/15/2024 No      Problems Resolved During this Admission:     POD#2 R rTSA    Doing well. Pain control. Mobilize. DVT ppx -  aspirin. Low sodium, hospitalist consulted. Sodium tablets started will recheck BMP and d/c possibly home today if level >/= 130.  F/u in 2 weeks for wound check, XR, and to start therapy.     ABDULAZIZ Kan  Orthopedics  [facility] - Orthopaedics  "

## 2024-11-16 NOTE — PLAN OF CARE
Problem: Adult Inpatient Plan of Care  Goal: Plan of Care Review  Outcome: Met  Goal: Patient-Specific Goal (Individualized)  Outcome: Met  Goal: Absence of Hospital-Acquired Illness or Injury  Outcome: Met  Goal: Optimal Comfort and Wellbeing  Outcome: Met  Goal: Readiness for Transition of Care  Outcome: Met     Problem: Wound  Goal: Optimal Coping  Outcome: Met  Goal: Optimal Functional Ability  Outcome: Met  Goal: Absence of Infection Signs and Symptoms  Outcome: Met  Goal: Improved Oral Intake  Outcome: Met  Goal: Optimal Pain Control and Function  Outcome: Met  Goal: Skin Health and Integrity  Outcome: Met  Goal: Optimal Wound Healing  Outcome: Met     Problem: Infection  Goal: Absence of Infection Signs and Symptoms  Outcome: Met     Problem: Shoulder Arthroplasty (Total, Bright, Reverse)  Goal: Optimal Coping  Outcome: Met  Goal: Absence of Bleeding  Outcome: Met  Goal: Effective Bowel Elimination  Outcome: Met  Goal: Fluid and Electrolyte Balance  Outcome: Met  Goal: Optimal Functional Ability  Outcome: Met  Goal: Absence of Infection Signs and Symptoms  Outcome: Met  Goal: Intact Neurovascular Status  Outcome: Met  Goal: Anesthesia/Sedation Recovery  Outcome: Met  Goal: Acceptable Pain Control  Outcome: Met  Goal: Nausea and Vomiting Relief  Outcome: Met  Goal: Effective Urinary Elimination  Outcome: Met     Problem: Fall Injury Risk  Goal: Absence of Fall and Fall-Related Injury  Outcome: Met     Problem: Comorbidity Management  Goal: Maintenance of Behavioral Health Symptom Control  Outcome: Met  Goal: Blood Pressure in Desired Range  Outcome: Met     Problem: Gas Exchange Impaired  Goal: Optimal Gas Exchange  Outcome: Met

## 2024-11-17 PROCEDURE — 11000001 HC ACUTE MED/SURG PRIVATE ROOM

## 2024-11-18 ENCOUNTER — TELEPHONE (OUTPATIENT)
Dept: PRIMARY CARE CLINIC | Facility: CLINIC | Age: 64
End: 2024-11-18
Payer: MEDICARE

## 2024-11-18 DIAGNOSIS — E87.1 LOW SODIUM LEVELS: Primary | ICD-10-CM

## 2024-11-18 NOTE — TELEPHONE ENCOUNTER
Pt was in hospital for surgery, sodium was low and hospitalist suggested increase salt in diet and have PCP order BMP, I will place order

## 2024-11-18 NOTE — TELEPHONE ENCOUNTER
----- Message from [place] sent at 11/18/2024  8:34 AM CST -----  Who Called: Ivette Santillan    Caller is requesting assistance/information from provider's office.    Symptoms (please be specific):    How long has patient had these symptoms:    List of preferred pharmacies on file (remove unneeded): [unfilled]  If different, enter pharmacy into here including location and phone number:       Preferred Method of Contact: Phone Call  Patient's Preferred Phone Number on File: [phone]   Best Call Back Number, if different:  Additional Information: pt would like to speak with nurse about low  sodium

## 2024-11-19 ENCOUNTER — TELEPHONE (OUTPATIENT)
Dept: ORTHOPEDICS | Facility: CLINIC | Age: 64
End: 2024-11-19
Payer: MEDICARE

## 2024-11-19 ENCOUNTER — LAB VISIT (OUTPATIENT)
Dept: LAB | Facility: HOSPITAL | Age: 64
End: 2024-11-19
Attending: STUDENT IN AN ORGANIZED HEALTH CARE EDUCATION/TRAINING PROGRAM
Payer: MEDICARE

## 2024-11-19 DIAGNOSIS — E87.1 LOW SODIUM LEVELS: ICD-10-CM

## 2024-11-19 LAB
ANION GAP SERPL CALC-SCNC: 7 MEQ/L
BUN SERPL-MCNC: 10.8 MG/DL (ref 9.8–20.1)
CALCIUM SERPL-MCNC: 9.1 MG/DL (ref 8.4–10.2)
CHLORIDE SERPL-SCNC: 96 MMOL/L (ref 98–107)
CO2 SERPL-SCNC: 25 MMOL/L (ref 23–31)
CREAT SERPL-MCNC: 0.66 MG/DL (ref 0.55–1.02)
CREAT/UREA NIT SERPL: 16
GFR SERPLBLD CREATININE-BSD FMLA CKD-EPI: >60 ML/MIN/1.73/M2
GLUCOSE SERPL-MCNC: 100 MG/DL (ref 82–115)
POTASSIUM SERPL-SCNC: 4.5 MMOL/L (ref 3.5–5.1)
SODIUM SERPL-SCNC: 128 MMOL/L (ref 136–145)

## 2024-11-19 PROCEDURE — 80048 BASIC METABOLIC PNL TOTAL CA: CPT

## 2024-11-19 PROCEDURE — 36415 COLL VENOUS BLD VENIPUNCTURE: CPT

## 2024-11-19 NOTE — TELEPHONE ENCOUNTER
Ortho post op follow up call completed. (R total right shoulder). Pt states she will start therapy after her post op appointment. Reminded post op with [facility] is 12-2 at 9:45. Pt states her sodium level was low when she was discharged, so she had levels drawn this morning. No results yet. Encouraged to call with any questions or concerns.

## 2024-11-20 ENCOUNTER — TELEPHONE (OUTPATIENT)
Dept: PRIMARY CARE CLINIC | Facility: CLINIC | Age: 64
End: 2024-11-20
Payer: MEDICARE

## 2024-11-20 DIAGNOSIS — E87.1 HYPONATREMIA: Primary | ICD-10-CM

## 2024-11-20 NOTE — TELEPHONE ENCOUNTER
----- Message from Rosa sent at 11/20/2024  9:43 AM CST -----  Regarding: update  .Type:  Needs Medical Advice    Who Called: pt  Symptoms (please be specific): sodium low   Would the patient rather a call back or a response via [facility]? johnnie  Best Call Back Number:  [phone]  Additional Information:

## 2024-11-20 NOTE — TELEPHONE ENCOUNTER
Pt calling asking for results of recent labs for low sodium please advise, states she was given sodium tablets but it makes her sick

## 2024-11-21 DIAGNOSIS — M19.011 PRIMARY OSTEOARTHRITIS OF RIGHT SHOULDER: ICD-10-CM

## 2024-11-21 RX ORDER — HYDROCODONE BITARTRATE AND ACETAMINOPHEN 5; 325 MG/1; MG/1
1 TABLET ORAL EVERY 6 HOURS PRN
Qty: 28 TABLET | Refills: 0 | Status: SHIPPED | OUTPATIENT
Start: 2024-11-21 | End: 2024-11-28

## 2024-11-21 NOTE — TELEPHONE ENCOUNTER
Patient lvm asking for refills on ketorolac.     Patient lvm correcting her request stating she needed a refill on her muscle relaxer and norco.     Spoke with patient, verified that she was asking for a refill on the ketorolac and the norco. I explained the reasons why we do not refill the ketorolac, patient voiced understanding and was okay with switching to OTC NSAIDS. Patient did say she was going to run out of norco by the weekend though and asked if we could refill that. I let patient know request would be sent to [facility] and once she signs off on it that her pharmacy will let her know when it is ready for . Patient voiced understanding and had no further questions.

## 2024-11-26 ENCOUNTER — LAB VISIT (OUTPATIENT)
Dept: LAB | Facility: HOSPITAL | Age: 64
End: 2024-11-26
Attending: STUDENT IN AN ORGANIZED HEALTH CARE EDUCATION/TRAINING PROGRAM
Payer: MEDICARE

## 2024-11-26 DIAGNOSIS — E87.1 HYPONATREMIA: ICD-10-CM

## 2024-11-26 DIAGNOSIS — I10 PRIMARY HYPERTENSION: ICD-10-CM

## 2024-11-26 LAB
ALBUMIN SERPL-MCNC: 3.6 G/DL (ref 3.4–4.8)
ALBUMIN/GLOB SERPL: 1.4 RATIO (ref 1.1–2)
ALP SERPL-CCNC: 119 UNIT/L (ref 40–150)
ALT SERPL-CCNC: 27 UNIT/L (ref 0–55)
ANION GAP SERPL CALC-SCNC: 8 MEQ/L
AST SERPL-CCNC: 20 UNIT/L (ref 5–34)
BILIRUB SERPL-MCNC: 0.4 MG/DL
BUN SERPL-MCNC: 13 MG/DL (ref 9.8–20.1)
CALCIUM SERPL-MCNC: 9.8 MG/DL (ref 8.4–10.2)
CHLORIDE SERPL-SCNC: 102 MMOL/L (ref 98–107)
CO2 SERPL-SCNC: 27 MMOL/L (ref 23–31)
CREAT SERPL-MCNC: 0.73 MG/DL (ref 0.55–1.02)
CREAT/UREA NIT SERPL: 18
GFR SERPLBLD CREATININE-BSD FMLA CKD-EPI: >60 ML/MIN/1.73/M2
GLOBULIN SER-MCNC: 2.6 GM/DL (ref 2.4–3.5)
GLUCOSE SERPL-MCNC: 98 MG/DL (ref 82–115)
POTASSIUM SERPL-SCNC: 4.2 MMOL/L (ref 3.5–5.1)
PROT SERPL-MCNC: 6.2 GM/DL (ref 5.8–7.6)
SODIUM SERPL-SCNC: 137 MMOL/L (ref 136–145)

## 2024-11-26 PROCEDURE — 80053 COMPREHEN METABOLIC PANEL: CPT

## 2024-11-26 PROCEDURE — 36415 COLL VENOUS BLD VENIPUNCTURE: CPT

## 2024-12-02 ENCOUNTER — OFFICE VISIT (OUTPATIENT)
Dept: ORTHOPEDICS | Facility: CLINIC | Age: 64
End: 2024-12-02
Payer: MEDICARE

## 2024-12-02 ENCOUNTER — HOSPITAL ENCOUNTER (OUTPATIENT)
Dept: RADIOLOGY | Facility: CLINIC | Age: 64
Discharge: HOME | End: 2024-12-02
Payer: MEDICARE

## 2024-12-02 VITALS
SYSTOLIC BLOOD PRESSURE: 136 MMHG | DIASTOLIC BLOOD PRESSURE: 77 MMHG | WEIGHT: 212.06 LBS | BODY MASS INDEX: 39.02 KG/M2 | HEIGHT: 62 IN | HEART RATE: 74 BPM

## 2024-12-02 DIAGNOSIS — M19.011 OSTEOARTHRITIS OF RIGHT SHOULDER, UNSPECIFIED OSTEOARTHRITIS TYPE: Primary | ICD-10-CM

## 2024-12-02 DIAGNOSIS — G89.18 POST-OP PAIN: ICD-10-CM

## 2024-12-02 DIAGNOSIS — M19.011 OSTEOARTHRITIS OF RIGHT SHOULDER, UNSPECIFIED OSTEOARTHRITIS TYPE: ICD-10-CM

## 2024-12-02 PROCEDURE — 73030 X-RAY EXAM OF SHOULDER: CPT | Mod: RT,,,

## 2024-12-02 PROCEDURE — 99024 POSTOP FOLLOW-UP VISIT: CPT | Mod: POP,,,

## 2024-12-02 RX ORDER — HYDROCODONE BITARTRATE AND ACETAMINOPHEN 5; 325 MG/1; MG/1
1 TABLET ORAL EVERY 8 HOURS PRN
Qty: 21 TABLET | Refills: 0 | Status: SHIPPED | OUTPATIENT
Start: 2024-12-02

## 2024-12-02 NOTE — PROGRESS NOTES
Subjective:      Patient ID: Ivette Santillan is a 64 y.o. female.    Chief Complaint: Post-op Evaluation (2wk sp RT RTSA SX 11/14/24-2/12/25. )      Date of procedure: 11/14/2024     Procedure performed:  Right reverse total shoulder arthroplasty and biceps tenodesis     Patient returns to the clinic today for post-operative examination. Patient is status post the above-stated procedure. Overall doing well. Patient has been compliant with postop sling. Denies any sensorimotor changes or wound issues. Pain controlled. Has not started PT per my request. No complaints at this time. No f/c/ns.       Review of Systems  Comprehensive review of systems completed and negative except as per HPI.    Objective:     Vitals:    12/02/24 1006   BP: 136/77   Pulse: 74       General: well-developed well-nourished in no acute distress    Physical Exam:  Right shoulder  Incisions are healing appropriately with no erythema, fluctuance, induration, drainage or external signs of infection. Steri strips in place  No gross swelling or epitrochlear lymphadenopathy appreciated  ROM of the shoulder deferred at this time. Firing deltoid. Full range of motion of the elbow and distally.  Sensation grossly intact to all dermatomal distributions  No neurological deficits appreciated   Palpable radial pulse    Imaging: X-rays of right shoulder three views shows hardware in place with no signs of loosening or failure.     Assessment:       Encounter Diagnosis   Name Primary?    Osteoarthritis of right shoulder, unspecified osteoarthritis type Yes         Plan:       Ivette was seen today for post-op evaluation.    Diagnoses and all orders for this visit:    Osteoarthritis of right shoulder, unspecified osteoarthritis type  -     X-ray Shoulder 2 or More Views Right; Future        s/p above stated procedure; doing well     PT - Acts Yordy prescription provided; Reverse total shoulder arthroplasty protocol   Pain control - continue ice and meds. Risks of  medications discussed  Immobilization - Continue sling at all times. May remove for hygiene and ROM of the elbow and distally.  WB- NWB RUE  F/u in 4 weeks    Follow-up: Ivette Santillan to follow up in 4 weeks. If there are any questions prior to this, the patient was instructed to contact the office.

## 2024-12-05 ENCOUNTER — TELEPHONE (OUTPATIENT)
Dept: PRIMARY CARE CLINIC | Facility: CLINIC | Age: 64
End: 2024-12-05
Payer: MEDICARE

## 2024-12-05 DIAGNOSIS — R60.9 EDEMA, UNSPECIFIED TYPE: Primary | ICD-10-CM

## 2024-12-05 RX ORDER — FUROSEMIDE 20 MG/1
20 TABLET ORAL DAILY
Qty: 5 TABLET | Refills: 0 | Status: SHIPPED | OUTPATIENT
Start: 2024-12-05

## 2024-12-05 NOTE — TELEPHONE ENCOUNTER
Short course of Lasix sent to pharmacy. Pls have pt use compression socks and raise legs when sitting. Thanks

## 2024-12-05 NOTE — TELEPHONE ENCOUNTER
----- Message from Nurse White sent at 12/5/2024 10:19 AM CST -----    ----- Message -----  From: Gabbie Vo  Sent: 12/5/2024  10:06 AM CST  To: [facility] Staff    .Who Called: Ivette Santillan    Caller is requesting assistance/information from provider's office.    Symptoms (please be specific): both feet both legs and both ankle   How long has patient had these symptoms:  2 days  List of preferred pharmacies on file (remove unneeded): [unfilled]  If different, enter pharmacy into here including location and phone number: same      Preferred Method of Contact: Phone Call  Patient's Preferred Phone Number on File: [phone]   Best Call Back Number, if different:  Additional Information: pt feet ankle and legs swollen and asking for meds to help with swollen

## 2024-12-12 DIAGNOSIS — G89.18 POST-OP PAIN: ICD-10-CM

## 2024-12-12 RX ORDER — HYDROCODONE BITARTRATE AND ACETAMINOPHEN 5; 325 MG/1; MG/1
1 TABLET ORAL EVERY 8 HOURS PRN
Qty: 21 TABLET | Refills: 0 | Status: SHIPPED | OUTPATIENT
Start: 2024-12-12

## 2024-12-12 NOTE — TELEPHONE ENCOUNTER
Patient lvm asking for a refill on pain medication. Patient stated she has been trying to only take tylenol or a half a tablet for her post op pain, but has had some set backs this week. She stated she has been sick with a virus this week and missed this entire week of PT. Stated she has been in a lot more pain than when she is attending PT regularly.    Spoke with patient. Informed rx request would be sent to [facility], once pharmacy has it filled they should let her know when it is ready.

## 2024-12-17 ENCOUNTER — PATIENT MESSAGE (OUTPATIENT)
Dept: PRIMARY CARE CLINIC | Facility: CLINIC | Age: 64
End: 2024-12-17
Payer: MEDICARE

## 2024-12-17 ENCOUNTER — INFUSION (OUTPATIENT)
Dept: INFUSION THERAPY | Facility: HOSPITAL | Age: 64
End: 2024-12-17
Attending: INTERNAL MEDICINE
Payer: MEDICARE

## 2024-12-17 VITALS
WEIGHT: 211.63 LBS | HEIGHT: 62 IN | OXYGEN SATURATION: 97 % | TEMPERATURE: 98 F | SYSTOLIC BLOOD PRESSURE: 133 MMHG | DIASTOLIC BLOOD PRESSURE: 62 MMHG | BODY MASS INDEX: 38.94 KG/M2 | RESPIRATION RATE: 18 BRPM | HEART RATE: 68 BPM

## 2024-12-17 DIAGNOSIS — J45.50 SEVERE PERSISTENT ASTHMA, UNSPECIFIED WHETHER COMPLICATED: Primary | ICD-10-CM

## 2024-12-17 PROCEDURE — 63600175 PHARM REV CODE 636 W HCPCS: Mod: JZ,JG | Performed by: INTERNAL MEDICINE

## 2024-12-17 PROCEDURE — 96372 THER/PROPH/DIAG INJ SC/IM: CPT

## 2024-12-17 RX ADMIN — BENRALIZUMAB 30 MG: 30 INJECTION, SOLUTION SUBCUTANEOUS at 08:12

## 2024-12-17 NOTE — NURSING
Fasenra given, tolerated well. Discharged in stable condition and is aware of future appointments.

## 2025-01-06 ENCOUNTER — OFFICE VISIT (OUTPATIENT)
Dept: ORTHOPEDICS | Facility: CLINIC | Age: 65
End: 2025-01-06
Payer: MEDICARE

## 2025-01-06 ENCOUNTER — HOSPITAL ENCOUNTER (OUTPATIENT)
Dept: RADIOLOGY | Facility: CLINIC | Age: 65
Discharge: HOME | End: 2025-01-06
Attending: REHABILITATION UNIT
Payer: MEDICARE

## 2025-01-06 VITALS
WEIGHT: 205 LBS | DIASTOLIC BLOOD PRESSURE: 68 MMHG | HEART RATE: 61 BPM | SYSTOLIC BLOOD PRESSURE: 131 MMHG | HEIGHT: 62 IN | BODY MASS INDEX: 37.73 KG/M2

## 2025-01-06 DIAGNOSIS — Z96.611 STATUS POST REVERSE TOTAL REPLACEMENT OF RIGHT SHOULDER: ICD-10-CM

## 2025-01-06 DIAGNOSIS — Z96.611 STATUS POST REVERSE TOTAL REPLACEMENT OF RIGHT SHOULDER: Primary | ICD-10-CM

## 2025-01-06 PROCEDURE — 73030 X-RAY EXAM OF SHOULDER: CPT | Mod: RT,,, | Performed by: REHABILITATION UNIT

## 2025-01-06 PROCEDURE — 99024 POSTOP FOLLOW-UP VISIT: CPT | Mod: POP,,, | Performed by: REHABILITATION UNIT

## 2025-01-06 NOTE — PROGRESS NOTES
Subjective:      Patient ID: Ivette Santillan is a 64 y.o. female.    Chief Complaint: Follow-up of the Right Elbow (Rt RTSA Sx 11/14/24. Patient states she is doing good. She is in PT 3xs a week its going well. She does have arm sling on today. Not currently taking pain medication for about a week now just tylenol. )    Date of procedure: 11/14/2024     Procedure performed:  Right reverse total shoulder arthroplasty and biceps tenodesis     Patient returns to the clinic today for post-operative examination. Patient is status post the above-stated procedure. Overall doing well. Patient has been compliant with postop sling. Denies any sensorimotor changes or wound issues. Pain controlled. She has been in PT. Issues with bandages to elbow causing blisters which are resolving. No f/c/ns.       Review of Systems  Comprehensive review of systems completed and negative except as per HPI.    Objective:     Vitals:    01/06/25 0939   BP: 131/68   Pulse: 61       General: well-developed well-nourished in no acute distress    Physical Exam:  Right shoulder  Incision well healed with no erythema, fluctuance, induration, drainage or external signs of infection. Healing blisters to elbow   No gross swelling or epitrochlear lymphadenopathy appreciated  , abd 100, ER 50  Full range of motion of the elbow and distally.  Sensation grossly intact to all dermatomal distributions  No neurological deficits appreciated   Palpable radial pulse    Imaging: X-rays of right shoulder four views shows hardware in place with no signs of loosening or failure.     Assessment:       Encounter Diagnosis   Name Primary?    Status post reverse total replacement of right shoulder Yes           Plan:       Ivette was seen today for follow-up.    Diagnoses and all orders for this visit:    Status post reverse total replacement of right shoulder  -     X-ray Shoulder 2 or More Views Right; Future      s/p above stated procedure; doing well     Continue  PT - Jacque Scales; Reverse total shoulder arthroplasty protocol   Pain control - continue ice and meds. Risks of medications discussed  Immobilization - wean from sling  WB- NWB RUE  F/u in 6-8 weeks    Follow-up: Ivette Santillan to follow up as above. If there are any questions prior to this, the patient was instructed to contact the office.

## 2025-02-11 ENCOUNTER — INFUSION (OUTPATIENT)
Dept: INFUSION THERAPY | Facility: HOSPITAL | Age: 65
End: 2025-02-11
Attending: INTERNAL MEDICINE
Payer: MEDICARE

## 2025-02-11 VITALS
SYSTOLIC BLOOD PRESSURE: 137 MMHG | BODY MASS INDEX: 38.67 KG/M2 | WEIGHT: 211.38 LBS | HEART RATE: 70 BPM | DIASTOLIC BLOOD PRESSURE: 65 MMHG | RESPIRATION RATE: 16 BRPM | TEMPERATURE: 98 F

## 2025-02-11 DIAGNOSIS — J45.50 SEVERE PERSISTENT ASTHMA, UNSPECIFIED WHETHER COMPLICATED: Primary | ICD-10-CM

## 2025-02-11 PROCEDURE — 63600175 PHARM REV CODE 636 W HCPCS: Mod: JZ,TB | Performed by: INTERNAL MEDICINE

## 2025-02-11 PROCEDURE — 96372 THER/PROPH/DIAG INJ SC/IM: CPT

## 2025-02-11 RX ADMIN — BENRALIZUMAB 30 MG: 30 INJECTION, SOLUTION SUBCUTANEOUS at 02:02

## 2025-02-20 ENCOUNTER — HOSPITAL ENCOUNTER (OUTPATIENT)
Dept: RADIOLOGY | Facility: HOSPITAL | Age: 65
Discharge: HOME | End: 2025-02-20
Attending: INTERNAL MEDICINE
Payer: MEDICARE

## 2025-02-20 DIAGNOSIS — M81.0 SENILE OSTEOPOROSIS: ICD-10-CM

## 2025-02-20 PROCEDURE — 73130 X-RAY EXAM OF HAND: CPT | Mod: TC,LT

## 2025-02-20 PROCEDURE — 73110 X-RAY EXAM OF WRIST: CPT | Mod: TC,RT

## 2025-02-20 PROCEDURE — 73130 X-RAY EXAM OF HAND: CPT | Mod: TC,RT

## 2025-02-20 PROCEDURE — 73110 X-RAY EXAM OF WRIST: CPT | Mod: TC,LT

## 2025-03-10 ENCOUNTER — HOSPITAL ENCOUNTER (OUTPATIENT)
Dept: RADIOLOGY | Facility: CLINIC | Age: 65
Discharge: HOME | End: 2025-03-10
Attending: REHABILITATION UNIT
Payer: MEDICARE

## 2025-03-10 ENCOUNTER — OFFICE VISIT (OUTPATIENT)
Dept: ORTHOPEDICS | Facility: CLINIC | Age: 65
End: 2025-03-10
Payer: MEDICARE

## 2025-03-10 VITALS
TEMPERATURE: 97 F | WEIGHT: 209 LBS | BODY MASS INDEX: 38.46 KG/M2 | SYSTOLIC BLOOD PRESSURE: 133 MMHG | DIASTOLIC BLOOD PRESSURE: 69 MMHG | HEIGHT: 62 IN | HEART RATE: 70 BPM

## 2025-03-10 DIAGNOSIS — R52 PAIN: ICD-10-CM

## 2025-03-10 DIAGNOSIS — Z96.611 STATUS POST REVERSE TOTAL REPLACEMENT OF RIGHT SHOULDER: ICD-10-CM

## 2025-03-10 DIAGNOSIS — Z96.611 STATUS POST REVERSE TOTAL REPLACEMENT OF RIGHT SHOULDER: Primary | ICD-10-CM

## 2025-03-10 PROCEDURE — 73030 X-RAY EXAM OF SHOULDER: CPT | Mod: RT,,, | Performed by: REHABILITATION UNIT

## 2025-03-10 PROCEDURE — 73030 X-RAY EXAM OF SHOULDER: CPT | Mod: LT,,, | Performed by: REHABILITATION UNIT

## 2025-03-10 PROCEDURE — 99213 OFFICE O/P EST LOW 20 MIN: CPT | Mod: ,,, | Performed by: REHABILITATION UNIT

## 2025-03-10 NOTE — PROGRESS NOTES
Subjective:      Patient ID: Ivette Santillan is a 64 y.o. female.    Chief Complaint: Follow-up of the Right Shoulder (Rt RTSA (Sx 11/14/24 - gl 2/12/25) - Patient is doing well. Patient states that in the morning she has aches but improves throughout the day. Patient is currently in physical therapy at [facility] 3/ week. Patient feels improvement with ROM. )    Date of procedure: 11/14/2024     Procedure performed:  Right reverse total shoulder arthroplasty and biceps tenodesis     Patient returns to the clinic today for post-operative examination. Patient is status post the above-stated procedure. Overall doing well.  Progressing well with therapy.  Occasional discomfort which responds to rest and Tylenol.  She has some occasional left shoulder discomfort.  Overall her motion is good.  She we would like x-rays for her baseline.     Review of Systems  Comprehensive review of systems completed and negative except as per HPI.    Objective:     Vitals:    03/10/25 0943   BP: 133/69   Pulse: 70   Temp: 97.1 °F (36.2 °C)       General: well-developed well-nourished in no acute distress    Physical Exam:  Right shoulder  Incision well healed with no erythema, fluctuance, induration, drainage or external signs of infection   No gross swelling or epitrochlear lymphadenopathy appreciated  , ER 45, IR pocket  Full range of motion of the elbow and distally.  Sensation grossly intact to all dermatomal distributions  No neurological deficits appreciated   Palpable radial pulse    Left shoulder  Skin is intact  Full motion and strength.  Cuff is intact  Neurovascularly intact distally    Imaging: X-rays of right shoulder four views shows hardware in place with no signs of loosening or failure.     Four view radiographs of the left shoulder obtained today personally reviewed showing no acute fractures or dislocations.  Joint spaces are well maintained with mild to moderate glenohumeral degenerative changes.  Humeral head  remains seated centrally within the glenoid.      Assessment:       Encounter Diagnosis   Name Primary?    Status post reverse total replacement of right shoulder Yes           Plan:       Ivette was seen today for follow-up.    Diagnoses and all orders for this visit:    Status post reverse total replacement of right shoulder  -     X-ray Shoulder 2 or More Views Right; Future      s/p above stated procedure; doing well     Continue PT - Jacque Scales; Reverse total shoulder arthroplasty protocol   Pain control - continue ice and meds. Risks of medications discussed  Immobilization - none  She may progress her activities to her right upper extremity to incorporate all her activities of daily living  Discussed her left shoulder has some degenerative changes but certainly not as advanced as her right shoulder.  It is not very bothersome her at this time.  We discussed possible injection in the future if it became an issue for her.  F/u in a few months    Follow-up: Ivette P Jacque to follow up as above. If there are any questions prior to this, the patient was instructed to contact the office.

## 2025-03-18 ENCOUNTER — OFFICE VISIT (OUTPATIENT)
Facility: CLINIC | Age: 65
End: 2025-03-18
Payer: MEDICARE

## 2025-03-18 VITALS
HEIGHT: 62 IN | BODY MASS INDEX: 38.28 KG/M2 | DIASTOLIC BLOOD PRESSURE: 88 MMHG | WEIGHT: 208 LBS | SYSTOLIC BLOOD PRESSURE: 132 MMHG

## 2025-03-18 DIAGNOSIS — G47.33 OSA (OBSTRUCTIVE SLEEP APNEA): Primary | ICD-10-CM

## 2025-03-18 PROCEDURE — 99999 PR PBB SHADOW E&M-EST. PATIENT-LVL IV: CPT | Mod: PBBFAC,,,

## 2025-03-18 PROCEDURE — 99214 OFFICE O/P EST MOD 30 MIN: CPT | Mod: PBBFAC

## 2025-03-18 PROCEDURE — 99213 OFFICE O/P EST LOW 20 MIN: CPT | Mod: S$PBB,,,

## 2025-03-18 NOTE — PROGRESS NOTES
Neurology    Established SHARIF Patient   SUBJECTIVE:    Patient ID: Ivette Santillan is a 64 y.o. female.    Chief Complaint: SHARIF f/u    History of Present Illness:     Pt here today for SHARIF follow-up.     Wears CPAP qhs and is tolerating it well. Sleep better w CPAP. Sleeps 9-10 hrs a night and awakens once due to nocturia and is able to go right back to sleep. Awakens refreshed and denies EDS. Naps 1 hr a day wo CPAP. Changes mask and tubing on a regular basis. CPAP is saying motor life has been exceeded. [facility].         3/18/2025   EPWORTH SLEEPINESS SCALE   Sitting and reading 1   Watching TV 0   Sitting, inactive in a public place (e.g. a theatre or a meeting) 1   As a passenger in a car for an hour without a break 2   Lying down to rest in the afternoon when circumstances permit 3   Sitting and talking to someone 0   Sitting quietly after a lunch without alcohol 1   In a car, while stopped for a few minutes in traffic 0   Total score 8     Review of Systems -  as per HPI, otherwise pertinent systems review is negative           Past Medical History:   Diagnosis Date    Allergic rhinitis due to pollen     Anxiety disorder, unspecified     Arthritis     Depression     GERD (gastroesophageal reflux disease)     HTN (hypertension)     Mild intermittent asthma, uncomplicated     Obesity, unspecified     Personal history of colonic polyps     Sleep apnea, unspecified     cpap       Past Surgical History:   Procedure Laterality Date    APPENDECTOMY  1966    BREAST LUMPECTOMY Bilateral     benign    COLONOSCOPY  06/01/2017    JOINT REPLACEMENT  2019    Knee replacement Bilateral    REVERSE TOTAL SHOULDER ARTHROPLASTY Right 11/14/2024    Procedure: ARTHROPLASTY, SHOULDER, TOTAL, REVERSE/ Biomet;  Surgeon: Jarrell Enriquez MD;  Location: [facility];  Service: Orthopedics;  Laterality: Right;       Family History   Problem Relation Name Age of Onset    Heart failure Mother Evelia Vieyra     Stroke Mother Evelia Vieyra      Arthritis Mother Evelia Vieyra             Heart disease Mother Evelia Vieyra     Hypertension Mother Evelia Vieyra     Cancer Father Harsh Vieyra     Alcohol abuse Father Harsh Vieyra     Heart failure Sister Rehana Villegas     Cancer Sister Rehana Villegas     Heart failure Brother Dallas Vieyra     Cancer Brother [place] Vieyra     Early death Brother [place] Vieyra     Heart disease Sister Mabel Sapp     Hypertension Sister Mabel Sapp        Social History     Socioeconomic History    Marital status:    Tobacco Use    Smoking status: Never    Smokeless tobacco: Never   Substance and Sexual Activity    Alcohol use: Never    Drug use: Never    Sexual activity: Yes     Partners: Male     Birth control/protection: Post-menopausal     Comment: With spouse only     Social Drivers of Health     Financial Resource Strain: Low Risk  (2025)    Overall Financial Resource Strain (CARDIA)     Difficulty of Paying Living Expenses: Not hard at all   Food Insecurity: No Food Insecurity (2025)    Hunger Vital Sign     Worried About Running Out of Food in the Last Year: Never true     Ran Out of Food in the Last Year: Never true   Transportation Needs: No Transportation Needs (2025)    PRAPARE - Transportation     Lack of Transportation (Medical): No     Lack of Transportation (Non-Medical): No   Physical Activity: Inactive (2025)    Exercise Vital Sign     Days of Exercise per Week: 0 days     Minutes of Exercise per Session: 0 min   Stress: No Stress Concern Present (2025)    [employer] - Occupational Stress Questionnaire     Feeling of Stress : Not at all   Housing Stability: Low Risk  (2025)    Housing Stability Vital Sign     Unable to Pay for Housing in the Last Year: No     Number of Times Moved in the Last Year: 0     Homeless in the Last Year: No       Review of patient's allergies indicates:   Allergen Reactions    Metoclopramide      Other reaction(s): anxiety    Other  "Reaction(s): Unknown    Amoxicillin-pot clavulanate      Other reaction(s): rash    Other Reaction(s): Unknown    Clindamycin      Other reaction(s): nausea, vomiting    Ibuprofen      Other reaction(s): rash       Current Outpatient Medications   Medication Instructions    albuterol (PROAIR HFA) 90 mcg/actuation inhaler 2 puffs, Inhalation, Every 6 hours PRN, Rescue    albuterol-ipratropium (DUO-NEB) 2.5 mg-0.5 mg/3 mL nebulizer solution 3 mLs, Nebulization, Every 6 hours PRN, Rescue    ascorbic acid (vitamin C) (VITAMIN C) 1,000 mg, 2 times daily    aspirin (ECOTRIN) 81 mg, Oral, Daily, Increase to twice a day for 4 weeks post-op for blood clot prevention.    AUVI-Q 0.3 mg/0.3 mL AtIn As needed (PRN)    azelastine (ASTELIN) 137 mcg (0.1 %) nasal spray 1 spray, 2 times daily    benralizumab (FASENRA) 30 mg, Every 8 weeks    dicyclomine (BENTYL) 10 MG capsule TAKE ONE TABLET EVERY 6 HOURS. AS NEEDED FOR CRAMPS    EScitalopram oxalate (LEXAPRO) 10 mg, Oral, Daily    EVENITY 210 mg, Every 30 days    ezetimibe (ZETIA) 10 mg, Daily    famotidine (PEPCID) 40 mg, Oral, Nightly, 2 tablets at night    fexofenadine (ALLEGRA) 180 mg, Daily    fluticasone propionate (FLONASE) 50 mcg/actuation nasal spray 1 spray, 2 times daily    fluticasone-umeclidin-vilanter (TRELEGY ELLIPTA) 200-62.5-25 mcg inhaler 1 puff, Inhalation    losartan-hydrochlorothiazide 100-25 mg (HYZAAR) 100-25 mg per tablet 1 tablet, Daily    metoprolol tartrate (LOPRESSOR) 25 mg, 2 times daily    montelukast (SINGULAIR) 10 mg, Oral, Nightly    multivitamin capsule 1 capsule, Daily    NON FORMULARY MEDICATION 1 application , Weekly    omeprazole (PRILOSEC) 40 mg, Oral, Daily    pravastatin (PRAVACHOL) 40 mg, Nightly    spironolactone (ALDACTONE) 100 mg, Oral, Daily    zinc gluconate 50 mg, Daily       OBJECTIVE:    Vitals:  Visit Vitals  /88 (BP Location: Left arm, Patient Position: Sitting)   Ht 5' 2" (1.575 m)   Wt 94.3 kg (208 lb)   BMI 38.04 kg/m² "       Physical Exam:  Constitutional  she appears well-developed and well-nourished. she is well groomed.    Accompanied by - self  Appearance - well appearing, no apparent distress, unassisted  Oropharynx - Mallampati: 4   Heart - RRR auscultated without murmur  Lungs - CTA, pulmonary effort normal  Skin- no obvious lesions noted    Neurologic  Cortical function - The patient is alert, attentive   Speech - clear   Cranial nerves:  CN 3, 4, 6 EOMs - normal. No ptosis, nystagmus, or lateral gaze deviation  CN 7 - no face asymmetry   CN 8 - hearing is grossly normal  Motor - no lateralizing deficits; grossly normal strength  Gait - unassisted; posture upright. Gait is steady with normal steps    Review of Data:      PAP Compliance Report  02/11/2025-03/12/2025  Usage > 4 hrs - 100%  AHI - 2.8     ASSESSMENT/PLAN:    1. SHARIF (obstructive sleep apnea)  -     CPAP FOR HOME USE    - machine giving message that motor has exceeded its lifespan. New cpap ordered today. Reminded pt about minimum insurance guidelines for PAP use  - Continues to benefit from PAP therapy. Encouraged nightly use of PAP.   - Drowsy driving may still occur despite PAP use.     Follow up in about 8 weeks (around 5/13/2025) for Virtual Visit, SHARIF. In addition to their scheduled follow up, the patient has also been instructed to follow up on as needed basis.     Questions and concerns were sought and answered to the patient's stated verbal satisfaction.    The patient verbalizes understanding and agreement with the above stated treatment plan.   Items discussed include acute and/or chronic neurological, sleep, or other issues and their attendant differential diagnoses.  Potential for additional testing, treatment options, and prognosis also discussed.  Dr. Parker Trinidad available during encounter.    Gladys Pandey, MSN, APRN, FNP-C  [facility]  [phone]

## 2025-03-24 ENCOUNTER — TELEPHONE (OUTPATIENT)
Dept: PRIMARY CARE CLINIC | Facility: CLINIC | Age: 65
End: 2025-03-24
Payer: MEDICARE

## 2025-03-24 DIAGNOSIS — F41.9 ANXIETY: ICD-10-CM

## 2025-03-24 RX ORDER — ESCITALOPRAM OXALATE 10 MG/1
10 TABLET ORAL DAILY
Qty: 90 TABLET | Refills: 0 | Status: SHIPPED | OUTPATIENT
Start: 2025-03-24 | End: 2025-03-26 | Stop reason: SDUPTHER

## 2025-03-24 NOTE — TELEPHONE ENCOUNTER
----- Message from Aggie sent at 3/24/2025 10:31 AM CDT -----  .Who Called: Ivette MENDEZ SantRefill or New Rx:RefillRX Name and Strength:EScitalopram oxalate (LEXAPRO) 10 MG tabletHow is the patient currently taking it? (ex. 1XDay):1x per day Is this a 30 day or 90 day RX:90Local or Mail Order:local List of preferred pharmacies on file (remove unneeded): [facility] [place] If different Pharmacy is requested, enter Pharmacy information here including location and phone number:  Ordering Provider:acostaPreferrmeir Method of Contact: Phone CallPatient's Preferred Phone Number on File: [phone] Best Call Back Number, if different:Additional Information: EScitalopram oxalate (LEXAPRO) 10 MG tablet

## 2025-03-26 DIAGNOSIS — F41.9 ANXIETY: ICD-10-CM

## 2025-03-26 RX ORDER — ESCITALOPRAM OXALATE 10 MG/1
10 TABLET ORAL DAILY
Qty: 90 TABLET | Refills: 0 | Status: SHIPPED | OUTPATIENT
Start: 2025-03-26

## 2025-04-08 ENCOUNTER — INFUSION (OUTPATIENT)
Dept: INFUSION THERAPY | Facility: HOSPITAL | Age: 65
End: 2025-04-08
Attending: INTERNAL MEDICINE
Payer: MEDICARE

## 2025-04-08 VITALS
HEART RATE: 59 BPM | OXYGEN SATURATION: 100 % | RESPIRATION RATE: 16 BRPM | DIASTOLIC BLOOD PRESSURE: 65 MMHG | BODY MASS INDEX: 38.5 KG/M2 | SYSTOLIC BLOOD PRESSURE: 135 MMHG | HEIGHT: 62 IN | WEIGHT: 209.19 LBS

## 2025-04-08 DIAGNOSIS — J45.50 SEVERE PERSISTENT ASTHMA, UNSPECIFIED WHETHER COMPLICATED: Primary | ICD-10-CM

## 2025-04-08 PROCEDURE — 63600175 PHARM REV CODE 636 W HCPCS: Mod: JZ,TB | Performed by: INTERNAL MEDICINE

## 2025-04-08 PROCEDURE — 96372 THER/PROPH/DIAG INJ SC/IM: CPT

## 2025-04-08 RX ADMIN — BENRALIZUMAB 30 MG: 30 INJECTION, SOLUTION SUBCUTANEOUS at 08:04

## 2025-04-21 ENCOUNTER — TELEPHONE (OUTPATIENT)
Dept: PRIMARY CARE CLINIC | Facility: CLINIC | Age: 65
End: 2025-04-21
Payer: MEDICARE

## 2025-04-21 DIAGNOSIS — I10 PRIMARY HYPERTENSION: Primary | ICD-10-CM

## 2025-04-21 RX ORDER — LOSARTAN POTASSIUM AND HYDROCHLOROTHIAZIDE 25; 100 MG/1; MG/1
1 TABLET ORAL DAILY
Qty: 90 TABLET | Refills: 3 | Status: SHIPPED | OUTPATIENT
Start: 2025-04-21

## 2025-04-21 NOTE — TELEPHONE ENCOUNTER
----- Message from Gabbie sent at 4/21/2025  3:09 PM CDT -----  .Who Called: Ivette MENDEZ SantRefill or New Rx:RefillRX Name and Strength:losartan-hydrochlorothiazide 100-25 mg (HYZAAR) 100-25 mg per tabletHow is the patient currently taking it? (ex. 1XDay):1x dayIs this a 30 day or 90 day RX:30Local or Mail Order:localList of preferred pharmacies on file (remove unneeded): [unfilled]If different Pharmacy is requested, enter Pharmacy information here including location and phone number: [facility] Provider:AcostaPreferred Method of Contact: Phone CallPatient's Preferred Phone Number on File: [phone] Best Call Back Number, if different:Additional Information: refills

## 2025-05-07 ENCOUNTER — TELEPHONE (OUTPATIENT)
Dept: ORTHOPEDICS | Facility: CLINIC | Age: 65
End: 2025-05-07
Payer: MEDICARE

## 2025-05-07 NOTE — TELEPHONE ENCOUNTER
Pt asked that I email her the dental antibiotic recommendation because she is having a cleaning coming up. Asked that I email it to [email or web address] Patient left information in a voicemail.     Spoke with patient. She confirmed she got my email and thanked me. Patient had no further questions.

## 2025-05-09 ENCOUNTER — TELEPHONE (OUTPATIENT)
Dept: PRIMARY CARE CLINIC | Facility: CLINIC | Age: 65
End: 2025-05-09
Payer: MEDICARE

## 2025-05-09 DIAGNOSIS — R12 HEARTBURN: ICD-10-CM

## 2025-05-09 RX ORDER — OMEPRAZOLE 20 MG/1
40 CAPSULE, DELAYED RELEASE ORAL DAILY
Qty: 180 CAPSULE | Refills: 3 | Status: SHIPPED | OUTPATIENT
Start: 2025-05-09

## 2025-05-09 NOTE — TELEPHONE ENCOUNTER
Copied from CRM #[id number]. Topic: Medications - Pharmacy  >> May 9, 2025 10:05 AM Urszula wrote:  Who Called: arron in regards to Ivettegia Santillan    Pharmacy is calling to request assistance with Rx    Pharmacy name and phone number: [facility][id number] at [place] [address] AT    Phone: [phone]  Fax: [phone]        Pharmacy contact: arron  Patient Name: ivette  Prescription Name: omeprazole (PRILOSEC) 20 MG capsule 90 capsule   What do they need to clarify?:refill request        Preferred Method of Contact: Phone Call  Patient's Preferred Phone Number on File: [phone]   Best Call Back Number, if different:arron [phone]  Additional Information: pharmacy call, please advise, thanks

## 2025-05-12 ENCOUNTER — TELEPHONE (OUTPATIENT)
Dept: PRIMARY CARE CLINIC | Facility: CLINIC | Age: 65
End: 2025-05-12
Payer: MEDICARE

## 2025-05-12 DIAGNOSIS — R12 HEARTBURN: ICD-10-CM

## 2025-05-12 RX ORDER — OMEPRAZOLE 20 MG/1
40 CAPSULE, DELAYED RELEASE ORAL DAILY
Qty: 180 CAPSULE | Refills: 3 | Status: SHIPPED | OUTPATIENT
Start: 2025-05-12 | End: 2025-05-13 | Stop reason: SDUPTHER

## 2025-05-12 NOTE — TELEPHONE ENCOUNTER
Copied from CRM #[id number]. Topic: Medications - Medication Refill  >> May 12, 2025 11:24 AM Dorina wrote:  .Who Called: Ivette Santillan    Refill or New Rx:Refill  RX Name and Strength: omeprazole (PRILOSEC) 20 MG capsule  How is the patient currently taking it? (ex. 1XDay):2xday  Is this a 30 day or 90 day RX:180  Local or Mail Order:Local  List of preferred pharmacies on file (remove unneeded): [id number] AT [place] [address] AT   If different Pharmacy is requested, enter Pharmacy information here including location and phone number: na   Ordering Provider:PCP      Preferred Method of Contact: Phone Call  Patient's Preferred Phone Number on File: [phone]   Best Call Back Number, if different:  Additional Information:

## 2025-05-13 DIAGNOSIS — R12 HEARTBURN: ICD-10-CM

## 2025-05-13 RX ORDER — OMEPRAZOLE 20 MG/1
40 CAPSULE, DELAYED RELEASE ORAL DAILY
Qty: 180 CAPSULE | Refills: 3 | Status: SHIPPED | OUTPATIENT
Start: 2025-05-13

## 2025-05-29 DIAGNOSIS — R12 HEARTBURN: ICD-10-CM

## 2025-05-29 RX ORDER — FAMOTIDINE 20 MG/1
40 TABLET, FILM COATED ORAL NIGHTLY
Qty: 180 TABLET | Refills: 3 | Status: SHIPPED | OUTPATIENT
Start: 2025-05-29

## 2025-06-02 ENCOUNTER — OFFICE VISIT (OUTPATIENT)
Facility: CLINIC | Age: 65
End: 2025-06-02
Payer: MEDICARE

## 2025-06-02 ENCOUNTER — PATIENT MESSAGE (OUTPATIENT)
Facility: CLINIC | Age: 65
End: 2025-06-02

## 2025-06-02 DIAGNOSIS — G47.33 OSA (OBSTRUCTIVE SLEEP APNEA): Primary | ICD-10-CM

## 2025-06-02 PROCEDURE — 98004 SYNCH AUDIO-VIDEO EST SF 10: CPT | Mod: 95,,,

## 2025-06-03 ENCOUNTER — INFUSION (OUTPATIENT)
Dept: INFUSION THERAPY | Facility: HOSPITAL | Age: 65
End: 2025-06-03
Attending: INTERNAL MEDICINE
Payer: MEDICARE

## 2025-06-03 VITALS
HEART RATE: 61 BPM | RESPIRATION RATE: 18 BRPM | HEIGHT: 62 IN | BODY MASS INDEX: 38.4 KG/M2 | WEIGHT: 208.69 LBS | TEMPERATURE: 98 F

## 2025-06-03 DIAGNOSIS — J45.50 SEVERE PERSISTENT ASTHMA, UNSPECIFIED WHETHER COMPLICATED: Primary | ICD-10-CM

## 2025-06-03 PROCEDURE — 63600175 PHARM REV CODE 636 W HCPCS: Mod: JZ,TB | Performed by: INTERNAL MEDICINE

## 2025-06-03 PROCEDURE — 96372 THER/PROPH/DIAG INJ SC/IM: CPT

## 2025-06-03 RX ADMIN — BENRALIZUMAB 30 MG: 30 INJECTION, SOLUTION SUBCUTANEOUS at 08:06

## 2025-06-12 ENCOUNTER — OFFICE VISIT (OUTPATIENT)
Dept: PRIMARY CARE CLINIC | Facility: CLINIC | Age: 65
End: 2025-06-12
Payer: MEDICARE

## 2025-06-12 ENCOUNTER — HOSPITAL ENCOUNTER (EMERGENCY)
Facility: HOSPITAL | Age: 65
Discharge: HOME | End: 2025-06-12
Attending: EMERGENCY MEDICINE
Payer: MEDICARE

## 2025-06-12 VITALS
HEART RATE: 68 BPM | HEIGHT: 62 IN | BODY MASS INDEX: 38.28 KG/M2 | RESPIRATION RATE: 20 BRPM | DIASTOLIC BLOOD PRESSURE: 78 MMHG | SYSTOLIC BLOOD PRESSURE: 156 MMHG | TEMPERATURE: 98 F | OXYGEN SATURATION: 98 % | WEIGHT: 208 LBS

## 2025-06-12 VITALS
OXYGEN SATURATION: 97 % | WEIGHT: 208.81 LBS | SYSTOLIC BLOOD PRESSURE: 123 MMHG | DIASTOLIC BLOOD PRESSURE: 70 MMHG | HEART RATE: 52 BPM | HEIGHT: 62 IN | BODY MASS INDEX: 38.42 KG/M2

## 2025-06-12 DIAGNOSIS — J45.50 SEVERE PERSISTENT ASTHMA, UNSPECIFIED WHETHER COMPLICATED: ICD-10-CM

## 2025-06-12 DIAGNOSIS — G57.61 MORTON'S NEUROMA OF RIGHT FOOT: Primary | ICD-10-CM

## 2025-06-12 DIAGNOSIS — R53.83 FATIGUE, UNSPECIFIED TYPE: ICD-10-CM

## 2025-06-12 DIAGNOSIS — I10 PRIMARY HYPERTENSION: ICD-10-CM

## 2025-06-12 DIAGNOSIS — R22.31 ARM MASS, RIGHT: Primary | ICD-10-CM

## 2025-06-12 DIAGNOSIS — M79.671 RIGHT FOOT PAIN: ICD-10-CM

## 2025-06-12 DIAGNOSIS — I25.10 CORONARY ARTERY DISEASE, UNSPECIFIED VESSEL OR LESION TYPE, UNSPECIFIED WHETHER ANGINA PRESENT, UNSPECIFIED WHETHER NATIVE OR TRANSPLANTED HEART: ICD-10-CM

## 2025-06-12 DIAGNOSIS — E66.01 MORBID (SEVERE) OBESITY DUE TO EXCESS CALORIES: ICD-10-CM

## 2025-06-12 DIAGNOSIS — R79.9 ABNORMAL FINDING OF BLOOD CHEMISTRY, UNSPECIFIED: ICD-10-CM

## 2025-06-12 PROBLEM — E87.1 HYPONATREMIA: Status: RESOLVED | Noted: 2024-11-15 | Resolved: 2025-06-12

## 2025-06-12 PROCEDURE — 99283 EMERGENCY DEPT VISIT LOW MDM: CPT | Mod: 25

## 2025-06-12 PROCEDURE — 99214 OFFICE O/P EST MOD 30 MIN: CPT | Mod: ,,, | Performed by: STUDENT IN AN ORGANIZED HEALTH CARE EDUCATION/TRAINING PROGRAM

## 2025-06-12 RX ORDER — METOPROLOL SUCCINATE 25 MG/1
25 TABLET, EXTENDED RELEASE ORAL DAILY
Qty: 90 TABLET | Refills: 3 | Status: SHIPPED | OUTPATIENT
Start: 2025-06-12 | End: 2026-06-12

## 2025-06-12 NOTE — DISCHARGE INSTRUCTIONS
Wear comfortable wide toe shoes. Do not wear narrow shoes or heels.     Call orthopedic in Podiatry to get an appointment ASAP.

## 2025-06-12 NOTE — ASSESSMENT & PLAN NOTE
Stable  Continue current medication regimen: Continue Hyzaar 100-25 mg daily, spironolactone 50 mg b.i.d. switch metoprolol tartrate 25 mg b.i.d. to metoprolol succinate 25 mg daily due to bradycardia and current symptoms of fatigue  Blood pressure at goal <140/90 (<130/80 if otherwise noted)  Recommend DASH diet  Avoid tobacco use  Record BP at home daily and bring log to next office visit, assure that home cuff is calibrated at minimum every 12 months

## 2025-06-12 NOTE — ASSESSMENT & PLAN NOTE
Could be side effect of Evenity and BB  Decrease metoprolol tartrate to succinate 25 mg daily  Obtain TFTs, vitamin-D, CBC, CMP

## 2025-06-12 NOTE — PROGRESS NOTES
Date: 2025  Patient ID: [id number]   Chief Complaint: Follow-up (fatigued)    HPI:   Ivette Santillan is a 64 y.o. female here today for Follow-up (fatigued)  History of Present Illness    Ms. Santillan presents today for evaluation of fatigue and a wrist mass. She reports a wrist mass present since last summer that occasionally becomes very sore. She experiences fatigue, particularly notable on some mornings when she wakes up exhausted with difficulty initiating daily activities. She continues to experience fatigue despite taking B12 1000mg, multivitamin, and vitamin D3 twice daily. She takes Evenity for osteoporosis, experiencing fatigue and exhaustion for one week post-injections. She receives Fasenra every 8 weeks, requiring 2-3 days of limited activity following administration. She also takes Metoprolol twice daily. She has been diagnosed with tendinitis adjacent to the fourth toe. She reports no improvement after 3 weeks of wearing a boot, with persistent pain and aching sensations while in bed. She has a history of knee replacement and recent reverse shoulder replacement. Following shoulder surgery, she experienced temporary hair loss and prolonged cold sensitivity requiring blanket coverage for several months, both of which have resolved. She has a history of severe asthma, previously experiencing up to 20 attacks per day. Her brother had Hodgkin's disease diagnosed at age 22 and  at age 36 from a massive heart attack due to radiation-induced scar tissue. Her sister has a history of rectal cancer and currently has warm autoimmune hemolytic anemia.      ROS:  General: +fatigue  Cardiovascular: -chest pain, +lower extremity edema  Respiratory: -shortness of breath  Musculoskeletal: +joint pain, +lumps/masses, +limb pain, +body aches           Problem List[1]  Outpatient Medications Marked as Taking for the 25 encounter (Office Visit) with Tanya Lagunas MD   Medication Sig Dispense Refill    albuterol  (PROAIR HFA) 90 mcg/actuation inhaler Inhale 2 puffs into the lungs every 6 (six) hours as needed for Wheezing. Rescue 18 g 11    albuterol-ipratropium (DUO-NEB) 2.5 mg-0.5 mg/3 mL nebulizer solution Take 3 mLs by nebulization every 6 (six) hours as needed for Wheezing. Rescue 75 mL 0    ascorbic acid, vitamin C, (VITAMIN C) 1000 MG tablet Take 1,000 mg by mouth 2 (two) times daily.      aspirin (ECOTRIN) 81 MG EC tablet Take 1 tablet (81 mg total) by mouth once daily. Increase to twice a day for 4 weeks post-op for blood clot prevention.      AUVI-Q 0.3 mg/0.3 mL AtIn Inject into the muscle as needed.      azelastine (ASTELIN) 137 mcg (0.1 %) nasal spray 1 spray by Nasal route 2 (two) times daily.      benralizumab (FASENRA) 30 mg/mL Syrg injection Inject 30 mg into the skin every 8 weeks.      dicyclomine (BENTYL) 10 MG capsule TAKE ONE TABLET EVERY 6 HOURS. AS NEEDED FOR CRAMPS 60 capsule 6    EScitalopram oxalate (LEXAPRO) 10 MG tablet Take 1 tablet (10 mg total) by mouth once daily. 90 tablet 0    ezetimibe (ZETIA) 10 mg tablet Take 10 mg by mouth once daily.      famotidine (PEPCID) 20 MG tablet Take 2 tablets (40 mg total) by mouth every evening. 2 tablets at night 180 tablet 3    fexofenadine (ALLEGRA) 180 MG tablet Take 180 mg by mouth once daily.      fluticasone propionate (FLONASE) 50 mcg/actuation nasal spray 1 spray by Each Nostril route 2 (two) times a day.      fluticasone-umeclidin-vilanter (TRELEGY ELLIPTA) 200-62.5-25 mcg inhaler INHALE ONE PUFF BY MOUTH ONCE DAILY 60 each 11    losartan-hydrochlorothiazide 100-25 mg (HYZAAR) 100-25 mg per tablet Take 1 tablet by mouth once daily. 90 tablet 3    montelukast (SINGULAIR) 10 mg tablet Take 1 tablet (10 mg total) by mouth every evening. 90 tablet 3    multivitamin capsule Take 1 capsule by mouth once daily.      NON FORMULARY MEDICATION 1 application  by subcutaneous (via wearable injector) route once a week. Allergy injections weekly      omeprazole  (PRILOSEC) 20 MG capsule Take 2 capsules (40 mg total) by mouth once daily. 180 capsule 3    pravastatin (PRAVACHOL) 40 MG tablet Take 40 mg by mouth every evening.      romosozumab-aqqg (EVENITY) 210mg/2.34mL ( 105mg/1.17mLx2) Inject 210 mg into the skin every 30 days.      spironolactone (ALDACTONE) 100 MG tablet Take 1 tablet (100 mg total) by mouth once daily. 90 tablet 3    zinc gluconate 50 mg tablet Take 50 mg by mouth once daily.      [DISCONTINUED] metoprolol tartrate (LOPRESSOR) 25 MG tablet Take 25 mg by mouth 2 (two) times daily.       Past Medical History:   Diagnosis Date    Allergic rhinitis due to pollen     Anxiety disorder, unspecified     Arthritis     Depression     GERD (gastroesophageal reflux disease)     HTN (hypertension)     Hyponatremia 11/15/2024    Mild intermittent asthma, uncomplicated     Obesity, unspecified     Personal history of colonic polyps     Sleep apnea, unspecified     cpap     Past Surgical History:   Procedure Laterality Date    APPENDECTOMY      BREAST LUMPECTOMY Bilateral     benign    COLONOSCOPY  2017    JOINT REPLACEMENT  2019    Knee replacement Bilateral    REVERSE TOTAL SHOULDER ARTHROPLASTY Right 2024    Procedure: ARTHROPLASTY, SHOULDER, TOTAL, REVERSE/ Biomet;  Surgeon: Jarrell Enriquez MD;  Location: [facility];  Service: Orthopedics;  Laterality: Right;     Review of patient's allergies indicates:   Allergen Reactions    Metoclopramide      Other reaction(s): anxiety    Other Reaction(s): Unknown    Amoxicillin-pot clavulanate      Other reaction(s): rash    Other Reaction(s): Unknown    Clindamycin      Other reaction(s): nausea, vomiting    Ibuprofen      Other reaction(s): rash     Family History   Problem Relation Name Age of Onset    Heart failure Mother Evelia Vieyra     Stroke Mother Evelia Vieyra     Arthritis Mother Evelia Vieyra             Heart disease Mother Evelia Vieyra     Hypertension Mother Evelia Vieyra     Cancer Father Harsh  "Jarrell     Alcohol abuse Father Harsh Vieyra     Heart failure Sister Rehana Villegas     Rectal cancer Sister Rehana Villegas     Anemia Sister Rehana Villegas         warm autoimmune hemolytic anemia    Heart disease Sister Mabel Sapp     Hypertension Sister Mabel Sapp     Heart failure Brother Dallas Vieyra     Cancer Brother Dallas Vieyra         hodgkins at 22    Early death Brother Dallas Vieyra         at 36 for heart attack from radiation scar tissue, left ascending aorta      Social History[2]  Patient Care Team:  Tanya Lagunas MD as PCP - General (Family Medicine)  Johnathon Gamez MD as Consulting Physician (Obstetrics and Gynecology)  Jose F Peralta MD as Consulting Physician (Gastroenterology)  Eric Gonzalez MD as Consulting Physician (Otolaryngology)  Lc Ortega MD as Consulting Physician (Cardiology)  Alfred Alves AGACNP-BC as Nurse Practitioner (Neurology)  Sofía Pena FNP as Nurse Practitioner (Pulmonary Disease)  Jarrell Enriquez MD as Consulting Physician (Orthopedic Surgery)   Subjective:   ROS  See HPI for details  All Other ROS: Negative except as stated in HPI.   Objective:   /70   Pulse (!) 52   Ht 5' 2" (1.575 m)   Wt 94.7 kg (208 lb 12.8 oz)   SpO2 97%   BMI 38.19 kg/m²   Physical Exam  Cardiovascular:      Rate and Rhythm: Regular rhythm. Bradycardia present.   Musculoskeletal:      Comments: Pea-sized firm mobile round mass at dorsum of proximal R forearm, NTTP and no skin changes   Neurological:      Mental Status: She is alert.       Assessment:       ICD-10-CM ICD-9-CM   1. Arm mass, right  R22.31 782.2   2. Fatigue, unspecified type  R53.83 780.79   3. Severe persistent asthma, unspecified whether complicated  J45.50 493.90   4. Coronary artery disease, unspecified vessel or lesion type, unspecified whether angina present, unspecified whether native or transplanted heart  I25.10 414.00   5. Primary hypertension  I10 401.9   6. Morbid (severe) obesity due to " excess calories  E66.01 278.01   7. Abnormal finding of blood chemistry, unspecified  R79.9 790.6   8. Body mass index (BMI) 38.0-38.9, adult  Z68.38 V85.38      Plan:   1. Arm mass, right  Assessment & Plan:  Obtain US R arm    Orders:  -     US Soft Tissue Upper Extremity, Right; Future; Expected date: 06/12/2025    2. Fatigue, unspecified type  Assessment & Plan:  Could be side effect of Evenity and BB  Decrease metoprolol tartrate to succinate 25 mg daily  Obtain TFTs, vitamin-D, CBC, CMP    Orders:  -     CBC Auto Differential; Future; Expected date: 06/12/2025  -     Comprehensive Metabolic Panel; Future; Expected date: 06/12/2025  -     Lipid Panel; Future; Expected date: 06/12/2025  -     TSH; Future; Expected date: 06/12/2025  -     Hemoglobin A1C; Future; Expected date: 06/12/2025  -     T4, Free; Future; Expected date: 06/12/2025  -     Vitamin D; Future; Expected date: 06/12/2025    3. 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 Coronary artery disease, unspecified vessel or lesion type, unspecified whether angina present, unspecified whether native or transplanted heart  Overview:  Continue current regimen  Adequate LDL, HA1C, and BP control.  Appropriate lifestyle changes including smoking cessation, exercise (aerobic >150min/wk), weight loss (goal BMI < 25-30), and dietary modifications (increased fruits/vegetables).  Fish oil  Smoking cessation/prevention  Treat comorbid depression (if applicable)  Flu vaccine (if applicable)  ED precautions discussed: including but not limited to SOB, FOOTE, chest pain, dizziness, near syncope, palpitations, or jaw/back/neck discomfort.       Orders:  -     metoprolol succinate (TOPROL-XL) 25 MG 24 hr tablet; Take 1 tablet (25 mg total) by mouth once daily.  Dispense: 90 tablet; Refill: 3  -     Lipid Panel; Future; Expected date: 06/12/2025  -      Hemoglobin A1C; Future; Expected date: 06/12/2025    5. Primary hypertension  Overview:  Diltiazem stopped due to leg swelling    Assessment & Plan:  Stable  Continue current medication regimen: Continue Hyzaar 100-25 mg daily, spironolactone 50 mg b.i.d. switch metoprolol tartrate 25 mg b.i.d. to metoprolol succinate 25 mg daily due to bradycardia and current symptoms of fatigue  Blood pressure at goal <140/90 (<130/80 if otherwise noted)  Recommend DASH diet  Avoid tobacco use  Record BP at home daily and bring log to next office visit, assure that home cuff is calibrated at minimum every 12 months      Orders:  -     metoprolol succinate (TOPROL-XL) 25 MG 24 hr tablet; Take 1 tablet (25 mg total) by mouth once daily.  Dispense: 90 tablet; Refill: 3    6. Morbid (severe) obesity due to excess calories  Assessment & Plan:  Will discuss at next visit    Orders:  -     CBC Auto Differential; Future; Expected date: 06/12/2025  -     Comprehensive Metabolic Panel; Future; Expected date: 06/12/2025  -     Lipid Panel; Future; Expected date: 06/12/2025  -     TSH; Future; Expected date: 06/12/2025  -     Hemoglobin A1C; Future; Expected date: 06/12/2025  -     T4, Free; Future; Expected date: 06/12/2025  -     Vitamin D; Future; Expected date: 06/12/2025    7. Abnormal finding of blood chemistry, unspecified  -     Hemoglobin A1C; Future; Expected date: 06/12/2025    8. Body mass index (BMI) 38.0-38.9, adult  -     Vitamin D; Future; Expected date: 06/12/2025         Medication List with Changes/Refills   New Medications    METOPROLOL SUCCINATE (TOPROL-XL) 25 MG 24 HR TABLET    Take 1 tablet (25 mg total) by mouth once daily.       Start Date: 6/12/2025 End Date: 6/12/2026   Current Medications    ALBUTEROL (PROAIR HFA) 90 MCG/ACTUATION INHALER    Inhale 2 puffs into the lungs every 6 (six) hours as needed for Wheezing. Rescue       Start Date: 2/26/2025 End Date: --    ALBUTEROL-IPRATROPIUM (DUO-NEB) 2.5 MG-0.5 MG/3 ML  NEBULIZER SOLUTION    Take 3 mLs by nebulization every 6 (six) hours as needed for Wheezing. Rescue       Start Date: 2/26/2025 End Date: 2/26/2026    ASCORBIC ACID, VITAMIN C, (VITAMIN C) 1000 MG TABLET    Take 1,000 mg by mouth 2 (two) times daily.       Start Date: --        End Date: --    ASPIRIN (ECOTRIN) 81 MG EC TABLET    Take 1 tablet (81 mg total) by mouth once daily. Increase to twice a day for 4 weeks post-op for blood clot prevention.       Start Date: 11/15/2024End Date: --    AUVI-Q 0.3 MG/0.3 ML ATIN    Inject into the muscle as needed.       Start Date: 2/9/2023  End Date: --    AZELASTINE (ASTELIN) 137 MCG (0.1 %) NASAL SPRAY    1 spray by Nasal route 2 (two) times daily.       Start Date: --        End Date: --    BENRALIZUMAB (FASENRA) 30 MG/ML SYRG INJECTION    Inject 30 mg into the skin every 8 weeks.       Start Date: --        End Date: --    DICYCLOMINE (BENTYL) 10 MG CAPSULE    TAKE ONE TABLET EVERY 6 HOURS. AS NEEDED FOR CRAMPS       Start Date: 11/13/2023End Date: --    ESCITALOPRAM OXALATE (LEXAPRO) 10 MG TABLET    Take 1 tablet (10 mg total) by mouth once daily.       Start Date: 3/26/2025 End Date: --    EZETIMIBE (ZETIA) 10 MG TABLET    Take 10 mg by mouth once daily.       Start Date: 5/10/2022 End Date: --    FAMOTIDINE (PEPCID) 20 MG TABLET    Take 2 tablets (40 mg total) by mouth every evening. 2 tablets at night       Start Date: 5/29/2025 End Date: --    FEXOFENADINE (ALLEGRA) 180 MG TABLET    Take 180 mg by mouth once daily.       Start Date: --        End Date: --    FLUTICASONE PROPIONATE (FLONASE) 50 MCG/ACTUATION NASAL SPRAY    1 spray by Each Nostril route 2 (two) times a day.       Start Date: --        End Date: --    FLUTICASONE-UMECLIDIN-VILANTER (TRELEGY ELLIPTA) 200-62.5-25 MCG INHALER    INHALE ONE PUFF BY MOUTH ONCE DAILY       Start Date: 10/8/2024 End Date: --    LOSARTAN-HYDROCHLOROTHIAZIDE 100-25 MG (HYZAAR) 100-25 MG PER TABLET    Take 1 tablet by mouth once  daily.       Start Date: 4/21/2025 End Date: --    MONTELUKAST (SINGULAIR) 10 MG TABLET    Take 1 tablet (10 mg total) by mouth every evening.       Start Date: 10/1/2024 End Date: --    MULTIVITAMIN CAPSULE    Take 1 capsule by mouth once daily.       Start Date: --        End Date: --    NON FORMULARY MEDICATION    1 application  by subcutaneous (via wearable injector) route once a week. Allergy injections weekly       Start Date: --        End Date: --    OMEPRAZOLE (PRILOSEC) 20 MG CAPSULE    Take 2 capsules (40 mg total) by mouth once daily.       Start Date: 5/13/2025 End Date: --    PRAVASTATIN (PRAVACHOL) 40 MG TABLET    Take 40 mg by mouth every evening.       Start Date: 5/10/2022 End Date: --    ROMOSOZUMAB-AQQG (EVENITY) 210MG/2.34ML ( 105MG/1.17MLX2)    Inject 210 mg into the skin every 30 days.       Start Date: --        End Date: --    SPIRONOLACTONE (ALDACTONE) 100 MG TABLET    Take 1 tablet (100 mg total) by mouth once daily.       Start Date: 6/11/2024 End Date: --    ZINC GLUCONATE 50 MG TABLET    Take 50 mg by mouth once daily.       Start Date: --        End Date: --   Discontinued Medications    METOPROLOL TARTRATE (LOPRESSOR) 25 MG TABLET    Take 25 mg by mouth 2 (two) times daily.       Start Date: 10/18/2024End Date: 6/12/2025        Follow up in about 2 months (around 8/12/2025) for Wellness. In addition to their scheduled follow up, the patient has also been instructed to follow up on as needed basis.   This note was created using [employer] voice recognition software that occasionally misinterpreted phrases or words. Please contact me if any questions may rise regarding documentation to clarify verbiage.   This note was generated with the assistance of ambient listening technology. Verbal consent was obtained by the patient and accompanying visitor(s) for the recording of patient appointment to facilitate this note. I attest to having reviewed and edited the generated note for accuracy,  though some syntax or spelling errors may persist. Please contact the author of this note for any clarification.             [1]   Patient Active Problem List  Diagnosis    Morbid (severe) obesity due to excess calories    Coronary artery disease    Hypertension    Eosinophilic asthma    Hyperlipidemia    SHARIF on CPAP    Shoulder impingement, right    Severe persistent asthma    Gastritis    Heartburn    Rhinitis    Osteoporosis    Osteoarthritis of right shoulder    Arm mass, right    Fatigue   [2]   Social History  Socioeconomic History    Marital status:    Tobacco Use    Smoking status: Never    Smokeless tobacco: Never   Substance and Sexual Activity    Alcohol use: Never    Drug use: Never    Sexual activity: Yes     Partners: Male     Birth control/protection: Post-menopausal     Comment: With spouse only     Social Drivers of Health     Financial Resource Strain: Low Risk  (2/24/2025)    Overall Financial Resource Strain (CARDIA)     Difficulty of Paying Living Expenses: Not hard at all   Food Insecurity: No Food Insecurity (2/24/2025)    Hunger Vital Sign     Worried About Running Out of Food in the Last Year: Never true     Ran Out of Food in the Last Year: Never true   Transportation Needs: No Transportation Needs (2/24/2025)    PRAPARE - Transportation     Lack of Transportation (Medical): No     Lack of Transportation (Non-Medical): No   Physical Activity: Inactive (2/24/2025)    Exercise Vital Sign     Days of Exercise per Week: 0 days     Minutes of Exercise per Session: 0 min   Stress: No Stress Concern Present (2/24/2025)    [employer] [place] [employer] - Occupational Stress Questionnaire     Feeling of Stress : Not at all   Housing Stability: Low Risk  (2/24/2025)    Housing Stability Vital Sign     Unable to Pay for Housing in the Last Year: No     Number of Times Moved in the Last Year: 0     Homeless in the Last Year: No

## 2025-06-12 NOTE — ED PROVIDER NOTES
Encounter Date: 2025       History     Chief Complaint   Patient presents with    Foot Pain     Pt c/o intermittent right foot pain onset 2025, denies known injury.     See MDM for details.      The history is provided by the patient. No  was used.     Review of patient's allergies indicates:   Allergen Reactions    Metoclopramide      Other reaction(s): anxiety    Other Reaction(s): Unknown    Amoxicillin-pot clavulanate      Other reaction(s): rash    Other Reaction(s): Unknown    Clindamycin      Other reaction(s): nausea, vomiting    Ibuprofen      Other reaction(s): rash     Past Medical History:   Diagnosis Date    Allergic rhinitis due to pollen     Anxiety disorder, unspecified     Arthritis     Depression     GERD (gastroesophageal reflux disease)     HTN (hypertension)     Hyponatremia 11/15/2024    Mild intermittent asthma, uncomplicated     Obesity, unspecified     Personal history of colonic polyps     Sleep apnea, unspecified     cpap     Past Surgical History:   Procedure Laterality Date    APPENDECTOMY  1966    BREAST LUMPECTOMY Bilateral     benign    COLONOSCOPY  2017    JOINT REPLACEMENT  2019    Knee replacement Bilateral    REVERSE TOTAL SHOULDER ARTHROPLASTY Right 2024    Procedure: ARTHROPLASTY, SHOULDER, TOTAL, REVERSE/ Biomet;  Surgeon: Jarrell Enriquez MD;  Location: [facility];  Service: Orthopedics;  Laterality: Right;     Family History   Problem Relation Name Age of Onset    Heart failure Mother Evelia Vieyra     Stroke Mother Evelia Vieyra     Arthritis Mother Evelia Vieyra             Heart disease Mother Evelia Vieyra     Hypertension Mother Evelia Vieyra     Cancer Father Harsh Vieyra     Alcohol abuse Father Harsh Vieyra     Heart failure Sister Rehana Villegas     Rectal cancer Sister Rehana Belgtam     Anemia Sister Rehana Villegas         warm autoimmune hemolytic anemia    Heart disease Sister Mabel Sapp     Hypertension Sister Mabel Sapp     Heart  failure Brother Dallas Vieyra     Cancer Brother Danny Paul hodgkins at 22    Early death Brother Dallas Vieyra         at 36 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    HENT: Negative.     Eyes: Negative.    Respiratory: Negative.     Cardiovascular: Negative.    Gastrointestinal: Negative.    Genitourinary: Negative.    Musculoskeletal:  Positive for arthralgias.   Neurological: Negative.    All other systems reviewed and are negative.      Physical Exam     Initial Vitals [06/12/25 1227]   BP Pulse Resp Temp SpO2   (!) 156/78 68 20 98.1 °F (36.7 °C) 98 %      MAP       --         Physical Exam    Nursing note and vitals reviewed.  Constitutional: She appears well-developed and well-nourished. She is not diaphoretic. No distress.   HENT:   Head: Atraumatic.   Eyes: Lids are normal.   Cardiovascular:  Normal rate.           Pulmonary/Chest: Effort normal. No respiratory distress.   Abdominal: Abdomen is flat.   Musculoskeletal:      Right foot: Normal.      Left foot: Normal.        Legs:       Comments: Point tenderness tendon sheath in between 3rd and 4th interspaces of right foot.  Neurovascular intact proximal and distal to the area.     Neurological: She is alert and oriented to person, place, and time. She has normal strength. She is not disoriented. GCS eye subscore is 4. GCS verbal subscore is 5. GCS motor subscore is 6.   Skin: Skin is warm and intact.   Psychiatric: She has a normal mood and affect. Her speech is normal and behavior is normal. Judgment and thought content normal. Cognition and memory are normal.         ED Course   Procedures  Labs Reviewed - No data to display       Imaging Results              X-Ray Foot Complete Right (Final result)  Result time 06/12/25 13:24:39      Final result by Carla Calle MD (06/12/25 13:24:39)                   Impression:      No acute bony  abnormality.      Electronically signed by: Carla Calle  Date:    06/12/2025  Time:    13:24               Narrative:    EXAMINATION:  XR FOOT COMPLETE 3 VIEW RIGHT    CLINICAL HISTORY:  Pain in right foot    COMPARISON:  None.    FINDINGS:  There is no acute fracture or malalignment.  There is a small calcaneal enthesophyte.  The soft tissues are unremarkable.                                       Medications - No data to display  Medical Decision Making  64yoWF w/hx of anxiety, arthritis, GERD, HTN, HLD, and SHARIF presents to the emergency department with 3-4 months of ongoing foot pain right in between the 3rd and 4th digits of the right foot.  No injury.  Patient was seen for this at local urgent care and told to follow up with primary care as needed.  Patient saw primary care today and was told they can do anything about it.  Patient denies any known injury or recent injury.  Denies nausea, vomiting, fever, chills, chest pain, or difficulty breathing.    Problems Addressed:  Right foot pain: chronic illness or injury with exacerbation, progression, or side effects of treatment     Details: Differential diagnosis included but not limited to:  Tendinitis, plantar fasciitis, Achilles tendinitis, fracture, metacarpal fracture, Loja's neuroma, cellulitis, other etiology     Skin is intact.  No erythema.  Neurovascular intact above and below the area that is affected.  Patient has point tenderness over the tendon sheath interspace in between the 3rd and the 4th digits of the right foot.  Likely has a Loja's neuroma.  She is also wearing pointed shoes, which she states she wears often.  She also wear heels up until 5 years ago when she had a total knee replacement.  Patient needs to see orthopedic doctor for localized injection and imaging as needed.  Patient agreeable to plan.  Referral is made. All questions asked and answered at the time of the visit. Strict ER precautions given. Patient and family members  agreeable to plan.       Amount and/or Complexity of Data Reviewed  Radiology: ordered.               ED Course as of 06/12/25 1335   Thu Jun 12, 2025   1334 X-Ray Foot Complete Right [ST]   1334 Impression:     No acute bony abnormality.        Electronically signed by:Carla Calle  Date:                                            06/12/2025  Time:                                           13:24      Exam Ended: 06/12/25 12:55 CDT Last Resulted: 06/12/25 13:24 CDT     [ST]      ED Course User Index  [ST] Tanya Slade PA                           Clinical Impression:  Final diagnoses:  [M79.671] Right foot pain  [G57.61] Loja's neuroma of right foot (Primary)          ED Disposition Condition    Discharge Stable          ED Prescriptions    None       Follow-up Information       Follow up With Specialties Details Why Contact Info    Tanya Lagunas MD Family Medicine Call   [address]  [facility] [address]  [phone]      [facility] - Emergency Dept Emergency Medicine  As needed, If symptoms worsen [address] [id number] [phone]    [facility] -  Call   [address] [address] [id number] [phone]      [facility] - Orthopa[facility] Orthopedics Call   [address] [phone]            This note was typed partially using voice recognition software.  Please be reminded that not all corrections/addendums to grammar may have been made prior to closing of this chart.         [1]   Social History  Tobacco Use    Smoking status: Never    Smokeless tobacco: Never   Substance Use Topics    Alcohol use: Never    Drug use: Never        Tanya Slade PA  06/12/25 [id number]

## 2025-06-16 ENCOUNTER — HOSPITAL ENCOUNTER (OUTPATIENT)
Dept: RADIOLOGY | Facility: HOSPITAL | Age: 65
Discharge: HOME | End: 2025-06-16
Attending: STUDENT IN AN ORGANIZED HEALTH CARE EDUCATION/TRAINING PROGRAM
Payer: MEDICARE

## 2025-06-16 DIAGNOSIS — R22.31 ARM MASS, RIGHT: ICD-10-CM

## 2025-06-16 PROCEDURE — 76882 US LMTD JT/FCL EVL NVASC XTR: CPT | Mod: TC,RT

## 2025-06-17 ENCOUNTER — RESULTS FOLLOW-UP (OUTPATIENT)
Dept: PRIMARY CARE CLINIC | Facility: CLINIC | Age: 65
End: 2025-06-17

## 2025-06-23 ENCOUNTER — OFFICE VISIT (OUTPATIENT)
Dept: ORTHOPEDICS | Facility: CLINIC | Age: 65
End: 2025-06-23
Payer: MEDICARE

## 2025-06-23 ENCOUNTER — HOSPITAL ENCOUNTER (OUTPATIENT)
Dept: RADIOLOGY | Facility: CLINIC | Age: 65
Discharge: HOME | End: 2025-06-23
Attending: REHABILITATION UNIT
Payer: MEDICARE

## 2025-06-23 VITALS
WEIGHT: 207.88 LBS | HEART RATE: 80 BPM | HEIGHT: 62 IN | SYSTOLIC BLOOD PRESSURE: 141 MMHG | DIASTOLIC BLOOD PRESSURE: 76 MMHG | BODY MASS INDEX: 38.25 KG/M2

## 2025-06-23 DIAGNOSIS — Z96.611 STATUS POST REVERSE TOTAL REPLACEMENT OF RIGHT SHOULDER: Primary | ICD-10-CM

## 2025-06-23 DIAGNOSIS — Z96.611 STATUS POST REVERSE TOTAL REPLACEMENT OF RIGHT SHOULDER: ICD-10-CM

## 2025-06-23 PROCEDURE — 99213 OFFICE O/P EST LOW 20 MIN: CPT | Mod: ,,, | Performed by: REHABILITATION UNIT

## 2025-06-23 PROCEDURE — 73030 X-RAY EXAM OF SHOULDER: CPT | Mod: RT,,, | Performed by: REHABILITATION UNIT

## 2025-06-23 NOTE — PROGRESS NOTES
Subjective:      Patient ID: Ivette Santillan is a 64 y.o. female.    Chief Complaint: Pain (7 months RT reverse TSA sx 11/14/24- Stated Wednesday felt a pop and pain right after. Popping sensation has occur 3 more times. Has minimal from shoulder into the neck at times. Pain is mostly near the front of the shoulder and into clavicle. Denies any numbness or tingling. Is taking aleve PRN which helps with some of the pain. )    Date of procedure: 11/14/2024     Procedure performed:  Right reverse total shoulder arthroplasty and biceps tenodesis     Patient returns to the clinic today for post-operative examination. Patient is status post the above-stated procedure.    History of Present Illness    CHIEF COMPLAINT:  - Right shoulder pain    HPI:  Ivette presents with shoulder pain that began last Wednesday evening. While sitting and reaching back, her shoulder made a loud popping sound, causing a stabbing pain in the front of the shoulder. She has experienced three similar episodes since the initial incident. Pain is localized to the front of the shoulder. The area is more sore than usual and was slightly swollen in the upper shoulder and surrounding area. She denies any feeling of slipping or dislocation.    Prior to this incident, she was doing well, although she reports pain in her shoulder blade during rainy weather or when sitting incorrectly, which she attributes to her joint replacement. She maintains full range of motion despite the pain. She has been using a massage gun on low setting a few times daily to keep the area loose.    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 She typically rests on Sunday evening and Monday to allow for recovery. She is planning a trip to [facility] on Saturday and intends to swim and snorkel, but will avoid strenuous activities like getting on and off boats due to her condition.    She denies any history  of formal medical diagnoses.    PREVIOUS TREATMENTS:  - Massage gun: Used on low setting a few times daily to keep the bicep muscle loose    IMAGING:  - XR Shoulder: Everything is well fixed, no signs of loosening or failure, screws are well in place. No component or surrounding bone breakage observed.    SURGICAL HISTORY:  - Shoulder joint replacement    WORK STATUS:  - Plays organ at [employer] for about an hour and a half on Sundays  - Maintains a specific arm position during organ playing  - Rests on Sunday evening and Monday to recover from muscle fatigue    SOCIAL HISTORY:  - Marital Status:   - Plays organ in [employer] on Sundays for about an hour and a half      ROS:  Musculoskeletal: +joint pain, +joint swelling, +limb pain, +muscle weakness  Integumentary: +skin lesion         Objective:     Vitals:    06/23/25 1501   BP: (!) 141/76   Pulse: 80       General: well-developed well-nourished in no acute distress    Physical Exam:  Right shoulder  Incision well healed with no erythema, fluctuance, induration, drainage or external signs of infection   No gross swelling or epitrochlear lymphadenopathy appreciated  , ER 60, IR pocket  Full range of motion of the elbow and distally.  Sensation grossly intact to all dermatomal distributions  No neurological deficits appreciated   Palpable radial pulse    Assessment:       Encounter Diagnosis   Name Primary?    Status post reverse total replacement of right shoulder Yes           Plan:       Ivette was seen today for pain.    Diagnoses and all orders for this visit:    Status post reverse total replacement of right shoulder  -     X-ray Shoulder 2 or More Views Right; Future      s/p above stated procedure      On exam today she has good motion and strength and radiographs show no acute abnormality.  She may continue home exercises for the shoulder.  Avoid aggravating activities and symptomatic management.  Advised her to rest for the week and then progress  activities as able.  Ice encouraged.  Over-the-counter medicines as needed.  She will follow up with me at 1 year postop.    Follow-up: Ivette Santillan to follow up as above. If there are any questions prior to this, the patient was instructed to contact the office.

## 2025-06-24 ENCOUNTER — DOCUMENTATION ONLY (OUTPATIENT)
Dept: CASE MANAGEMENT | Facility: HOSPITAL | Age: 65
End: 2025-06-24
Payer: MEDICARE

## 2025-06-24 ENCOUNTER — TELEPHONE (OUTPATIENT)
Dept: PRIMARY CARE CLINIC | Facility: CLINIC | Age: 65
End: 2025-06-24
Payer: MEDICARE

## 2025-06-24 DIAGNOSIS — I10 PRIMARY HYPERTENSION: ICD-10-CM

## 2025-06-24 RX ORDER — SPIRONOLACTONE 100 MG/1
100 TABLET, FILM COATED ORAL DAILY
Qty: 90 TABLET | Refills: 3 | Status: SHIPPED | OUTPATIENT
Start: 2025-06-24

## 2025-06-24 NOTE — TELEPHONE ENCOUNTER
Copied from CRM #[id number]. Topic: Medications - Medication Refill  >> Jun 24, 2025 11:28 AM Nesha wrote:  Who Called: Ivette Santillan    Refill or New Rx:Refill  RX Name and Strength:  spironolactone (ALDACTONE) 100 MG tablet    How is the patient currently taking it? (ex. 1XDay):1xday  Is this a 30 day or 90 day RX:90  Local or Mail Order:  List of preferred pharmacies on file (remove unneeded): [unfilled][id number] at [place] [address]   If different Pharmacy is requested, enter Pharmacy information here including location and phone number:   Ordering Provider:      Preferred Method of Contact: Phone Call  Patient's Preferred Phone Number on File: [phone]   Best Call Back Number, if different:  Additional Information:

## 2025-07-28 ENCOUNTER — PATIENT OUTREACH (OUTPATIENT)
Facility: CLINIC | Age: 65
End: 2025-07-28
Payer: MEDICARE

## 2025-07-28 DIAGNOSIS — Z86.0100 HISTORY OF COLONIC POLYPS: Primary | ICD-10-CM

## 2025-07-28 NOTE — PROGRESS NOTES
Population Health Outreach.    Health Maintenance Topic(s) Outreach Outcomes & Actions Taken:    Colorectal Cancer Screening - Outreach Outcomes & Actions Taken  : External Records Uploaded, Care Team Updated, & History Updated if Applicable  07.19.2022 Colon [facility] Dr Correa +polyps hyperlinked   Surgical Hx and problem list  updated      Additional Notes:  Updated Outside Immunizations     Next Primary Care Visit Date: 8/13/2025

## 2025-07-29 ENCOUNTER — INFUSION (OUTPATIENT)
Dept: INFUSION THERAPY | Facility: HOSPITAL | Age: 65
End: 2025-07-29
Attending: INTERNAL MEDICINE
Payer: MEDICARE

## 2025-07-29 VITALS
DIASTOLIC BLOOD PRESSURE: 78 MMHG | TEMPERATURE: 98 F | HEART RATE: 79 BPM | OXYGEN SATURATION: 96 % | RESPIRATION RATE: 18 BRPM | SYSTOLIC BLOOD PRESSURE: 137 MMHG

## 2025-07-29 DIAGNOSIS — J45.50 SEVERE PERSISTENT ASTHMA, UNSPECIFIED WHETHER COMPLICATED: Primary | ICD-10-CM

## 2025-07-29 PROCEDURE — 96372 THER/PROPH/DIAG INJ SC/IM: CPT

## 2025-07-29 PROCEDURE — 63600175 PHARM REV CODE 636 W HCPCS: Mod: JZ,TB | Performed by: INTERNAL MEDICINE

## 2025-07-29 RX ADMIN — BENRALIZUMAB 30 MG: 30 INJECTION, SOLUTION SUBCUTANEOUS at 08:07

## 2025-07-29 NOTE — NURSING
Fasenra given, tolerated well. Discharged in stable condition and is aware of future appointments.

## 2025-08-06 ENCOUNTER — LAB VISIT (OUTPATIENT)
Dept: LAB | Facility: HOSPITAL | Age: 65
End: 2025-08-06
Attending: STUDENT IN AN ORGANIZED HEALTH CARE EDUCATION/TRAINING PROGRAM
Payer: MEDICARE

## 2025-08-06 DIAGNOSIS — E66.01 MORBID (SEVERE) OBESITY DUE TO EXCESS CALORIES: ICD-10-CM

## 2025-08-06 DIAGNOSIS — R53.83 FATIGUE, UNSPECIFIED TYPE: ICD-10-CM

## 2025-08-06 DIAGNOSIS — I25.10 CORONARY ARTERY DISEASE, UNSPECIFIED VESSEL OR LESION TYPE, UNSPECIFIED WHETHER ANGINA PRESENT, UNSPECIFIED WHETHER NATIVE OR TRANSPLANTED HEART: ICD-10-CM

## 2025-08-06 DIAGNOSIS — R79.9 ABNORMAL FINDING OF BLOOD CHEMISTRY, UNSPECIFIED: ICD-10-CM

## 2025-08-06 LAB
25(OH)D3+25(OH)D2 SERPL-MCNC: 48 NG/ML (ref 30–80)
ALBUMIN SERPL-MCNC: 3.8 G/DL (ref 3.4–4.8)
ALBUMIN/GLOB SERPL: 1.4 RATIO (ref 1.1–2)
ALP SERPL-CCNC: 112 UNIT/L (ref 40–150)
ALT SERPL-CCNC: 22 UNIT/L (ref 0–55)
ANION GAP SERPL CALC-SCNC: 9 MEQ/L
AST SERPL-CCNC: 19 UNIT/L (ref 11–45)
BASOPHILS # BLD AUTO: 0.03 X10(3)/MCL
BASOPHILS NFR BLD AUTO: 0.4 %
BILIRUB SERPL-MCNC: 0.5 MG/DL
BUN SERPL-MCNC: 17.9 MG/DL (ref 9.8–20.1)
CALCIUM SERPL-MCNC: 9.5 MG/DL (ref 8.4–10.2)
CHLORIDE SERPL-SCNC: 107 MMOL/L (ref 98–107)
CHOLEST SERPL-MCNC: 155 MG/DL
CHOLEST/HDLC SERPL: 3 {RATIO} (ref 0–5)
CO2 SERPL-SCNC: 22 MMOL/L (ref 23–31)
CREAT SERPL-MCNC: 0.76 MG/DL (ref 0.55–1.02)
CREAT/UREA NIT SERPL: 24
EOSINOPHIL # BLD AUTO: 0 X10(3)/MCL (ref 0–0.9)
EOSINOPHIL NFR BLD AUTO: 0 %
ERYTHROCYTE [DISTWIDTH] IN BLOOD BY AUTOMATED COUNT: 13.2 % (ref 11.5–17)
EST. AVERAGE GLUCOSE BLD GHB EST-MCNC: 116.9 MG/DL
GFR SERPLBLD CREATININE-BSD FMLA CKD-EPI: >60 ML/MIN/1.73/M2
GLOBULIN SER-MCNC: 2.7 GM/DL (ref 2.4–3.5)
GLUCOSE SERPL-MCNC: 91 MG/DL (ref 82–115)
HBA1C MFR BLD: 5.7 %
HCT VFR BLD AUTO: 38.9 % (ref 37–47)
HDLC SERPL-MCNC: 55 MG/DL (ref 35–60)
HGB BLD-MCNC: 13 G/DL (ref 12–16)
IMM GRANULOCYTES # BLD AUTO: 0.03 X10(3)/MCL (ref 0–0.04)
IMM GRANULOCYTES NFR BLD AUTO: 0.4 %
LDLC SERPL CALC-MCNC: 86 MG/DL (ref 50–140)
LYMPHOCYTES # BLD AUTO: 2.11 X10(3)/MCL (ref 0.6–4.6)
LYMPHOCYTES NFR BLD AUTO: 26.2 %
MCH RBC QN AUTO: 30.2 PG (ref 27–31)
MCHC RBC AUTO-ENTMCNC: 33.4 G/DL (ref 33–36)
MCV RBC AUTO: 90.3 FL (ref 80–94)
MONOCYTES # BLD AUTO: 0.63 X10(3)/MCL (ref 0.1–1.3)
MONOCYTES NFR BLD AUTO: 7.8 %
NEUTROPHILS # BLD AUTO: 5.26 X10(3)/MCL (ref 2.1–9.2)
NEUTROPHILS NFR BLD AUTO: 65.2 %
NRBC BLD AUTO-RTO: 0 %
PLATELET # BLD AUTO: 285 X10(3)/MCL (ref 130–400)
PMV BLD AUTO: 10.1 FL (ref 7.4–10.4)
POTASSIUM SERPL-SCNC: 4.5 MMOL/L (ref 3.5–5.1)
PROT SERPL-MCNC: 6.5 GM/DL (ref 5.8–7.6)
RBC # BLD AUTO: 4.31 X10(6)/MCL (ref 4.2–5.4)
SODIUM SERPL-SCNC: 138 MMOL/L (ref 136–145)
T4 FREE SERPL-MCNC: 1.04 NG/DL (ref 0.7–1.48)
TRIGL SERPL-MCNC: 71 MG/DL (ref 37–140)
TSH SERPL-ACNC: 2.08 UIU/ML (ref 0.35–4.94)
VLDLC SERPL CALC-MCNC: 14 MG/DL
WBC # BLD AUTO: 8.06 X10(3)/MCL (ref 4.5–11.5)

## 2025-08-06 PROCEDURE — 82306 VITAMIN D 25 HYDROXY: CPT

## 2025-08-06 PROCEDURE — 84439 ASSAY OF FREE THYROXINE: CPT

## 2025-08-06 PROCEDURE — 85025 COMPLETE CBC W/AUTO DIFF WBC: CPT

## 2025-08-06 PROCEDURE — 80053 COMPREHEN METABOLIC PANEL: CPT

## 2025-08-06 PROCEDURE — 83036 HEMOGLOBIN GLYCOSYLATED A1C: CPT

## 2025-08-06 PROCEDURE — 80061 LIPID PANEL: CPT

## 2025-08-06 PROCEDURE — 84443 ASSAY THYROID STIM HORMONE: CPT

## 2025-08-06 PROCEDURE — 36415 COLL VENOUS BLD VENIPUNCTURE: CPT

## 2025-08-11 ENCOUNTER — TELEPHONE (OUTPATIENT)
Dept: PRIMARY CARE CLINIC | Facility: CLINIC | Age: 65
End: 2025-08-11
Payer: MEDICARE

## 2025-08-27 ENCOUNTER — HOSPITAL ENCOUNTER (OUTPATIENT)
Dept: RADIOLOGY | Facility: HOSPITAL | Age: 65
Discharge: HOME | End: 2025-08-27
Attending: NURSE PRACTITIONER
Payer: MEDICARE

## 2025-08-27 DIAGNOSIS — M81.0 AGE-RELATED OSTEOPOROSIS WITHOUT CURRENT PATHOLOGICAL FRACTURE: ICD-10-CM

## 2025-08-27 PROCEDURE — 77080 DXA BONE DENSITY AXIAL: CPT | Mod: TC

## (undated) DEVICE — DRAPE STERI U-SHAPED 47X51IN

## (undated) DEVICE — PAD ABDOMINAL STERILE 5X9IN

## (undated) DEVICE — ELECTRODE PATIENT RETURN DISP

## (undated) DEVICE — APPLICATOR CHLORAPREP ORN 26ML

## (undated) DEVICE — COVER TABLE HVY DTY 60X90IN

## (undated) DEVICE — HOOD FLYTE SURGICOOL

## (undated) DEVICE — NDL SAFETY 21G X 1 1/2 ECLPSE

## (undated) DEVICE — GLOVE SENSICARE PI SURG 6.5

## (undated) DEVICE — SOL NACL IRR 3000ML

## (undated) DEVICE — GLOVE SENSICARE PI GRN 7

## (undated) DEVICE — COVER MAYO STAND REINFRCD 30

## (undated) DEVICE — SUT STRATAFIX SPRL UD 3-0 27IN

## (undated) DEVICE — SPONGE LAP 18X18 PREWASHED

## (undated) DEVICE — DRAPE U STD FILM LG 60X84IN

## (undated) DEVICE — SUT 38 FIBERWIRE #2

## (undated) DEVICE — GLOVE SENSICARE PI ORTHO LT 7

## (undated) DEVICE — SOL NACL IRR 1000ML BTL

## (undated) DEVICE — DRAPE INCISE IOBAN 2 13X13IN

## (undated) DEVICE — BIT DRILL REVERSE SHLDR 2.7MM

## (undated) DEVICE — COVER FULLGUARD SHOE HIGH-TOP

## (undated) DEVICE — KIT IRR SUCTION HND PIECE

## (undated) DEVICE — DRAPE SHOULDER BEACH CHAIR

## (undated) DEVICE — SYS IRRISEPT 450ML0.05% CHG

## (undated) DEVICE — SUT VICRYL 0 27 CT-2

## (undated) DEVICE — DRAPE INCISE IOBAN 2 23X23IN

## (undated) DEVICE — RESTRAINT HEAD BCH CHR W/ CLIP

## (undated) DEVICE — ELECTRODE NEEDLE 2.8IN

## (undated) DEVICE — TAPE ADH MEDIPORE 4 X 10YDS

## (undated) DEVICE — STAPLER SKIN PROXIMATE WIDE

## (undated) DEVICE — BLADE SAG DUAL CUT 18X90X1.35

## (undated) DEVICE — KIT TRIMANO

## (undated) DEVICE — SUPPORT SLING SHOT II MEDIUM

## (undated) DEVICE — DRESSING TRANS 4X4 3/4

## (undated) DEVICE — SPONGE COTTON TRAY 4X4IN

## (undated) DEVICE — BIT DRILL REV SHOULDER 3.2MM

## (undated) DEVICE — SUT MONOCRYL 2-0 S UND

## (undated) DEVICE — GOWN POLY REINF X-LONG 2XL

## (undated) DEVICE — SYR 30CC LUER LOCK

## (undated) DEVICE — DRAPE FULL SHEET 70X100IN

## (undated) DEVICE — Device

## (undated) DEVICE — KIT SURGICAL TURNOVER

## (undated) DEVICE — IMPLANTABLE DEVICE
Type: IMPLANTABLE DEVICE | Site: SHOULDER | Status: NON-FUNCTIONAL
Brand: COMPREHENSIVE® REVERSE SHOULDER
Removed: 2024-11-14

## (undated) DEVICE — GLOVE SENSICARE PI GRN 6.5